# Patient Record
Sex: MALE | URBAN - NONMETROPOLITAN AREA
[De-identification: names, ages, dates, MRNs, and addresses within clinical notes are randomized per-mention and may not be internally consistent; named-entity substitution may affect disease eponyms.]

---

## 2017-05-19 ENCOUNTER — APPOINTMENT (RX ONLY)
Dept: URBAN - NONMETROPOLITAN AREA CLINIC 1 | Facility: CLINIC | Age: 72
Setting detail: DERMATOLOGY
End: 2017-05-19

## 2017-05-19 DIAGNOSIS — D485 NEOPLASM OF UNCERTAIN BEHAVIOR OF SKIN: ICD-10-CM

## 2017-05-19 DIAGNOSIS — L29.8 OTHER PRURITUS: ICD-10-CM

## 2017-05-19 DIAGNOSIS — L29.89 OTHER PRURITUS: ICD-10-CM

## 2017-05-19 PROBLEM — D48.5 NEOPLASM OF UNCERTAIN BEHAVIOR OF SKIN: Status: ACTIVE | Noted: 2017-05-19

## 2017-05-19 PROBLEM — J45.909 UNSPECIFIED ASTHMA, UNCOMPLICATED: Status: ACTIVE | Noted: 2017-05-19

## 2017-05-19 PROCEDURE — ? COUNSELING

## 2017-05-19 PROCEDURE — 11100: CPT

## 2017-05-19 PROCEDURE — 99202 OFFICE O/P NEW SF 15 MIN: CPT | Mod: 25

## 2017-05-19 PROCEDURE — ? BIOPSY BY SHAVE METHOD

## 2017-05-19 PROCEDURE — ? TREATMENT REGIMEN

## 2017-05-19 PROCEDURE — ? BIOPSY BY PUNCH METHOD

## 2017-05-19 PROCEDURE — 11101: CPT

## 2017-05-19 ASSESSMENT — LOCATION SIMPLE DESCRIPTION DERM
LOCATION SIMPLE: LEFT CHEEK
LOCATION SIMPLE: UPPER BACK
LOCATION SIMPLE: NOSE

## 2017-05-19 ASSESSMENT — LOCATION ZONE DERM
LOCATION ZONE: TRUNK
LOCATION ZONE: NOSE
LOCATION ZONE: FACE

## 2017-05-19 ASSESSMENT — LOCATION DETAILED DESCRIPTION DERM
LOCATION DETAILED: SUPERIOR THORACIC SPINE
LOCATION DETAILED: LEFT CENTRAL MALAR CHEEK
LOCATION DETAILED: NASAL SUPRATIP

## 2017-05-19 ASSESSMENT — ITCH INTENSITY: HOW SEVERE IS YOUR ITCHING?: 6

## 2017-05-19 NOTE — PROCEDURE: BIOPSY BY PUNCH METHOD
Post-Care Instructions: I reviewed with the patient in detail post-care instructions. Patient is to keep the biopsy site dry overnight, and then apply Polysporin twice daily until healed. Patient may apply hydrogen peroxide soaks to remove any crusting.
Consent: Written consent was obtained and risks were reviewed including but not limited to scarring, infection, bleeding, scabbing, incomplete removal, nerve damage and allergy to anesthesia.
Billing Type: Third-Party Bill
Home Suture Removal Text: Patient was provided a home suture removal kit and will remove their sutures at home.  If they have any questions or difficulties they will call the office.
Patient Will Remove Sutures At Home?: No
Size Of Lesion In Cm (Optional): 1.2
Anesthesia Type: 1% lidocaine with epinephrine and a 1:10 solution of 8.4% sodium bicarbonate
X Depth Of Punch In Cm (Optional): 0
Detail Level: Detailed
Dressing: bandage
Epidermal Sutures: none, closed by secondary intention
X Size Of Lesion In Cm (Optional): 1
Render Post-Care Instructions In Note?: yes
Notification Instructions: Patient will be notified of biopsy results. However, patient instructed to call the office if not contacted within 2 weeks.
Punch Size In Mm: 3
Lab Facility: [id number]
Biopsy Type: H and E
Body Location Override (Optional - Billing Will Still Be Based On Selected Body Map Location If Applicable): Rt nasal ala
Lab: [id number]
Wound Care: Polysporin ointment
Hemostasis: Electrodesiccation

## 2017-05-19 NOTE — PROCEDURE: BIOPSY BY SHAVE METHOD
Bill For Surgical Tray: no
Lab Facility: [id number]
Biopsy Type: H and E
Hemostasis: Electrodesiccation
Silver Nitrate Text: The wound bed was treated with silver nitrate after the biopsy was performed.
Render Post-Care Instructions In Note?: yes
Notification Instructions: Patient will be notified of biopsy results. However, patient instructed to call the office if not contacted within 2 weeks.
Curettage Text: The wound bed was treated with curettage after the biopsy was done.
Additional Anesthesia Volume In Cc (Will Not Render If 0): 0
Electrodesiccation Text: The wound bed was treated with electrodesiccation after the biopsy was performed.
Type Of Destruction Used: Curettage
Size Of Lesion In Cm: 1.4
Anesthesia Volume In Cc: 1
Consent: Written consent was obtained and risks were reviewed including but not limited to scarring, infection, bleeding, scabbing, incomplete removal, nerve damage and allergy to anesthesia.
Dressing: bandage
Biopsy Method: Kelton bonilla
Anesthesia Type: 1% lidocaine with epinephrine and a 1:10 solution of 8.4% sodium bicarbonate
Detail Level: Detailed
Cryotherapy Text: The wound bed was treated with cryotherapy after the biopsy was performed.
Billing Type: Third-Party Bill
Post-Care Instructions: I reviewed with the patient in detail post-care instructions. Patient is to keep the biopsy site dry overnight, and then apply Polysporin twice daily until healed. Patient may apply hydrogen peroxide soaks to remove any crusting.
Body Location Override (Optional - Billing Will Still Be Based On Selected Body Map Location If Applicable): L cheek
Electrodesiccation And Curettage Text: The wound bed was treated with electrodesiccation and curettage after the biopsy was performed.
Lab: [id number]
Wound Care: Polysporin ointment

## 2017-06-13 ENCOUNTER — RX ONLY (OUTPATIENT)
Age: 72
Setting detail: RX ONLY
End: 2017-06-13

## 2017-06-13 PROBLEM — C44.311 BASAL CELL CARCINOMA OF SKIN OF NOSE: Status: ACTIVE | Noted: 2017-06-13

## 2017-09-21 ENCOUNTER — APPOINTMENT (RX ONLY)
Dept: URBAN - NONMETROPOLITAN AREA CLINIC 1 | Facility: CLINIC | Age: 72
Setting detail: DERMATOLOGY
End: 2017-09-21

## 2017-09-21 DIAGNOSIS — L57.0 ACTINIC KERATOSIS: ICD-10-CM

## 2017-09-21 PROBLEM — Z85.828 PERSONAL HISTORY OF OTHER MALIGNANT NEOPLASM OF SKIN: Status: ACTIVE | Noted: 2017-09-21

## 2017-09-21 PROBLEM — C44.329 SQUAMOUS CELL CARCINOMA OF SKIN OF OTHER PARTS OF FACE: Status: ACTIVE | Noted: 2017-09-21

## 2017-09-21 PROCEDURE — ? COUNSELING

## 2017-09-21 PROCEDURE — ? TREATMENT REGIMEN

## 2017-09-21 PROCEDURE — ? OBSERVATION

## 2017-09-21 PROCEDURE — 99213 OFFICE O/P EST LOW 20 MIN: CPT

## 2017-09-21 ASSESSMENT — LOCATION DETAILED DESCRIPTION DERM: LOCATION DETAILED: RIGHT MEDIAL FOREHEAD

## 2017-09-21 ASSESSMENT — LOCATION ZONE DERM: LOCATION ZONE: FACE

## 2017-09-21 ASSESSMENT — LOCATION SIMPLE DESCRIPTION DERM: LOCATION SIMPLE: RIGHT FOREHEAD

## 2017-09-21 NOTE — PROCEDURE: TREATMENT REGIMEN
Detail Level: Simple
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
Notification: Please note that there are new Treatment Regimen plans which have an enhanced patient education handout experience.  The plans are called Treatment Regimen (Acne), Treatment Regimen (Atopic Dermatitis), Treatment Regimen (Rosacea), Treatment Regimen (Sun Protection), Treatment Regimen (Cosmetic Products), and Treatment Regimen (Xerosis).
Plan: Patient referred to [facility]  for Mohs surgery

## 2017-10-05 ENCOUNTER — APPOINTMENT (RX ONLY)
Dept: URBAN - NONMETROPOLITAN AREA CLINIC 15 | Facility: CLINIC | Age: 72
Setting detail: DERMATOLOGY
End: 2017-10-05

## 2017-10-05 PROBLEM — C44.92 SQUAMOUS CELL CARCINOMA OF SKIN, UNSPECIFIED: Status: ACTIVE | Noted: 2017-10-05

## 2017-10-05 PROBLEM — J45.909 UNSPECIFIED ASTHMA, UNCOMPLICATED: Status: ACTIVE | Noted: 2017-10-05

## 2017-10-05 PROBLEM — Z85.828 PERSONAL HISTORY OF OTHER MALIGNANT NEOPLASM OF SKIN: Status: ACTIVE | Noted: 2017-10-05

## 2017-10-05 PROCEDURE — ? MOHS SURGERY PHONE CONSULTATION

## 2017-10-05 NOTE — PROCEDURE: MOHS SURGERY PHONE CONSULTATION
Does The Patient Take Blood Thinners?: Yes
Office Location Of Mohs Surgery: [address]
Date Of Mohs Surgery: 10/30/2017
If Yes- What Blood Thinners Do They Take?: Ibuprophen
Referring Provider: Aj
Patient Preferred Phone Number: [phone]
Does The Patient Have A Living Will (Optional)?: No
Time Of Mohs Surgery: 11:15
Patient's Insurance: /[employer]
Detail Level: Simple
Referring Provider Office Number: [phone]

## 2017-10-30 ENCOUNTER — APPOINTMENT (RX ONLY)
Dept: URBAN - METROPOLITAN AREA CLINIC 36 | Facility: CLINIC | Age: 72
Setting detail: DERMATOLOGY
End: 2017-10-30

## 2017-10-30 VITALS — SYSTOLIC BLOOD PRESSURE: 133 MMHG | HEART RATE: 80 BPM | DIASTOLIC BLOOD PRESSURE: 81 MMHG

## 2017-10-30 DIAGNOSIS — L57.8 OTHER SKIN CHANGES DUE TO CHRONIC EXPOSURE TO NONIONIZING RADIATION: ICD-10-CM

## 2017-10-30 PROBLEM — C44.329 SQUAMOUS CELL CARCINOMA OF SKIN OF OTHER PARTS OF FACE: Status: ACTIVE | Noted: 2017-10-30

## 2017-10-30 PROCEDURE — ? COUNSELING

## 2017-10-30 PROCEDURE — 17311 MOHS 1 STAGE H/N/HF/G: CPT

## 2017-10-30 PROCEDURE — 13132 CMPLX RPR F/C/C/M/N/AX/G/H/F: CPT

## 2017-10-30 PROCEDURE — 17312 MOHS ADDL STAGE: CPT

## 2017-10-30 PROCEDURE — 99212 OFFICE O/P EST SF 10 MIN: CPT | Mod: 25

## 2017-10-30 PROCEDURE — ? MOHS SURGERY

## 2017-10-30 NOTE — PROCEDURE: MOHS SURGERY
Advancement Flap (Double) Text: The defect edges were debeveled with a #15 scalpel blade.  Given the location of the defect and the proximity to free margins a double advancement flap was deemed most appropriate.  Using a sterile surgical marker, the appropriate advancement flaps were drawn incorporating the defect and placing the expected incisions within the relaxed skin tension lines where possible.    The area thus outlined was incised deep to adipose tissue with a #15 scalpel blade.  The skin margins were undermined to an appropriate distance in all directions utilizing iris scissors.
Stage 4: Additional Anesthesia Volume In Cc: 0
Tarsorrhaphy Performed?: No
Graft Donor Site Epidermal Sutures (Optional): 5-0 Surgipro
Stage 5: Additional Anesthesia Type: 1% lidocaine with epinephrine
Show Quadrant Variables In The Stage Tabs: Yes
Star Wedge Flap Text: The defect edges were debeveled with a #15 scalpel blade.  Given the location of the defect, shape of the defect and the proximity to free margins a star wedge flap was deemed most appropriate.  Using a sterile surgical marker, an appropriate rotation flap was drawn incorporating the defect and placing the expected incisions within the relaxed skin tension lines where possible. The area thus outlined was incised deep to adipose tissue with a #15 scalpel blade.  The skin margins were undermined to an appropriate distance in all directions utilizing iris scissors.
No Repair - Repaired With Adjacent Surgical Defect Text (Leave Blank If You Do Not Want): After obtaining clear surgical margins the defect was repaired concurrently with another surgical defect which was in close approximation.
O-L Flap Text: The defect edges were debeveled with a #15 scalpel blade.  Given the location of the defect, shape of the defect and the proximity to free margins an O-L flap was deemed most appropriate.  Using a sterile surgical marker, an appropriate advancement flap was drawn incorporating the defect and placing the expected incisions within the relaxed skin tension lines where possible.    The area thus outlined was incised deep to adipose tissue with a #15 scalpel blade.  The skin margins were undermined to an appropriate distance in all directions utilizing iris scissors.
Anesthesia Volume In Cc: 6
Surgeon: Berna Salazar MD
Localized Dermabrasion With Wire Brush Text: The patient was draped in routine manner.  Localized dermabrasion using 3 x 17 mm wire brush was performed in routine manner to papillary dermis. This spot dermabrasion is being performed to complete skin cancer reconstruction. It also will eliminate the other sun damaged precancerous cells that are known to be part of the regional effect of a lifetime's worth of sun exposure. This localized dermabrasion is therapeutic and should not be considered cosmetic in any regard.
Consent (Nose)/Introductory Paragraph: The rationale for Mohs was explained to the patient and consent was obtained. The risks, benefits and alternatives to therapy were discussed in detail. Specifically, the risks of nasal deformity, changes in the flow of air through the nose, infection, scarring, bleeding, prolonged wound healing, incomplete removal, allergy to anesthesia, nerve injury and recurrence were addressed. Prior to the procedure, the treatment site was clearly identified and confirmed by the patient. All components of Universal Protocol/PAUSE Rule completed.
Bilateral Helical Rim Advancement Flap Text: The defect edges were debeveled with a #15 blade scalpel.  Given the location of the defect and the proximity to free margins (helical rim) a bilateral helical rim advancement flap was deemed most appropriate.  Using a sterile surgical marker, the appropriate advancement flaps were drawn incorporating the defect and placing the expected incisions between the helical rim and antihelix where possible.  The area thus outlined was incised through and through with a #15 scalpel blade.  With a skin hook and iris scissors, the flaps were gently and sharply undermined and freed up.
Closure 4 Information: This tab is for additional flaps and grafts above and beyond our usual structured repairs.  Please note if you enter information here it will not currently bill and you will need to add the billing information manually.
Mauc Instructions: By selecting yes to the question below the MAUC number will be added into the note.  This will be calculated automatically based on the diagnosis chosen, the size entered, the body zone selected (H,M,L) and the specific indications you chose. You will also have the option to override the Mohs AUC if you disagree with the automatically calculated number and this option is found in the Case Summary tab.
Repair Type: Complex Repair
Epidermal Autograft Text: The defect edges were debeveled with a #15 scalpel blade.  Given the location of the defect, shape of the defect and the proximity to free margins an epidermal autograft was deemed most appropriate.  Using a sterile surgical marker, the primary defect shape was transferred to the donor site. The epidermal graft was then harvested.  The skin graft was then placed in the primary defect and oriented appropriately.
H Plasty Text: Given the location of the defect, shape of the defect and the proximity to free margins a H-plasty was deemed most appropriate for repair.  Using a sterile surgical marker, the appropriate advancement arms of the H-plasty were drawn incorporating the defect and placing the expected incisions within the relaxed skin tension lines where possible. The area thus outlined was incised deep to adipose tissue with a #15 scalpel blade. The skin margins were undermined to an appropriate distance in all directions utilizing iris scissors.  The opposing advancement arms were then advanced into place in opposite direction and anchored with interrupted buried subcutaneous sutures.
Mohs Histo Method Verbiage: Each section was then chromacoded and processed in the Mohs lab using the Mohs protocol and submitted for frozen section.
O-T Plasty Text: The defect edges were debeveled with a #15 scalpel blade.  Given the location of the defect, shape of the defect and the proximity to free margins an O-T plasty was deemed most appropriate.  Using a sterile surgical marker, an appropriate O-T plasty was drawn incorporating the defect and placing the expected incisions within the relaxed skin tension lines where possible.    The area thus outlined was incised deep to adipose tissue with a #15 scalpel blade.  The skin margins were undermined to an appropriate distance in all directions utilizing iris scissors.
Z Plasty Text: The lesion was extirpated to the level of the fat with a #15 scalpel blade.  Given the location of the defect, shape of the defect and the proximity to free margins a Z-plasty was deemed most appropriate for repair.  Using a sterile surgical marker, the appropriate transposition arms of the Z-plasty were drawn incorporating the defect and placing the expected incisions within the relaxed skin tension lines where possible.    The area thus outlined was incised deep to adipose tissue with a #15 scalpel blade.  The skin margins were undermined to an appropriate distance in all directions utilizing iris scissors.  The opposing transposition arms were then transposed into place in opposite direction and anchored with interrupted buried subcutaneous sutures.
Area L Indication Text: Tumors in this location are included in Area L (trunk and extremities).  Mohs surgery is indicated for larger tumors, or tumors with aggressive histologic features, in these anatomic locations.
Number Of Stages: 2
Island Pedicle Flap With Canthal Suspension Text: The defect edges were debeveled with a #15 scalpel blade.  Given the location of the defect, shape of the defect and the proximity to free margins an island pedicle advancement flap was deemed most appropriate.  Using a sterile surgical marker, an appropriate advancement flap was drawn incorporating the defect, outlining the appropriate donor tissue and placing the expected incisions within the relaxed skin tension lines where possible. The area thus outlined was incised deep to adipose tissue with a #15 scalpel blade.  The skin margins were undermined to an appropriate distance in all directions around the primary defect and laterally outward around the island pedicle utilizing iris scissors.  There was minimal undermining beneath the pedicle flap. A suspension suture was placed in the canthal tendon to prevent tension and prevent ectropion.
Intermediate Repair Preamble Text (Leave Blank If You Do Not Want): Undermining was performed with blunt dissection.
Same Histology In Subsequent Stages Text: The pattern and morphology of the tumor is as described in the first stage.
Consent (Temporal Branch)/Introductory Paragraph: The rationale for Mohs was explained to the patient and consent was obtained. The risks, benefits and alternatives to therapy were discussed in detail. Specifically, the risks of damage to the temporal branch of the facial nerve, infection, scarring, bleeding, prolonged wound healing, incomplete removal, allergy to anesthesia, and recurrence were addressed. Prior to the procedure, the treatment site was clearly identified and confirmed by the patient. All components of Universal Protocol/PAUSE Rule completed.
Complex Repair And Flap Additional Text (Will Appearing After The Standard Complex Repair Text): The complex repair was not sufficient to completely close the primary defect. The remaining additional defect was repaired with the flap mentioned below.
Split-Thickness Skin Graft Text: The defect edges were debeveled with a #15 scalpel blade.  Given the location of the defect, shape of the defect and the proximity to free margins a split thickness skin graft was deemed most appropriate.  Using a sterile surgical marker, the primary defect shape was transferred to the donor site. The split thickness graft was then harvested.  The skin graft was then placed in the primary defect and oriented appropriately.
S Plasty Text: Given the location and shape of the defect, and the orientation of relaxed skin tension lines, an S-plasty was deemed most appropriate for repair.  Using a sterile surgical marker, the appropriate outline of the S-plasty was drawn, incorporating the defect and placing the expected incisions within the relaxed skin tension lines where possible.  The area thus outlined was incised deep to adipose tissue with a #15 scalpel blade.  The skin margins were undermined to an appropriate distance in all directions utilizing iris scissors. The skin flaps were advanced over the defect.  The opposing margins were then approximated with interrupted buried subcutaneous sutures.
Referred To Mid-Level For Closure Text (Leave Blank If You Do Not Want): After obtaining clear surgical margins the patient was sent to a mid-level provider for surgical repair.  The patient understands they will receive post-surgical care and follow-up from the mid-level provider.
Hatchet Flap Text: The defect edges were debeveled with a #15 scalpel blade.  Given the location of the defect, shape of the defect and the proximity to free margins a hatchet flap was deemed most appropriate.  Using a sterile surgical marker, an appropriate hatchet flap was drawn incorporating the defect and placing the expected incisions within the relaxed skin tension lines where possible.    The area thus outlined was incised deep to adipose tissue with a #15 scalpel blade.  The skin margins were undermined to an appropriate distance in all directions utilizing iris scissors.
Cheiloplasty (Less Than 50%) Text: A decision was made to reconstruct the defect with a  cheiloplasty.  The defect was undermined extensively.  Additional obicularis oris muscle was excised with a 15 blade scalpel.  The defect was converted into a full thickness wedge, of less than 50% of the vertical height of the lip, to facilite a better cosmetic result.  Small vessels were then tied off with 5-0 monocyrl. The obicularis oris, superficial fascia, adipose and dermis were then reapproximated.  After the deeper layers were approximated the epidermis was reapproximated with particular care given to realign the vermilion border.
Bcc Infiltrative Histology Text: There were numerous aggregates of basaloid cells demonstrating an infiltrative pattern.
Partial Purse String (Simple) Text: Given the location of the defect and the characteristics of the surrounding skin a simple purse string closure was deemed most appropriate.  Undermining was performed circumfirentially around the surgical defect.  A purse string suture was then placed and tightened. Wound tension only allowed a partial closure of the circular defect.
Double Island Pedicle Flap Text: The defect edges were debeveled with a #15 scalpel blade.  Given the location of the defect, shape of the defect and the proximity to free margins a double island pedicle advancement flap was deemed most appropriate.  Using a sterile surgical marker, an appropriate advancement flap was drawn incorporating the defect, outlining the appropriate donor tissue and placing the expected incisions within the relaxed skin tension lines where possible.    The area thus outlined was incised deep to adipose tissue with a #15 scalpel blade.  The skin margins were undermined to an appropriate distance in all directions around the primary defect and laterally outward around the island pedicle utilizing iris scissors.  There was minimal undermining beneath the pedicle flap.
Muscle Hinge Flap Text: The defect edges were debeveled with a #15 scalpel blade.  Given the size, depth and location of the defect and the proximity to free margins a muscle hinge flap was deemed most appropriate.  Using a sterile surgical marker, an appropriate hinge flap was drawn incorporating the defect. The area thus outlined was incised with a #15 scalpel blade.  The skin margins were undermined to an appropriate distance in all directions utilizing iris scissors.
Melolabial Transposition Flap Text: The defect edges were debeveled with a #15 scalpel blade.  Given the location of the defect and the proximity to free margins a melolabial flap was deemed most appropriate.  Using a sterile surgical marker, an appropriate melolabial transposition flap was drawn incorporating the defect.    The area thus outlined was incised deep to adipose tissue with a #15 scalpel blade.  The skin margins were undermined to an appropriate distance in all directions utilizing iris scissors.
Purse String (Simple) Text: Given the location of the defect and the characteristics of the surrounding skin a purse string closure was deemed most appropriate.  Undermining was performed circumfirentially around the surgical defect.  A purse string suture was then placed and tightened.
Rotation Flap Text: The defect edges were debeveled with a #15 scalpel blade.  Given the location of the defect, shape of the defect and the proximity to free margins a rotation flap was deemed most appropriate.  Using a sterile surgical marker, an appropriate rotation flap was drawn incorporating the defect and placing the expected incisions within the relaxed skin tension lines where possible.    The area thus outlined was incised deep to adipose tissue with a #15 scalpel blade.  The skin margins were undermined to an appropriate distance in all directions utilizing iris scissors.
Trilobed Flap Text: The defect edges were debeveled with a #15 scalpel blade.  Given the location of the defect and the proximity to free margins a trilobed flap was deemed most appropriate.  Using a sterile surgical marker, an appropriate trilobed flap drawn around the defect.    The area thus outlined was incised deep to adipose tissue with a #15 scalpel blade.  The skin margins were undermined to an appropriate distance in all directions utilizing iris scissors.
Burow's Advancement Flap Text: The defect edges were debeveled with a #15 scalpel blade.  Given the location of the defect and the proximity to free margins a Burow's advancement flap was deemed most appropriate.  Using a sterile surgical marker, the appropriate advancement flap was drawn incorporating the defect and placing the expected incisions within the relaxed skin tension lines where possible.    The area thus outlined was incised deep to adipose tissue with a #15 scalpel blade.  The skin margins were undermined to an appropriate distance in all directions utilizing iris scissors.
Alternatives Discussed Intro (Do Not Add Period): I discussed alternative treatments to Mohs surgery and specifically discussed the risks and benefits of
Graft Donor Site Dermal Sutures (Optional): 5-0 Polysorb
Modified Advancement Flap Text: The defect edges were debeveled with a #15 scalpel blade.  Given the location of the defect, shape of the defect and the proximity to free margins a modified advancement flap was deemed most appropriate.  Using a sterile surgical marker, an appropriate advancement flap was drawn incorporating the defect and placing the expected incisions within the relaxed skin tension lines where possible.    The area thus outlined was incised deep to adipose tissue with a #15 scalpel blade.  The skin margins were undermined to an appropriate distance in all directions utilizing iris scissors.
Bilobed Transposition Flap Text: The defect edges were debeveled with a #15 scalpel blade.  Given the location of the defect and the proximity to free margins a bilobed transposition flap was deemed most appropriate.  Using a sterile surgical marker, an appropriate bilobe flap drawn around the defect.    The area thus outlined was incised deep to adipose tissue with a #15 scalpel blade.  The skin margins were undermined to an appropriate distance in all directions utilizing iris scissors.
Consent (Scalp)/Introductory Paragraph: The rationale for Mohs was explained to the patient and consent was obtained. The risks, benefits and alternatives to therapy were discussed in detail. Specifically, the risks of changes in hair growth pattern secondary to repair, infection, scarring, bleeding, prolonged wound healing, incomplete removal, allergy to anesthesia, nerve injury and recurrence were addressed. Prior to the procedure, the treatment site was clearly identified and confirmed by the patient. All components of Universal Protocol/PAUSE Rule completed.
Advancement Flap (Single) Text: The defect edges were debeveled with a #15 scalpel blade.  Given the location of the defect and the proximity to free margins a single advancement flap was deemed most appropriate.  Using a sterile surgical marker, an appropriate advancement flap was drawn incorporating the defect and placing the expected incisions within the relaxed skin tension lines where possible.    The area thus outlined was incised deep to adipose tissue with a #15 scalpel blade.  The skin margins were undermined to an appropriate distance in all directions utilizing iris scissors.
Manual Repair Warning Statement: We plan on removing the manually selected variable below in favor of our much easier automatic structured text blocks found in the previous tab. We decided to do this to help make the flow better and give you the full power of structured data. Manual selection is never going to be ideal in our platform and I would encourage you to avoid using manual selection from this point on, especially since I will be sunsetting this feature. It is important that you do one of two things with the customized text below. First, you can save all of the text in a word file so you can have it for future reference. Second, transfer the text to the appropriate area in the Library tab. Lastly, if there is a flap or graft type which we do not have you need to let us know right away so I can add it in before the variable is hidden. No need to panic, we plan to give you roughly 6 months to make the change.
Consent (Spinal Accessory)/Introductory Paragraph: The rationale for Mohs was explained to the patient and consent was obtained. The risks, benefits and alternatives to therapy were discussed in detail. Specifically, the risks of damage to the spinal accessory nerve, infection, scarring, bleeding, prolonged wound healing, incomplete removal, allergy to anesthesia, and recurrence were addressed. Prior to the procedure, the treatment site was clearly identified and confirmed by the patient. All components of Universal Protocol/PAUSE Rule completed.
Referred To Otolaryngology For Closure Text (Leave Blank If You Do Not Want): After obtaining clear surgical margins the patient was sent to otolaryngology for surgical repair.  The patient understands they will receive post-surgical care and follow-up from the referring physician's office.
Consent (Ear)/Introductory Paragraph: The rationale for Mohs was explained to the patient and consent was obtained. The risks, benefits and alternatives to therapy were discussed in detail. Specifically, the risks of ear deformity, infection, scarring, bleeding, prolonged wound healing, incomplete removal, allergy to anesthesia, nerve injury and recurrence were addressed. Prior to the procedure, the treatment site was clearly identified and confirmed by the patient. All components of Universal Protocol/PAUSE Rule completed.
Partial Purse String (Intermediate) Text: Given the location of the defect and the characteristics of the surrounding skin an intermediate purse string closure was deemed most appropriate.  Undermining was performed circumfirentially around the surgical defect.  A purse string suture was then placed and tightened. Wound tension only allowed a partial closure of the circular defect.
Full Thickness Lip Wedge Repair (Flap) Text: Given the location of the defect and the proximity to free margins a full thickness wedge repair was deemed most appropriate.  Using a sterile surgical marker, the appropriate repair was drawn incorporating the defect and placing the expected incisions perpendicular to the vermilion border.  The vermilion border was also meticulously outlined to ensure appropriate reapproximation during the repair.  The area thus outlined was incised through and through with a #15 scalpel blade.  The muscularis and dermis were reaproximated with deep sutures following hemostasis. Care was taken to realign the vermilion border before proceeding with the superficial closure.  Once the vermilion was realigned the superfical and mucosal closure was finished.
Skin Substitute Text: The defect edges were debeveled with a #15 scalpel blade.  Given the location of the defect, shape of the defect and the proximity to free margins a skin substitute graft was deemed most appropriate.  The graft material was trimmed to fit the size of the defect. The graft was then placed in the primary defect and oriented appropriately.
Bi-Rhombic Flap Text: The defect edges were debeveled with a #15 scalpel blade.  Given the location of the defect and the proximity to free margins a bi-rhombic flap was deemed most appropriate.  Using a sterile surgical marker, an appropriate rhombic flap was drawn incorporating the defect. The area thus outlined was incised deep to adipose tissue with a #15 scalpel blade.  The skin margins were undermined to an appropriate distance in all directions utilizing iris scissors.
Keystone Flap Text: The defect edges were debeveled with a #15 scalpel blade.  Given the location of the defect, shape of the defect a keystone flap was deemed most appropriate.  Using a sterile surgical marker, an appropriate keystone flap was drawn incorporating the defect, outlining the appropriate donor tissue and placing the expected incisions within the relaxed skin tension lines where possible. The area thus outlined was incised deep to adipose tissue with a #15 scalpel blade.  The skin margins were undermined to an appropriate distance in all directions around the primary defect and laterally outward around the flap utilizing iris scissors.
Consent Type: Consent 1 (Standard)
Ear Star Wedge Flap Text: The defect edges were debeveled with a #15 blade scalpel.  Given the location of the defect and the proximity to free margins (helical rim) an ear star wedge flap was deemed most appropriate.  Using a sterile surgical marker, the appropriate flap was drawn incorporating the defect and placing the expected incisions between the helical rim and antihelix where possible.  The area thus outlined was incised through and through with a #15 scalpel blade.
Cheiloplasty (Complex) Text: A decision was made to reconstruct the defect with a  cheiloplasty.  The defect was undermined extensively.  Additional obicularis oris muscle was excised with a 15 blade scalpel.  The defect was converted into a full thickness wedge to facilite a better cosmetic result.  Small vessels were then tied off with 5-0 monocyrl. The obicularis oris, superficial fascia, adipose and dermis were then reapproximated.  After the deeper layers were approximated the epidermis was reapproximated with particular care given to realign the vermilion border.
A-T Advancement Flap Text: The defect edges were debeveled with a #15 scalpel blade.  Given the location of the defect, shape of the defect and the proximity to free margins an A-T advancement flap was deemed most appropriate.  Using a sterile surgical marker, an appropriate advancement flap was drawn incorporating the defect and placing the expected incisions within the relaxed skin tension lines where possible.    The area thus outlined was incised deep to adipose tissue with a #15 scalpel blade.  The skin margins were undermined to an appropriate distance in all directions utilizing iris scissors.
Postop Diagnosis: same
Home Suture Removal Text: Patient was provided instructions on removing sutures and will remove their sutures at home.  If they have any questions or difficulties they will call the office.
Lazy S Complex Repair Preamble Text (Leave Blank If You Do Not Want): Extensive wide undermining was performed.
No Residual Tumor Seen Histology Text: There were no malignant cells seen in the sections examined.
Surgeon/Pathologist Verbiage (Will Incorporate Name Of Surgeon From Intro If Not Blank): operated in two distinct and integrated capacities as the surgeon and pathologist.
Eye Protection Verbiage: Before proceeding with the stage, a plastic scleral shield was inserted. The globe was anesthetized with a few drops of 1% lidocaine with 1:100,000 epinephrine. Then, an appropriate sized scleral shield was chosen and coated with lacrilube ointment. The shield was gently inserted and left in place for the duration of each stage. After the stage was completed, the shield was gently removed.
V-Y Flap Text: The defect edges were debeveled with a #15 scalpel blade.  Given the location of the defect, shape of the defect and the proximity to free margins a V-Y flap was deemed most appropriate.  Using a sterile surgical marker, an appropriate advancement flap was drawn incorporating the defect and placing the expected incisions within the relaxed skin tension lines where possible.    The area thus outlined was incised deep to adipose tissue with a #15 scalpel blade.  The skin margins were undermined to an appropriate distance in all directions utilizing iris scissors.
Area H Indication Text: Tumors in this location are included in Area H (eyelids, eyebrows, nose, lips, chin, ear, pre-auricular, post-auricular, temple, genitalia, hands, feet, ankles and areola).  Tissue conservation is critical in these anatomic locations.
Cheek-To-Nose Interpolation Flap Text: A decision was made to reconstruct the defect utilizing an interpolation axial flap and a staged reconstruction.  A telfa template was made of the defect.  This telfa template was then used to outline the Cheek-To-Nose Interpolation flap.  The donor area for the pedicle flap was then injected with anesthesia.  The flap was excised through the skin and subcutaneous tissue down to the layer of the underlying musculature.  The interpolation flap was carefully excised within this deep plane to maintain its blood supply.  The edges of the donor site were undermined.   The donor site was closed in a primary fashion.  The pedicle was then rotated into position and sutured.  Once the tube was sutured into place, adequate blood supply was confirmed with blanching and refill.  The pedicle was then wrapped with xeroform gauze and dressed appropriately with a telfa and gauze bandage to ensure continued blood supply and protect the attached pedicle.
Stage 1: Number Of Blocks?: 1
Wound Check: 6 weeks
Simple / Intermediate / Complex Repair - Final Wound Length In Cm: 5.1
Unna Boot Text: An Unna boot was placed to help immobilize the limb and facilitate more rapid healing.
Hemostasis: Electrocautery
Posterior Auricular Interpolation Flap Text: A decision was made to reconstruct the defect utilizing an interpolation axial flap and a staged reconstruction.  A telfa template was made of the defect.  This telfa template was then used to outline the posterior auricular interpolation flap.  The donor area for the pedicle flap was then injected with anesthesia.  The flap was excised through the skin and subcutaneous tissue down to the layer of the underlying musculature.  The pedicle flap was carefully excised within this deep plane to maintain its blood supply.  The edges of the donor site were undermined.   The donor site was closed in a primary fashion.  The pedicle was then rotated into position and sutured.  Once the tube was sutured into place, adequate blood supply was confirmed with blanching and refill.  The pedicle was then wrapped with xeroform gauze and dressed appropriately with a telfa and gauze bandage to ensure continued blood supply and protect the attached pedicle.
Estimated Blood Loss (Cc): minimal
Mohs Rapid Report Verbiage: The area of clinically evident tumor was marked with skin marking ink and appropriately hatched.  The initial incision was made following the Mohs approach through the skin.  The specimen was taken to the lab, divided into the necessary number of pieces, chromacoded and processed according to the Mohs protocol.  This was repeated in successive stages until a tumor free defect was achieved.
Paramedian Forehead Flap Text: A decision was made to reconstruct the defect utilizing an interpolation axial flap and a staged reconstruction.  A telfa template was made of the defect.  This telfa template was then used to outline the paramedian forehead pedicle flap.  The donor area for the pedicle flap was then injected with anesthesia.  The flap was excised through the skin and subcutaneous tissue down to the layer of the underlying musculature.  The pedicle flap was carefully excised within this deep plane to maintain its blood supply.  The edges of the donor site were undermined.   The donor site was closed in a primary fashion.  The pedicle was then rotated into position and sutured.  Once the tube was sutured into place, adequate blood supply was confirmed with blanching and refill.  The pedicle was then wrapped with xeroform gauze and dressed appropriately with a telfa and gauze bandage to ensure continued blood supply and protect the attached pedicle.
Suture Removal: 7 days
Cheek Interpolation Flap Text: A decision was made to reconstruct the defect utilizing an interpolation axial flap and a staged reconstruction.  A telfa template was made of the defect.  This telfa template was then used to outline the Cheek Interpolation flap.  The donor area for the pedicle flap was then injected with anesthesia.  The flap was excised through the skin and subcutaneous tissue down to the layer of the underlying musculature.  The interpolation flap was carefully excised within this deep plane to maintain its blood supply.  The edges of the donor site were undermined.   The donor site was closed in a primary fashion.  The pedicle was then rotated into position and sutured.  Once the tube was sutured into place, adequate blood supply was confirmed with blanching and refill.  The pedicle was then wrapped with xeroform gauze and dressed appropriately with a telfa and gauze bandage to ensure continued blood supply and protect the attached pedicle.
W Plasty Text: The lesion was extirpated to the level of the fat with a #15 scalpel blade.  Given the location of the defect, shape of the defect and the proximity to free margins a W-plasty was deemed most appropriate for repair.  Using a sterile surgical marker, the appropriate transposition arms of the W-plasty were drawn incorporating the defect and placing the expected incisions within the relaxed skin tension lines where possible.    The area thus outlined was incised deep to adipose tissue with a #15 scalpel blade.  The skin margins were undermined to an appropriate distance in all directions utilizing iris scissors.  The opposing transposition arms were then transposed into place in opposite direction and anchored with interrupted buried subcutaneous sutures.
O-T Advancement Flap Text: The defect edges were debeveled with a #15 scalpel blade.  Given the location of the defect, shape of the defect and the proximity to free margins an O-T advancement flap was deemed most appropriate.  Using a sterile surgical marker, an appropriate advancement flap was drawn incorporating the defect and placing the expected incisions within the relaxed skin tension lines where possible.    The area thus outlined was incised deep to adipose tissue with a #15 scalpel blade.  The skin margins were undermined to an appropriate distance in all directions utilizing iris scissors.
Ftsg Text: The defect edges were debeveled with a #15 scalpel blade.  Given the location of the defect, shape of the defect and the proximity to free margins a full thickness skin graft was deemed most appropriate.  Using a sterile surgical marker, the primary defect shape was transferred to the donor site. The area thus outlined was incised deep to adipose tissue with a #15 scalpel blade.  The harvested graft was then trimmed of adipose tissue until only dermis and epidermis was left.  The skin margins of the secondary defect were undermined to an appropriate distance in all directions utilizing iris scissors.  The secondary defect was closed with interrupted buried subcutaneous sutures.  The skin edges were then re-apposed with running  sutures.  The skin graft was then placed in the primary defect and oriented appropriately.
Alar Island Pedicle Flap Text: The defect edges were debeveled with a #15 scalpel blade.  Given the location of the defect, shape of the defect and the proximity to the alar rim an island pedicle advancement flap was deemed most appropriate.  Using a sterile surgical marker, an appropriate advancement flap was drawn incorporating the defect, outlining the appropriate donor tissue and placing the expected incisions within the nasal ala running parallel to the alar rim. The area thus outlined was incised with a #15 scalpel blade.  The skin margins were undermined minimally to an appropriate distance in all directions around the primary defect and laterally outward around the island pedicle utilizing iris scissors.  There was minimal undermining beneath the pedicle flap.
Mohs Case Number: [id number]
Detail Level: Detailed
Inflammation Suggestive Of Cancer Camouflage Histology Text: There was a dense lymphocytic infiltrate which prevented adequate histologic evaluation of adjacent structures.
Referred To Asc For Closure Text (Leave Blank If You Do Not Want): After obtaining clear surgical margins the patient was sent to an ASC for surgical repair.  The patient understands they will receive post-surgical care and follow-up from the ASC physician.
Area M Indication Text: Tumors in this location are included in Area M (cheek, forehead, scalp, neck, jawline and pretibial skin).  Mohs surgery is indicated for tumors in these anatomic locations.
Consent (Marginal Mandibular)/Introductory Paragraph: The rationale for Mohs was explained to the patient and consent was obtained. The risks, benefits and alternatives to therapy were discussed in detail. Specifically, the risks of damage to the marginal mandibular branch of the facial nerve, infection, scarring, bleeding, prolonged wound healing, incomplete removal, allergy to anesthesia, and recurrence were addressed. Prior to the procedure, the treatment site was clearly identified and confirmed by the patient. All components of Universal Protocol/PAUSE Rule completed.
Epidermal Closure Graft Donor Site (Optional): running
Mucosal Advancement Flap Text: Given the location of the defect, shape of the defect and the proximity to free margins a mucosal advancement flap was deemed most appropriate. Incisions were made with a 15 blade scalpel in the appropriate fashion along the cutaneous vermilion border and the mucosal lip. The remaining actinically damaged mucosal tissue was excised.  The mucosal advancement flap was then elevated to the gingival sulcus with care taken to preserve the neurovascular structures and advanced into the primary defect. Care was taken to ensure that precise realignment of the vermilion border was achieved.
Repair Performed By Another Provider Text (Leave Blank If You Do Not Want): After obtaining clear surgical margins the defect was repaired by another provider.
Melolabial Interpolation Flap Text: A decision was made to reconstruct the defect utilizing an interpolation axial flap and a staged reconstruction.  A telfa template was made of the defect.  This telfa template was then used to outline the melolabial interpolation flap.  The donor area for the pedicle flap was then injected with anesthesia.  The flap was excised through the skin and subcutaneous tissue down to the layer of the underlying musculature.  The pedicle flap was carefully excised within this deep plane to maintain its blood supply.  The edges of the donor site were undermined.   The donor site was closed in a primary fashion.  The pedicle was then rotated into position and sutured.  Once the tube was sutured into place, adequate blood supply was confirmed with blanching and refill.  The pedicle was then wrapped with xeroform gauze and dressed appropriately with a telfa and gauze bandage to ensure continued blood supply and protect the attached pedicle.
V-Y Plasty Text: The defect edges were debeveled with a #15 scalpel blade.  Given the location of the defect, shape of the defect and the proximity to free margins an V-Y advancement flap was deemed most appropriate.  Using a sterile surgical marker, an appropriate advancement flap was drawn incorporating the defect and placing the expected incisions within the relaxed skin tension lines where possible.    The area thus outlined was incised deep to adipose tissue with a #15 scalpel blade.  The skin margins were undermined to an appropriate distance in all directions utilizing iris scissors.
Consent 1/Introductory Paragraph: The rationale for Mohs was explained to the patient and consent was obtained. The risks, benefits and alternatives to therapy were discussed in detail. Specifically, the risks of infection, scarring, bleeding, prolonged wound healing, incomplete removal, allergy to anesthesia, nerve injury and recurrence were addressed. Prior to the procedure, the treatment site was clearly identified and confirmed by the patient. All components of Universal Protocol/PAUSE Rule completed.
Consent 3/Introductory Paragraph: I gave the patient a chance to ask questions they had about the procedure.  Following this I explained the Mohs procedure and consent was obtained. The risks, benefits and alternatives to therapy were discussed in detail. Specifically, the risks of infection, scarring, bleeding, prolonged wound healing, incomplete removal, allergy to anesthesia, nerve injury and recurrence were addressed. Prior to the procedure, the treatment site was clearly identified and confirmed by the patient. All components of Universal Protocol/PAUSE Rule completed.
Consent (Lip)/Introductory Paragraph: The rationale for Mohs was explained to the patient and consent was obtained. The risks, benefits and alternatives to therapy were discussed in detail. Specifically, the risks of lip deformity, changes in the oral aperture, infection, scarring, bleeding, prolonged wound healing, incomplete removal, allergy to anesthesia, nerve injury and recurrence were addressed. Prior to the procedure, the treatment site was clearly identified and confirmed by the patient. All components of Universal Protocol/PAUSE Rule completed.
Island Pedicle Flap Text: The defect edges were debeveled with a #15 scalpel blade.  Given the location of the defect, shape of the defect and the proximity to free margins an island pedicle advancement flap was deemed most appropriate.  Using a sterile surgical marker, an appropriate advancement flap was drawn incorporating the defect, outlining the appropriate donor tissue and placing the expected incisions within the relaxed skin tension lines where possible.    The area thus outlined was incised deep to adipose tissue with a #15 scalpel blade.  The skin margins were undermined to an appropriate distance in all directions around the primary defect and laterally outward around the island pedicle utilizing iris scissors.  There was minimal undermining beneath the pedicle flap.
Composite Graft Text: The defect edges were debeveled with a #15 scalpel blade.  Given the location of the defect, shape of the defect, the proximity to free margins and the fact the defect was full thickness a composite graft was deemed most appropriate.  The defect was outline and then transferred to the donor site.  A full thickness graft was then excised from the donor site. The graft was then placed in the primary defect, oriented appropriately and then sutured into place.  The secondary defect was then repaired using a primary closure.
Biopsy Photograph Reviewed: No (no photograph available)
Helical Rim Advancement Flap Text: The defect edges were debeveled with a #15 blade scalpel.  Given the location of the defect and the proximity to free margins (helical rim) a double helical rim advancement flap was deemed most appropriate.  Using a sterile surgical marker, the appropriate advancement flaps were drawn incorporating the defect and placing the expected incisions between the helical rim and antihelix where possible.  The area thus outlined was incised through and through with a #15 scalpel blade.  With a skin hook and iris scissors, the flaps were gently and sharply undermined and freed up.
Secondary Intention Text (Leave Blank If You Do Not Want): The defect will heal with secondary intention.
Tarsorrhaphy Text: A tarsorrhaphy was performed using Frost sutures.
Body Location Override (Optional - Billing Will Still Be Based On Selected Body Map Location If Applicable): left cheek
Ear Wedge Repair Text: A wedge excision was completed by carrying down an excision through the full thickness of the ear and cartilage with an inward facing Burow's triangle. The wound was then closed in a layered fashion.
Epidermal Closure: running cuticular
Cartilage Graft Text: The defect edges were debeveled with a #15 scalpel blade.  Given the location of the defect, shape of the defect, the fact the defect involved a full thickness cartilage defect a cartilage graft was deemed most appropriate.  An appropriate donor site was identified, cleansed, and anesthetized. The cartilage graft was then harvested and transferred to the recipient site, oriented appropriately and then sutured into place.  The secondary defect was then repaired using a primary closure.
Wound Care: Aquaphor
Interpolation Flap Text: A decision was made to reconstruct the defect utilizing an interpolation axial flap and a staged reconstruction.  A telfa template was made of the defect.  This telfa template was then used to outline the interpolation flap.  The donor area for the pedicle flap was then injected with anesthesia.  The flap was excised through the skin and subcutaneous tissue down to the layer of the underlying musculature.  The interpolation flap was carefully excised within this deep plane to maintain its blood supply.  The edges of the donor site were undermined.   The donor site was closed in a primary fashion.  The pedicle was then rotated into position and sutured.  Once the tube was sutured into place, adequate blood supply was confirmed with blanching and refill.  The pedicle was then wrapped with xeroform gauze and dressed appropriately with a telfa and gauze bandage to ensure continued blood supply and protect the attached pedicle.
Purse String (Intermediate) Text: Given the location of the defect and the characteristics of the surrounding skin a purse string intermediate closure was deemed most appropriate.  Undermining was performed circumfirentially around the surgical defect.  A purse string suture was then placed and tightened.
Bcc Histology Text: There were numerous aggregates of basaloid cells.
Subsequent Stages Histo Method Verbiage: Using a similar technique to that described above, a thin layer of tissue was removed from all areas where tumor was visible on the previous stage.  The tissue was again oriented, mapped, dyed, and processed as above.
Post-Care Instructions: I reviewed with the patient in detail post-care instructions. Patient is not to engage in any heavy lifting, exercise, or swimming for the next 14 days. Should the patient develop any fevers, chills, bleeding, severe pain patient will contact the office immediately.
O-Z Plasty Text: The defect edges were debeveled with a #15 scalpel blade.  Given the location of the defect, shape of the defect and the proximity to free margins an O-Z plasty (double transposition flap) was deemed most appropriate.  Using a sterile surgical marker, the appropriate transposition flaps were drawn incorporating the defect and placing the expected incisions within the relaxed skin tension lines where possible.    The area thus outlined was incised deep to adipose tissue with a #15 scalpel blade.  The skin margins were undermined to an appropriate distance in all directions utilizing iris scissors.  Hemostasis was achieved with electrocautery.  The flaps were then transposed into place, one clockwise and the other counterclockwise, and anchored with interrupted buried subcutaneous sutures.
Closure 2 Information: This tab is for additional flaps and grafts, including complex repair and grafts and complex repair and flaps. You can also specify a different location for the additional defect, if the location is the same you do not need to select a new one. We will insert the automated text for the repair you select below just as we do for solitary flaps and grafts. Please note that at this time if you select a location with a different insurance zone you will need to override the ICD10 and CPT if appropriate.
Island Pedicle Flap-Requiring Vessel Identification Text: The defect edges were debeveled with a #15 scalpel blade.  Given the location of the defect, shape of the defect and the proximity to free margins an island pedicle advancement flap was deemed most appropriate.  Using a sterile surgical marker, an appropriate advancement flap was drawn, based on the axial vessel mentioned above, incorporating the defect, outlining the appropriate donor tissue and placing the expected incisions within the relaxed skin tension lines where possible.    The area thus outlined was incised deep to adipose tissue with a #15 scalpel blade.  The skin margins were undermined to an appropriate distance in all directions around the primary defect and laterally outward around the island pedicle utilizing iris scissors.  There was minimal undermining beneath the pedicle flap.
Transposition Flap Text: The defect edges were debeveled with a #15 scalpel blade.  Given the location of the defect and the proximity to free margins a transposition flap was deemed most appropriate.  Using a sterile surgical marker, an appropriate transposition flap was drawn incorporating the defect.    The area thus outlined was incised deep to adipose tissue with a #15 scalpel blade.  The skin margins were undermined to an appropriate distance in all directions utilizing iris scissors.
Mohs Method Verbiage: An incision at a 45 degree angle following the standard Mohs approach was done and the specimen was harvested as a microscopic controlled layer.
Crescentic Advancement Flap Text: The defect edges were debeveled with a #15 scalpel blade.  Given the location of the defect and the proximity to free margins a crescentic advancement flap was deemed most appropriate.  Using a sterile surgical marker, the appropriate advancement flap was drawn incorporating the defect and placing the expected incisions within the relaxed skin tension lines where possible.    The area thus outlined was incised deep to adipose tissue with a #15 scalpel blade.  The skin margins were undermined to an appropriate distance in all directions utilizing iris scissors.
Mastoid Interpolation Flap Text: A decision was made to reconstruct the defect utilizing an interpolation axial flap and a staged reconstruction.  A telfa template was made of the defect.  This telfa template was then used to outline the mastoid interpolation flap.  The donor area for the pedicle flap was then injected with anesthesia.  The flap was excised through the skin and subcutaneous tissue down to the layer of the underlying musculature.  The pedicle flap was carefully excised within this deep plane to maintain its blood supply.  The edges of the donor site were undermined.   The donor site was closed in a primary fashion.  The pedicle was then rotated into position and sutured.  Once the tube was sutured into place, adequate blood supply was confirmed with blanching and refill.  The pedicle was then wrapped with xeroform gauze and dressed appropriately with a telfa and gauze bandage to ensure continued blood supply and protect the attached pedicle.
Consent 2/Introductory Paragraph: Mohs surgery was explained to the patient and consent was obtained. The risks, benefits and alternatives to therapy were discussed in detail. Specifically, the risks of infection, scarring, bleeding, prolonged wound healing, incomplete removal, allergy to anesthesia, nerve injury and recurrence were addressed. Prior to the procedure, the treatment site was clearly identified and confirmed by the patient. All components of Universal Protocol/PAUSE Rule completed.
Spiral Flap Text: The defect edges were debeveled with a #15 scalpel blade.  Given the location of the defect, shape of the defect and the proximity to free margins a spiral flap was deemed most appropriate.  Using a sterile surgical marker, an appropriate rotation flap was drawn incorporating the defect and placing the expected incisions within the relaxed skin tension lines where possible. The area thus outlined was incised deep to adipose tissue with a #15 scalpel blade.  The skin margins were undermined to an appropriate distance in all directions utilizing iris scissors.
Dressing: pressure dressing with telfa
Dermal Autograft Text: The defect edges were debeveled with a #15 scalpel blade.  Given the location of the defect, shape of the defect and the proximity to free margins a dermal autograft was deemed most appropriate.  Using a sterile surgical marker, the primary defect shape was transferred to the donor site. The area thus outlined was incised deep to adipose tissue with a #15 scalpel blade.  The harvested graft was then trimmed of adipose and epidermal tissue until only dermis was left.  The skin graft was then placed in the primary defect and oriented appropriately.
Rhombic Flap Text: The defect edges were debeveled with a #15 scalpel blade.  Given the location of the defect and the proximity to free margins a rhombic flap was deemed most appropriate.  Using a sterile surgical marker, an appropriate rhombic flap was drawn incorporating the defect.    The area thus outlined was incised deep to adipose tissue with a #15 scalpel blade.  The skin margins were undermined to an appropriate distance in all directions utilizing iris scissors.
Complex Repair And Graft Additional Text (Will Appearing After The Standard Complex Repair Text): The complex repair was not sufficient to completely close the primary defect. The remaining additional defect was repaired with the graft mentioned below.
Consent (Near Eyelid Margin)/Introductory Paragraph: The rationale for Mohs was explained to the patient and consent was obtained. The risks, benefits and alternatives to therapy were discussed in detail. Specifically, the risks of ectropion or eyelid deformity, infection, scarring, bleeding, prolonged wound healing, incomplete removal, allergy to anesthesia, nerve injury and recurrence were addressed. Prior to the procedure, the treatment site was clearly identified and confirmed by the patient. All components of Universal Protocol/PAUSE Rule completed.
Referred To Oculoplastics For Closure Text (Leave Blank If You Do Not Want): After obtaining clear surgical margins the patient was sent to oculoplastics for surgical repair.  The patient understands they will receive post-surgical care and follow-up from the referring physician's office.
Referred To Plastics For Closure Text (Leave Blank If You Do Not Want): After obtaining clear surgical margins the patient was sent to plastics for surgical repair.  The patient understands they will receive post-surgical care and follow-up from the referring physician's office.
Advancement-Rotation Flap Text: The defect edges were debeveled with a #15 scalpel blade.  Given the location of the defect, shape of the defect and the proximity to free margins an advancement-rotation flap was deemed most appropriate.  Using a sterile surgical marker, an appropriate flap was drawn incorporating the defect and placing the expected incisions within the relaxed skin tension lines where possible. The area thus outlined was incised deep to adipose tissue with a #15 scalpel blade.  The skin margins were undermined to an appropriate distance in all directions utilizing iris scissors.
Referring Physician (Optional): Dr. Rocha
Tissue Cultured Epidermal Autograft Text: The defect edges were debeveled with a #15 scalpel blade.  Given the location of the defect, shape of the defect and the proximity to free margins a tissue cultured epidermal autograft was deemed most appropriate.  The graft was then trimmed to fit the size of the defect.  The graft was then placed in the primary defect and oriented appropriately.
Graft Donor Site Bandage (Optional-Leave Blank If You Don't Want In Note): Aquaplast was fitted to the graft site and sewn into place. A pressure bandage were applied to the donor site and over the aquaplast bolster.
Dorsal Nasal Flap Text: The defect edges were debeveled with a #15 scalpel blade.  Given the location of the defect and the proximity to free margins a dorsal nasal flap was deemed most appropriate.  Using a sterile surgical marker, an appropriate dorsal nasal flap was drawn around the defect.    The area thus outlined was incised deep to adipose tissue with a #15 scalpel blade.  The skin margins were undermined to an appropriate distance in all directions utilizing iris scissors.
Medical Necessity Statement: Based on my medical judgement, Mohs surgery is the most appropriate treatment for this cancer compared to other treatments.
Xenograft Text: The defect edges were debeveled with a #15 scalpel blade.  Given the location of the defect, shape of the defect and the proximity to free margins a xenograft was deemed most appropriate.  The graft was then trimmed to fit the size of the defect.  The graft was then placed in the primary defect and oriented appropriately.
Bilobed Flap Text: The defect edges were debeveled with a #15 scalpel blade.  Given the location of the defect and the proximity to free margins a bilobe flap was deemed most appropriate.  Using a sterile surgical marker, an appropriate bilobe flap drawn around the defect.    The area thus outlined was incised deep to adipose tissue with a #15 scalpel blade.  The skin margins were undermined to an appropriate distance in all directions utilizing iris scissors.

## 2017-11-07 ENCOUNTER — APPOINTMENT (RX ONLY)
Dept: URBAN - NONMETROPOLITAN AREA CLINIC 1 | Facility: CLINIC | Age: 72
Setting detail: DERMATOLOGY
End: 2017-11-07

## 2017-11-07 DIAGNOSIS — Z48.02 ENCOUNTER FOR REMOVAL OF SUTURES: ICD-10-CM

## 2017-11-07 DIAGNOSIS — Z85.828 PERSONAL HISTORY OF OTHER MALIGNANT NEOPLASM OF SKIN: ICD-10-CM

## 2017-11-07 PROCEDURE — 99212 OFFICE O/P EST SF 10 MIN: CPT

## 2017-11-07 PROCEDURE — ? SUTURE REMOVAL (GLOBAL PERIOD)

## 2017-11-07 PROCEDURE — ? COUNSELING

## 2017-11-07 ASSESSMENT — LOCATION ZONE DERM: LOCATION ZONE: FACE

## 2017-11-07 ASSESSMENT — LOCATION DETAILED DESCRIPTION DERM
LOCATION DETAILED: LEFT LATERAL MALAR CHEEK
LOCATION DETAILED: LEFT SUPERIOR CENTRAL MALAR CHEEK

## 2017-11-07 ASSESSMENT — LOCATION SIMPLE DESCRIPTION DERM: LOCATION SIMPLE: LEFT CHEEK

## 2017-11-07 NOTE — PROCEDURE: SUTURE REMOVAL (GLOBAL PERIOD)
Detail Level: Detailed
Add 30719 Cpt? (Important Note: In 2017 The Use Of 83971 Is Being Tracked By Cms To Determine Future Global Period Reimbursement For Global Periods): no

## 2020-01-05 ENCOUNTER — HOSPITAL ENCOUNTER (OUTPATIENT)
Dept: RADIOLOGY | Facility: MEDICAL CENTER | Age: 75
End: 2020-01-05

## 2020-01-05 ENCOUNTER — HOSPITAL ENCOUNTER (INPATIENT)
Facility: MEDICAL CENTER | Age: 75
LOS: 10 days | DRG: 439 | End: 2020-01-15
Attending: EMERGENCY MEDICINE | Admitting: INTERNAL MEDICINE
Payer: MEDICARE

## 2020-01-05 DIAGNOSIS — K85.90 ACUTE PANCREATITIS WITHOUT INFECTION OR NECROSIS, UNSPECIFIED PANCREATITIS TYPE: ICD-10-CM

## 2020-01-05 DIAGNOSIS — N17.9 ACUTE RENAL FAILURE, UNSPECIFIED ACUTE RENAL FAILURE TYPE (HCC): ICD-10-CM

## 2020-01-05 DIAGNOSIS — E87.29 ALCOHOLIC KETOACIDOSIS: ICD-10-CM

## 2020-01-05 DIAGNOSIS — K80.20 CALCULUS OF GALLBLADDER WITHOUT CHOLECYSTITIS WITHOUT OBSTRUCTION: ICD-10-CM

## 2020-01-05 DIAGNOSIS — E16.2 HYPOGLYCEMIA: ICD-10-CM

## 2020-01-05 DIAGNOSIS — F10.10 ALCOHOL ABUSE: ICD-10-CM

## 2020-01-05 DIAGNOSIS — K85.90 ACUTE PANCREATITIS, UNSPECIFIED COMPLICATION STATUS, UNSPECIFIED PANCREATITIS TYPE: ICD-10-CM

## 2020-01-05 DIAGNOSIS — R53.1 GENERAL WEAKNESS: ICD-10-CM

## 2020-01-05 DIAGNOSIS — N17.0 ACUTE KIDNEY INJURY (AKI) WITH ACUTE TUBULAR NECROSIS (ATN) (HCC): ICD-10-CM

## 2020-01-05 DIAGNOSIS — K85.20 ALCOHOL-INDUCED ACUTE PANCREATITIS, UNSPECIFIED COMPLICATION STATUS: ICD-10-CM

## 2020-01-05 LAB
ALBUMIN SERPL BCP-MCNC: 3 G/DL (ref 3.2–4.9)
ALBUMIN/GLOB SERPL: 1.2 G/DL
ALP SERPL-CCNC: 75 U/L (ref 30–99)
ALT SERPL-CCNC: 49 U/L (ref 2–50)
ANION GAP SERPL CALC-SCNC: 20 MMOL/L (ref 0–11.9)
ANISOCYTOSIS BLD QL SMEAR: ABNORMAL
AST SERPL-CCNC: 115 U/L (ref 12–45)
BASE EXCESS BLDV CALC-SCNC: -9 MMOL/L
BASOPHILS # BLD AUTO: 0.9 % (ref 0–1.8)
BASOPHILS # BLD: 0.04 K/UL (ref 0–0.12)
BILIRUB SERPL-MCNC: 0.9 MG/DL (ref 0.1–1.5)
BODY TEMPERATURE: ABNORMAL CENTIGRADE
BUN SERPL-MCNC: 35 MG/DL (ref 8–22)
CALCIUM SERPL-MCNC: 7.6 MG/DL (ref 8.5–10.5)
CHLORIDE SERPL-SCNC: 102 MMOL/L (ref 96–112)
CO2 SERPL-SCNC: 18 MMOL/L (ref 20–33)
CREAT SERPL-MCNC: 2.32 MG/DL (ref 0.5–1.4)
EKG IMPRESSION: NORMAL
EOSINOPHIL # BLD AUTO: 0 K/UL (ref 0–0.51)
EOSINOPHIL NFR BLD: 0 % (ref 0–6.9)
ERYTHROCYTE [DISTWIDTH] IN BLOOD BY AUTOMATED COUNT: 50 FL (ref 35.9–50)
GLOBULIN SER CALC-MCNC: 2.6 G/DL (ref 1.9–3.5)
GLUCOSE BLD-MCNC: 69 MG/DL (ref 65–99)
GLUCOSE SERPL-MCNC: 65 MG/DL (ref 65–99)
HCO3 BLDV-SCNC: 17 MMOL/L (ref 24–28)
HCT VFR BLD AUTO: 26.6 % (ref 42–52)
HGB BLD-MCNC: 8.9 G/DL (ref 14–18)
LACTATE BLD-SCNC: 1.9 MMOL/L (ref 0.5–2)
LIPASE SERPL-CCNC: 1958 U/L (ref 11–82)
LYMPHOCYTES # BLD AUTO: 0.74 K/UL (ref 1–4.8)
LYMPHOCYTES NFR BLD: 17.7 % (ref 22–41)
MACROCYTES BLD QL SMEAR: ABNORMAL
MANUAL DIFF BLD: ABNORMAL
MCH RBC QN AUTO: 34 PG (ref 27–33)
MCHC RBC AUTO-ENTMCNC: 33.5 G/DL (ref 33.7–35.3)
MCV RBC AUTO: 101.5 FL (ref 81.4–97.8)
METAMYELOCYTES NFR BLD MANUAL: 0.9 %
MONOCYTES # BLD AUTO: 0.15 K/UL (ref 0–0.85)
MONOCYTES NFR BLD AUTO: 3.5 % (ref 0–13.4)
MORPHOLOGY BLD-IMP: NORMAL
NEUTROPHILS # BLD AUTO: 3.23 K/UL (ref 1.82–7.42)
NEUTROPHILS NFR BLD: 53.1 % (ref 44–72)
NEUTS BAND NFR BLD MANUAL: 23.9 % (ref 0–10)
NRBC # BLD AUTO: 0 K/UL
NRBC BLD-RTO: 0 /100 WBC
PCO2 BLDV: 35.7 MMHG (ref 41–51)
PH BLDV: 7.29 [PH] (ref 7.31–7.45)
PLATELET # BLD AUTO: 73 K/UL (ref 164–446)
PLATELET BLD QL SMEAR: NORMAL
PMV BLD AUTO: 9.7 FL (ref 9–12.9)
PO2 BLDV: 28.7 MMHG (ref 25–40)
POTASSIUM SERPL-SCNC: 3.5 MMOL/L (ref 3.6–5.5)
PROT SERPL-MCNC: 5.6 G/DL (ref 6–8.2)
RBC # BLD AUTO: 2.62 M/UL (ref 4.7–6.1)
RBC BLD AUTO: PRESENT
SAO2 % BLDV: 48.2 %
SMUDGE CELLS BLD QL SMEAR: NORMAL
SODIUM SERPL-SCNC: 140 MMOL/L (ref 135–145)
TROPONIN T SERPL-MCNC: 36 NG/L (ref 6–19)
WBC # BLD AUTO: 4.2 K/UL (ref 4.8–10.8)

## 2020-01-05 PROCEDURE — 80074 ACUTE HEPATITIS PANEL: CPT

## 2020-01-05 PROCEDURE — 82962 GLUCOSE BLOOD TEST: CPT

## 2020-01-05 PROCEDURE — 770020 HCHG ROOM/CARE - TELE (206)

## 2020-01-05 PROCEDURE — 82803 BLOOD GASES ANY COMBINATION: CPT

## 2020-01-05 PROCEDURE — 99285 EMERGENCY DEPT VISIT HI MDM: CPT

## 2020-01-05 PROCEDURE — 83690 ASSAY OF LIPASE: CPT

## 2020-01-05 PROCEDURE — 84484 ASSAY OF TROPONIN QUANT: CPT

## 2020-01-05 PROCEDURE — 700105 HCHG RX REV CODE 258: Performed by: EMERGENCY MEDICINE

## 2020-01-05 PROCEDURE — 85027 COMPLETE CBC AUTOMATED: CPT

## 2020-01-05 PROCEDURE — 80053 COMPREHEN METABOLIC PANEL: CPT

## 2020-01-05 PROCEDURE — 83605 ASSAY OF LACTIC ACID: CPT

## 2020-01-05 PROCEDURE — 93005 ELECTROCARDIOGRAM TRACING: CPT | Performed by: EMERGENCY MEDICINE

## 2020-01-05 PROCEDURE — 85007 BL SMEAR W/DIFF WBC COUNT: CPT

## 2020-01-05 RX ORDER — POTASSIUM CHLORIDE 7.45 MG/ML
10 INJECTION INTRAVENOUS
Status: COMPLETED | OUTPATIENT
Start: 2020-01-06 | End: 2020-01-06

## 2020-01-05 RX ORDER — PROCHLORPERAZINE EDISYLATE 5 MG/ML
10 INJECTION INTRAMUSCULAR; INTRAVENOUS EVERY 4 HOURS PRN
Status: DISCONTINUED | OUTPATIENT
Start: 2020-01-05 | End: 2020-01-14

## 2020-01-05 RX ORDER — POLYETHYLENE GLYCOL 3350 17 G/17G
1 POWDER, FOR SOLUTION ORAL
Status: DISCONTINUED | OUTPATIENT
Start: 2020-01-05 | End: 2020-01-07

## 2020-01-05 RX ORDER — POTASSIUM CHLORIDE 20 MEQ/1
20 TABLET, EXTENDED RELEASE ORAL DAILY
Status: DISCONTINUED | OUTPATIENT
Start: 2020-01-06 | End: 2020-01-05

## 2020-01-05 RX ORDER — HEPARIN SODIUM 5000 [USP'U]/ML
5000 INJECTION, SOLUTION INTRAVENOUS; SUBCUTANEOUS EVERY 8 HOURS
Status: DISCONTINUED | OUTPATIENT
Start: 2020-01-06 | End: 2020-01-06

## 2020-01-05 RX ORDER — OMEPRAZOLE 20 MG/1
20 CAPSULE, DELAYED RELEASE ORAL DAILY
Status: DISCONTINUED | OUTPATIENT
Start: 2020-01-06 | End: 2020-01-06

## 2020-01-05 RX ORDER — NICOTINE 21 MG/24HR
21 PATCH, TRANSDERMAL 24 HOURS TRANSDERMAL
Status: DISCONTINUED | OUTPATIENT
Start: 2020-01-06 | End: 2020-01-15 | Stop reason: HOSPADM

## 2020-01-05 RX ORDER — DEXTROSE AND SODIUM CHLORIDE 5; .9 G/100ML; G/100ML
INJECTION, SOLUTION INTRAVENOUS CONTINUOUS
Status: DISCONTINUED | OUTPATIENT
Start: 2020-01-05 | End: 2020-01-06

## 2020-01-05 RX ORDER — IBUPROFEN 200 MG
400 TABLET ORAL EVERY 6 HOURS PRN
Status: ON HOLD | COMMUNITY
End: 2020-01-15

## 2020-01-05 RX ORDER — BISACODYL 10 MG
10 SUPPOSITORY, RECTAL RECTAL
Status: DISCONTINUED | OUTPATIENT
Start: 2020-01-05 | End: 2020-01-07

## 2020-01-05 RX ORDER — MORPHINE SULFATE 4 MG/ML
1 INJECTION, SOLUTION INTRAMUSCULAR; INTRAVENOUS
Status: DISCONTINUED | OUTPATIENT
Start: 2020-01-05 | End: 2020-01-07

## 2020-01-05 RX ORDER — ACETAMINOPHEN 325 MG/1
650 TABLET ORAL EVERY 6 HOURS PRN
Status: DISCONTINUED | OUTPATIENT
Start: 2020-01-05 | End: 2020-01-07

## 2020-01-05 RX ORDER — POTASSIUM CHLORIDE 20 MEQ/1
20 TABLET, EXTENDED RELEASE ORAL ONCE
Status: COMPLETED | OUTPATIENT
Start: 2020-01-05 | End: 2020-01-06

## 2020-01-05 RX ORDER — ONDANSETRON 2 MG/ML
4 INJECTION INTRAMUSCULAR; INTRAVENOUS EVERY 4 HOURS PRN
Status: DISCONTINUED | OUTPATIENT
Start: 2020-01-05 | End: 2020-01-05

## 2020-01-05 RX ORDER — AMOXICILLIN 250 MG
2 CAPSULE ORAL 2 TIMES DAILY
Status: DISCONTINUED | OUTPATIENT
Start: 2020-01-05 | End: 2020-01-07

## 2020-01-05 RX ORDER — DEXTROSE AND SODIUM CHLORIDE 5; .9 G/100ML; G/100ML
INJECTION, SOLUTION INTRAVENOUS ONCE
Status: COMPLETED | OUTPATIENT
Start: 2020-01-05 | End: 2020-01-05

## 2020-01-05 RX ADMIN — DEXTROSE AND SODIUM CHLORIDE: 5; 900 INJECTION, SOLUTION INTRAVENOUS at 21:52

## 2020-01-05 ASSESSMENT — LIFESTYLE VARIABLES
DO YOU DRINK ALCOHOL: YES
HAVE PEOPLE ANNOYED YOU BY CRITICIZING YOUR DRINKING: NO
TOTAL SCORE: 0
EVER FELT BAD OR GUILTY ABOUT YOUR DRINKING: NO
DOES PATIENT WANT TO STOP DRINKING: NO
TOTAL SCORE: 0
HAVE YOU EVER FELT YOU SHOULD CUT DOWN ON YOUR DRINKING: NO
TOTAL SCORE: 0
EVER HAD A DRINK FIRST THING IN THE MORNING TO STEADY YOUR NERVES TO GET RID OF A HANGOVER: NO
CONSUMPTION TOTAL: INCOMPLETE

## 2020-01-06 ENCOUNTER — APPOINTMENT (OUTPATIENT)
Dept: CARDIOLOGY | Facility: MEDICAL CENTER | Age: 75
DRG: 439 | End: 2020-01-06
Attending: STUDENT IN AN ORGANIZED HEALTH CARE EDUCATION/TRAINING PROGRAM
Payer: MEDICARE

## 2020-01-06 ENCOUNTER — APPOINTMENT (OUTPATIENT)
Dept: RADIOLOGY | Facility: MEDICAL CENTER | Age: 75
DRG: 439 | End: 2020-01-06
Attending: STUDENT IN AN ORGANIZED HEALTH CARE EDUCATION/TRAINING PROGRAM
Payer: MEDICARE

## 2020-01-06 PROBLEM — E87.8 ELECTROLYTE ABNORMALITY: Status: ACTIVE | Noted: 2020-01-06

## 2020-01-06 PROBLEM — R79.89 ELEVATED TROPONIN: Status: ACTIVE | Noted: 2020-01-06

## 2020-01-06 PROBLEM — N17.0 ACUTE KIDNEY INJURY (AKI) WITH ACUTE TUBULAR NECROSIS (ATN) (HCC): Status: ACTIVE | Noted: 2020-01-06

## 2020-01-06 PROBLEM — K85.90 ACUTE PANCREATITIS: Status: ACTIVE | Noted: 2020-01-06

## 2020-01-06 PROBLEM — E87.20 LACTIC ACIDOSIS: Status: ACTIVE | Noted: 2020-01-06

## 2020-01-06 PROBLEM — K80.20 CHOLELITHIASIS: Status: ACTIVE | Noted: 2020-01-06

## 2020-01-06 PROBLEM — E83.51 HYPOCALCEMIA: Status: ACTIVE | Noted: 2020-01-06

## 2020-01-06 PROBLEM — R74.01 TRANSAMINITIS: Status: ACTIVE | Noted: 2020-01-06

## 2020-01-06 PROBLEM — D61.818 PANCYTOPENIA (HCC): Status: ACTIVE | Noted: 2020-01-06

## 2020-01-06 LAB
ALBUMIN SERPL BCP-MCNC: 2.3 G/DL (ref 3.2–4.9)
ALBUMIN/GLOB SERPL: 1.2 G/DL
ALP SERPL-CCNC: 55 U/L (ref 30–99)
ALT SERPL-CCNC: 37 U/L (ref 2–50)
AMPHET UR QL SCN: NEGATIVE
ANION GAP SERPL CALC-SCNC: 16 MMOL/L (ref 0–11.9)
ANION GAP SERPL CALC-SCNC: 17 MMOL/L (ref 0–11.9)
APAP SERPL-MCNC: <10 UG/ML (ref 10–30)
APPEARANCE UR: ABNORMAL
AST SERPL-CCNC: 84 U/L (ref 12–45)
BACTERIA #/AREA URNS HPF: NEGATIVE /HPF
BARBITURATES UR QL SCN: NEGATIVE
BASOPHILS # BLD AUTO: 0 % (ref 0–1.8)
BASOPHILS # BLD: 0 K/UL (ref 0–0.12)
BENZODIAZ UR QL SCN: NEGATIVE
BILIRUB SERPL-MCNC: 0.6 MG/DL (ref 0.1–1.5)
BILIRUB UR QL STRIP.AUTO: NEGATIVE
BUN SERPL-MCNC: 31 MG/DL (ref 8–22)
BUN SERPL-MCNC: 36 MG/DL (ref 8–22)
BZE UR QL SCN: NEGATIVE
CALCIUM SERPL-MCNC: 6.1 MG/DL (ref 8.5–10.5)
CALCIUM SERPL-MCNC: 8 MG/DL (ref 8.5–10.5)
CANNABINOIDS UR QL SCN: NEGATIVE
CHLORIDE SERPL-SCNC: 105 MMOL/L (ref 96–112)
CHLORIDE SERPL-SCNC: 110 MMOL/L (ref 96–112)
CHOLEST SERPL-MCNC: 80 MG/DL (ref 100–199)
CO2 SERPL-SCNC: 15 MMOL/L (ref 20–33)
CO2 SERPL-SCNC: 16 MMOL/L (ref 20–33)
COLOR UR: ABNORMAL
CREAT SERPL-MCNC: 1.97 MG/DL (ref 0.5–1.4)
CREAT SERPL-MCNC: 2.14 MG/DL (ref 0.5–1.4)
EOSINOPHIL # BLD AUTO: 0 K/UL (ref 0–0.51)
EOSINOPHIL NFR BLD: 0 % (ref 0–6.9)
EPI CELLS #/AREA URNS HPF: ABNORMAL /HPF
ERYTHROCYTE [DISTWIDTH] IN BLOOD BY AUTOMATED COUNT: 50.3 FL (ref 35.9–50)
FERRITIN SERPL-MCNC: 245.5 NG/ML (ref 22–322)
FOLATE SERPL-MCNC: 23.7 NG/ML
GLOBULIN SER CALC-MCNC: 2 G/DL (ref 1.9–3.5)
GLUCOSE BLD-MCNC: 178 MG/DL (ref 65–99)
GLUCOSE SERPL-MCNC: 101 MG/DL (ref 65–99)
GLUCOSE SERPL-MCNC: 152 MG/DL (ref 65–99)
GLUCOSE UR STRIP.AUTO-MCNC: NEGATIVE MG/DL
HAV IGM SERPL QL IA: NEGATIVE
HBV CORE IGM SER QL: NEGATIVE
HBV SURFACE AG SER QL: NEGATIVE
HCT VFR BLD AUTO: 22.1 % (ref 42–52)
HCV AB SER QL: NEGATIVE
HDLC SERPL-MCNC: 15 MG/DL
HGB BLD-MCNC: 7.3 G/DL (ref 14–18)
HYALINE CASTS #/AREA URNS LPF: ABNORMAL /LPF
IRON SATN MFR SERPL: 7 % (ref 15–55)
IRON SERPL-MCNC: 13 UG/DL (ref 50–180)
KETONES UR STRIP.AUTO-MCNC: 15 MG/DL
LDLC SERPL CALC-MCNC: 27 MG/DL
LEUKOCYTE ESTERASE UR QL STRIP.AUTO: NEGATIVE
LV EJECT FRACT  99904: 55
LV EJECT FRACT MOD 2C 99903: 60
LV EJECT FRACT MOD 4C 99902: 47.11
LV EJECT FRACT MOD BP 99901: 53.21
LYMPHOCYTES # BLD AUTO: 0.43 K/UL (ref 1–4.8)
LYMPHOCYTES NFR BLD: 10.5 % (ref 22–41)
MAGNESIUM SERPL-MCNC: 1.1 MG/DL (ref 1.5–2.5)
MAGNESIUM SERPL-MCNC: 2.4 MG/DL (ref 1.5–2.5)
MANUAL DIFF BLD: ABNORMAL
MCH RBC QN AUTO: 34 PG (ref 27–33)
MCHC RBC AUTO-ENTMCNC: 33 G/DL (ref 33.7–35.3)
MCV RBC AUTO: 102.8 FL (ref 81.4–97.8)
METAMYELOCYTES NFR BLD MANUAL: 0.9 %
METHADONE UR QL SCN: NEGATIVE
MICRO URNS: ABNORMAL
MONOCYTES # BLD AUTO: 0.14 K/UL (ref 0–0.85)
MONOCYTES NFR BLD AUTO: 3.5 % (ref 0–13.4)
MORPHOLOGY BLD-IMP: NORMAL
NEUTROPHILS # BLD AUTO: 3.49 K/UL (ref 1.82–7.42)
NEUTROPHILS NFR BLD: 70.2 % (ref 44–72)
NEUTS BAND NFR BLD MANUAL: 14.9 % (ref 0–10)
NITRITE UR QL STRIP.AUTO: NEGATIVE
NRBC # BLD AUTO: 0 K/UL
NRBC BLD-RTO: 0 /100 WBC
OPIATES UR QL SCN: POSITIVE
OXYCODONE UR QL SCN: NEGATIVE
PCP UR QL SCN: NEGATIVE
PH UR STRIP.AUTO: 5.5 [PH] (ref 5–8)
PHOSPHATE SERPL-MCNC: 2.5 MG/DL (ref 2.5–4.5)
PLATELET # BLD AUTO: 55 K/UL (ref 164–446)
PLATELET BLD QL SMEAR: NORMAL
PMV BLD AUTO: 9.8 FL (ref 9–12.9)
POTASSIUM SERPL-SCNC: 3 MMOL/L (ref 3.6–5.5)
POTASSIUM SERPL-SCNC: 4.2 MMOL/L (ref 3.6–5.5)
PROCALCITONIN SERPL-MCNC: 1.15 NG/ML
PROPOXYPH UR QL SCN: NEGATIVE
PROT SERPL-MCNC: 4.3 G/DL (ref 6–8.2)
PROT UR QL STRIP: 100 MG/DL
RBC # BLD AUTO: 2.15 M/UL (ref 4.7–6.1)
RBC # URNS HPF: ABNORMAL /HPF
RBC BLD AUTO: NORMAL
RBC UR QL AUTO: ABNORMAL
SALICYLATES SERPL-MCNC: 0 MG/DL (ref 15–25)
SODIUM SERPL-SCNC: 137 MMOL/L (ref 135–145)
SODIUM SERPL-SCNC: 142 MMOL/L (ref 135–145)
SP GR UR STRIP.AUTO: 1.02
TIBC SERPL-MCNC: 178 UG/DL (ref 250–450)
TRANSFERRIN SERPL-MCNC: 129 MG/DL (ref 200–370)
TRIGL SERPL-MCNC: 190 MG/DL (ref 0–149)
TROPONIN T SERPL-MCNC: 40 NG/L (ref 6–19)
TSH SERPL DL<=0.005 MIU/L-ACNC: 3.96 UIU/ML (ref 0.38–5.33)
UROBILINOGEN UR STRIP.AUTO-MCNC: 1 MG/DL
VIT B12 SERPL-MCNC: 279 PG/ML (ref 211–911)
WBC # BLD AUTO: 4.1 K/UL (ref 4.8–10.8)
WBC #/AREA URNS HPF: ABNORMAL /HPF

## 2020-01-06 PROCEDURE — 84443 ASSAY THYROID STIM HORMONE: CPT

## 2020-01-06 PROCEDURE — 84484 ASSAY OF TROPONIN QUANT: CPT

## 2020-01-06 PROCEDURE — 700105 HCHG RX REV CODE 258: Performed by: STUDENT IN AN ORGANIZED HEALTH CARE EDUCATION/TRAINING PROGRAM

## 2020-01-06 PROCEDURE — 82728 ASSAY OF FERRITIN: CPT

## 2020-01-06 PROCEDURE — 82746 ASSAY OF FOLIC ACID SERUM: CPT

## 2020-01-06 PROCEDURE — 770020 HCHG ROOM/CARE - TELE (206)

## 2020-01-06 PROCEDURE — 700102 HCHG RX REV CODE 250 W/ 637 OVERRIDE(OP): Performed by: STUDENT IN AN ORGANIZED HEALTH CARE EDUCATION/TRAINING PROGRAM

## 2020-01-06 PROCEDURE — 84466 ASSAY OF TRANSFERRIN: CPT

## 2020-01-06 PROCEDURE — 700105 HCHG RX REV CODE 258

## 2020-01-06 PROCEDURE — 84100 ASSAY OF PHOSPHORUS: CPT

## 2020-01-06 PROCEDURE — 83550 IRON BINDING TEST: CPT

## 2020-01-06 PROCEDURE — 700101 HCHG RX REV CODE 250: Performed by: STUDENT IN AN ORGANIZED HEALTH CARE EDUCATION/TRAINING PROGRAM

## 2020-01-06 PROCEDURE — 83735 ASSAY OF MAGNESIUM: CPT

## 2020-01-06 PROCEDURE — 80061 LIPID PANEL: CPT

## 2020-01-06 PROCEDURE — 82607 VITAMIN B-12: CPT

## 2020-01-06 PROCEDURE — 92610 EVALUATE SWALLOWING FUNCTION: CPT

## 2020-01-06 PROCEDURE — 96365 THER/PROPH/DIAG IV INF INIT: CPT

## 2020-01-06 PROCEDURE — 93306 TTE W/DOPPLER COMPLETE: CPT

## 2020-01-06 PROCEDURE — 83540 ASSAY OF IRON: CPT

## 2020-01-06 PROCEDURE — 700111 HCHG RX REV CODE 636 W/ 250 OVERRIDE (IP): Performed by: STUDENT IN AN ORGANIZED HEALTH CARE EDUCATION/TRAINING PROGRAM

## 2020-01-06 PROCEDURE — 74181 MRI ABDOMEN W/O CONTRAST: CPT

## 2020-01-06 PROCEDURE — 36415 COLL VENOUS BLD VENIPUNCTURE: CPT

## 2020-01-06 PROCEDURE — A9270 NON-COVERED ITEM OR SERVICE: HCPCS | Performed by: STUDENT IN AN ORGANIZED HEALTH CARE EDUCATION/TRAINING PROGRAM

## 2020-01-06 PROCEDURE — 80053 COMPREHEN METABOLIC PANEL: CPT

## 2020-01-06 PROCEDURE — 82105 ALPHA-FETOPROTEIN SERUM: CPT

## 2020-01-06 PROCEDURE — 85007 BL SMEAR W/DIFF WBC COUNT: CPT

## 2020-01-06 PROCEDURE — 85027 COMPLETE CBC AUTOMATED: CPT

## 2020-01-06 PROCEDURE — 80048 BASIC METABOLIC PNL TOTAL CA: CPT

## 2020-01-06 PROCEDURE — 81001 URINALYSIS AUTO W/SCOPE: CPT

## 2020-01-06 PROCEDURE — 93306 TTE W/DOPPLER COMPLETE: CPT | Mod: 26 | Performed by: INTERNAL MEDICINE

## 2020-01-06 PROCEDURE — 84145 PROCALCITONIN (PCT): CPT

## 2020-01-06 PROCEDURE — 99223 1ST HOSP IP/OBS HIGH 75: CPT | Mod: GC | Performed by: INTERNAL MEDICINE

## 2020-01-06 PROCEDURE — C9113 INJ PANTOPRAZOLE SODIUM, VIA: HCPCS | Performed by: STUDENT IN AN ORGANIZED HEALTH CARE EDUCATION/TRAINING PROGRAM

## 2020-01-06 PROCEDURE — 80307 DRUG TEST PRSMV CHEM ANLYZR: CPT

## 2020-01-06 PROCEDURE — 51798 US URINE CAPACITY MEASURE: CPT

## 2020-01-06 PROCEDURE — 82962 GLUCOSE BLOOD TEST: CPT

## 2020-01-06 RX ORDER — SODIUM CHLORIDE 9 MG/ML
INJECTION, SOLUTION INTRAVENOUS
Status: COMPLETED
Start: 2020-01-06 | End: 2020-01-06

## 2020-01-06 RX ORDER — CALCIUM CARBONATE 500 MG/1
1000 TABLET, CHEWABLE ORAL 3 TIMES DAILY
Status: DISCONTINUED | OUTPATIENT
Start: 2020-01-06 | End: 2020-01-07

## 2020-01-06 RX ORDER — PANTOPRAZOLE SODIUM 40 MG/10ML
40 INJECTION, POWDER, LYOPHILIZED, FOR SOLUTION INTRAVENOUS 2 TIMES DAILY
Status: DISCONTINUED | OUTPATIENT
Start: 2020-01-06 | End: 2020-01-08

## 2020-01-06 RX ORDER — MAGNESIUM SULFATE HEPTAHYDRATE 40 MG/ML
4 INJECTION, SOLUTION INTRAVENOUS ONCE
Status: COMPLETED | OUTPATIENT
Start: 2020-01-06 | End: 2020-01-06

## 2020-01-06 RX ORDER — SODIUM CHLORIDE, SODIUM LACTATE, POTASSIUM CHLORIDE, CALCIUM CHLORIDE 600; 310; 30; 20 MG/100ML; MG/100ML; MG/100ML; MG/100ML
INJECTION, SOLUTION INTRAVENOUS CONTINUOUS
Status: DISCONTINUED | OUTPATIENT
Start: 2020-01-06 | End: 2020-01-12

## 2020-01-06 RX ORDER — CALCIUM CHLORIDE 100 MG/ML
1 INJECTION INTRAVENOUS; INTRAVENTRICULAR ONCE
Status: DISCONTINUED | OUTPATIENT
Start: 2020-01-06 | End: 2020-01-06

## 2020-01-06 RX ORDER — TAMSULOSIN HYDROCHLORIDE 0.4 MG/1
0.4 CAPSULE ORAL
Status: DISCONTINUED | OUTPATIENT
Start: 2020-01-06 | End: 2020-01-07

## 2020-01-06 RX ORDER — CALCIUM CARBONATE 500 MG/1
1000 TABLET, CHEWABLE ORAL 3 TIMES DAILY
Status: DISCONTINUED | OUTPATIENT
Start: 2020-01-06 | End: 2020-01-06

## 2020-01-06 RX ORDER — THIAMINE MONONITRATE (VIT B1) 100 MG
100 TABLET ORAL DAILY
Status: DISCONTINUED | OUTPATIENT
Start: 2020-01-06 | End: 2020-01-07

## 2020-01-06 RX ADMIN — PIPERACILLIN AND TAZOBACTAM 4.5 G: 4; .5 INJECTION, POWDER, LYOPHILIZED, FOR SOLUTION INTRAVENOUS; PARENTERAL at 12:12

## 2020-01-06 RX ADMIN — PIPERACILLIN AND TAZOBACTAM 4.5 G: 4; .5 INJECTION, POWDER, LYOPHILIZED, FOR SOLUTION INTRAVENOUS; PARENTERAL at 15:39

## 2020-01-06 RX ADMIN — POTASSIUM CHLORIDE 10 MEQ: 10 INJECTION, SOLUTION INTRAVENOUS at 05:12

## 2020-01-06 RX ADMIN — MAGNESIUM SULFATE IN WATER 4 G: 40 INJECTION, SOLUTION INTRAVENOUS at 03:40

## 2020-01-06 RX ADMIN — PANTOPRAZOLE SODIUM 40 MG: 40 INJECTION, POWDER, LYOPHILIZED, FOR SOLUTION INTRAVENOUS at 15:39

## 2020-01-06 RX ADMIN — ACETAMINOPHEN 650 MG: 325 TABLET, FILM COATED ORAL at 02:40

## 2020-01-06 RX ADMIN — SODIUM CHLORIDE, POTASSIUM CHLORIDE, SODIUM LACTATE AND CALCIUM CHLORIDE: 600; 310; 30; 20 INJECTION, SOLUTION INTRAVENOUS at 12:15

## 2020-01-06 RX ADMIN — SODIUM CHLORIDE 500 ML: 9 INJECTION, SOLUTION INTRAVENOUS at 12:16

## 2020-01-06 RX ADMIN — POTASSIUM CHLORIDE 10 MEQ: 10 INJECTION, SOLUTION INTRAVENOUS at 03:05

## 2020-01-06 RX ADMIN — POTASSIUM CHLORIDE: 149 INJECTION, SOLUTION, CONCENTRATE INTRAVENOUS at 06:16

## 2020-01-06 RX ADMIN — POTASSIUM CHLORIDE 10 MEQ: 10 INJECTION, SOLUTION INTRAVENOUS at 00:22

## 2020-01-06 RX ADMIN — PIPERACILLIN AND TAZOBACTAM 4.5 G: 4; .5 INJECTION, POWDER, LYOPHILIZED, FOR SOLUTION INTRAVENOUS; PARENTERAL at 21:32

## 2020-01-06 RX ADMIN — CALCIUM GLUCONATE 2 G: 98 INJECTION, SOLUTION INTRAVENOUS at 04:30

## 2020-01-06 RX ADMIN — POTASSIUM CHLORIDE 10 MEQ: 10 INJECTION, SOLUTION INTRAVENOUS at 03:49

## 2020-01-06 RX ADMIN — THIAMINE HYDROCHLORIDE: 100 INJECTION, SOLUTION INTRAMUSCULAR; INTRAVENOUS at 10:38

## 2020-01-06 RX ADMIN — POTASSIUM CHLORIDE 20 MEQ: 1500 TABLET, EXTENDED RELEASE ORAL at 02:40

## 2020-01-06 RX ADMIN — MORPHINE SULFATE 1 MG: 4 INJECTION INTRAVENOUS at 03:23

## 2020-01-06 RX ADMIN — HEPARIN SODIUM 5000 UNITS: 5000 INJECTION, SOLUTION INTRAVENOUS; SUBCUTANEOUS at 04:42

## 2020-01-06 SDOH — ECONOMIC STABILITY: FOOD INSECURITY: WITHIN THE PAST 12 MONTHS, YOU WORRIED THAT YOUR FOOD WOULD RUN OUT BEFORE YOU GOT MONEY TO BUY MORE.: NEVER TRUE

## 2020-01-06 SDOH — HEALTH STABILITY: MENTAL HEALTH: HOW OFTEN DO YOU HAVE 6 OR MORE DRINKS ON ONE OCCASION?: DAILY OR ALMOST DAILY

## 2020-01-06 SDOH — HEALTH STABILITY: MENTAL HEALTH: HOW OFTEN DO YOU HAVE A DRINK CONTAINING ALCOHOL?: 4 OR MORE TIMES A WEEK

## 2020-01-06 SDOH — ECONOMIC STABILITY: TRANSPORTATION INSECURITY
IN THE PAST 12 MONTHS, HAS LACK OF TRANSPORTATION KEPT YOU FROM MEETINGS, WORK, OR FROM GETTING THINGS NEEDED FOR DAILY LIVING?: NO

## 2020-01-06 SDOH — ECONOMIC STABILITY: FOOD INSECURITY: WITHIN THE PAST 12 MONTHS, THE FOOD YOU BOUGHT JUST DIDN'T LAST AND YOU DIDN'T HAVE MONEY TO GET MORE.: NEVER TRUE

## 2020-01-06 SDOH — ECONOMIC STABILITY: TRANSPORTATION INSECURITY
IN THE PAST 12 MONTHS, HAS THE LACK OF TRANSPORTATION KEPT YOU FROM MEDICAL APPOINTMENTS OR FROM GETTING MEDICATIONS?: NO

## 2020-01-06 SDOH — HEALTH STABILITY: MENTAL HEALTH: HOW MANY STANDARD DRINKS CONTAINING ALCOHOL DO YOU HAVE ON A TYPICAL DAY?: 10 OR MORE

## 2020-01-06 ASSESSMENT — COPD QUESTIONNAIRES
COPD SCREENING SCORE: 5
DO YOU EVER COUGH UP ANY MUCUS OR PHLEGM?: NO/ONLY WITH OCCASIONAL COLDS OR INFECTIONS
HAVE YOU SMOKED AT LEAST 100 CIGARETTES IN YOUR ENTIRE LIFE: YES
DURING THE PAST 4 WEEKS HOW MUCH DID YOU FEEL SHORT OF BREATH: SOME OF THE TIME
HAVE YOU SMOKED AT LEAST 100 CIGARETTES IN YOUR ENTIRE LIFE: YES
DURING THE PAST 4 WEEKS HOW MUCH DID YOU FEEL SHORT OF BREATH: SOME OF THE TIME
COPD SCREENING SCORE: 5
DO YOU EVER COUGH UP ANY MUCUS OR PHLEGM?: NO/ONLY WITH OCCASIONAL COLDS OR INFECTIONS
IN THE PAST 12 MONTHS DO YOU DO LESS THAN YOU USED TO BECAUSE OF YOUR BREATHING PROBLEMS: DISAGREE/UNSURE

## 2020-01-06 ASSESSMENT — ENCOUNTER SYMPTOMS
TREMORS: 0
WEIGHT LOSS: 1
COUGH: 0
WHEEZING: 0
DIAPHORESIS: 0
TINGLING: 0
NAUSEA: 1
PHOTOPHOBIA: 0
ABDOMINAL PAIN: 1
HEMOPTYSIS: 0
FALLS: 0
DOUBLE VISION: 0
FLANK PAIN: 0
SENSORY CHANGE: 0
NECK PAIN: 0
EYE DISCHARGE: 0
SEIZURES: 0
BLOOD IN STOOL: 0
SHORTNESS OF BREATH: 0
BACK PAIN: 1
PALPITATIONS: 0
VOMITING: 1
DEPRESSION: 0
EYE PAIN: 0
MYALGIAS: 0
CLAUDICATION: 0
BRUISES/BLEEDS EASILY: 0
ORTHOPNEA: 0
LOSS OF CONSCIOUSNESS: 0
SPUTUM PRODUCTION: 0
FEVER: 0
WEAKNESS: 1
HEARTBURN: 1
DIARRHEA: 1
HALLUCINATIONS: 0
HEARTBURN: 0
CHILLS: 0
FOCAL WEAKNESS: 1
BLURRED VISION: 0
POLYDIPSIA: 0
HEADACHES: 0
DIZZINESS: 0

## 2020-01-06 ASSESSMENT — PATIENT HEALTH QUESTIONNAIRE - PHQ9
2. FEELING DOWN, DEPRESSED, IRRITABLE, OR HOPELESS: SEVERAL DAYS
3. TROUBLE FALLING OR STAYING ASLEEP OR SLEEPING TOO MUCH: NEARLY EVERY DAY
4. FEELING TIRED OR HAVING LITTLE ENERGY: NEARLY EVERY DAY
6. FEELING BAD ABOUT YOURSELF - OR THAT YOU ARE A FAILURE OR HAVE LET YOURSELF OR YOUR FAMILY DOWN: SEVERAL DAYS
7. TROUBLE CONCENTRATING ON THINGS, SUCH AS READING THE NEWSPAPER OR WATCHING TELEVISION: NOT AT ALL
SUM OF ALL RESPONSES TO PHQ9 QUESTIONS 1 AND 2: 2
5. POOR APPETITE OR OVEREATING: NEARLY EVERY DAY
8. MOVING OR SPEAKING SO SLOWLY THAT OTHER PEOPLE COULD HAVE NOTICED. OR THE OPPOSITE, BEING SO FIGETY OR RESTLESS THAT YOU HAVE BEEN MOVING AROUND A LOT MORE THAN USUAL: SEVERAL DAYS
SUM OF ALL RESPONSES TO PHQ QUESTIONS 1-9: 13
1. LITTLE INTEREST OR PLEASURE IN DOING THINGS: SEVERAL DAYS
9. THOUGHTS THAT YOU WOULD BE BETTER OFF DEAD, OR OF HURTING YOURSELF: NOT AT ALL

## 2020-01-06 ASSESSMENT — COGNITIVE AND FUNCTIONAL STATUS - GENERAL
CLIMB 3 TO 5 STEPS WITH RAILING: TOTAL
PERSONAL GROOMING: TOTAL
WALKING IN HOSPITAL ROOM: TOTAL
HELP NEEDED FOR BATHING: TOTAL
MOVING FROM LYING ON BACK TO SITTING ON SIDE OF FLAT BED: A LOT
MOVING TO AND FROM BED TO CHAIR: A LITTLE
SUGGESTED CMS G CODE MODIFIER MOBILITY: CL
DRESSING REGULAR UPPER BODY CLOTHING: TOTAL
SUGGESTED CMS G CODE MODIFIER DAILY ACTIVITY: CL
DAILY ACTIVITIY SCORE: 10
TOILETING: A LOT
EATING MEALS: A LITTLE
DRESSING REGULAR LOWER BODY CLOTHING: A LOT
MOBILITY SCORE: 10
TURNING FROM BACK TO SIDE WHILE IN FLAT BAD: A LOT
STANDING UP FROM CHAIR USING ARMS: TOTAL

## 2020-01-06 ASSESSMENT — LIFESTYLE VARIABLES
TOTAL SCORE: 2
TOTAL SCORE: 2
HAVE YOU EVER FELT YOU SHOULD CUT DOWN ON YOUR DRINKING: YES
CONSUMPTION TOTAL: POSITIVE
HAVE PEOPLE ANNOYED YOU BY CRITICIZING YOUR DRINKING: NO
AVERAGE NUMBER OF DAYS PER WEEK YOU HAVE A DRINK CONTAINING ALCOHOL: 7
ON A TYPICAL DAY WHEN YOU DRINK ALCOHOL HOW MANY DRINKS DO YOU HAVE: 10
EVER FELT BAD OR GUILTY ABOUT YOUR DRINKING: NO
DOES PATIENT WANT TO STOP DRINKING: NO
TOTAL SCORE: 2
EVER_SMOKED: YES
HOW MANY TIMES IN THE PAST YEAR HAVE YOU HAD 5 OR MORE DRINKS IN A DAY: 300
EVER HAD A DRINK FIRST THING IN THE MORNING TO STEADY YOUR NERVES TO GET RID OF A HANGOVER: YES
EVER_SMOKED: YES

## 2020-01-06 NOTE — ED NOTES
Technician from Lab called with critical result of calcium at 6.1. Critical lab result read back to technician.   Dr. Lei notified of critical lab result at 0056.  Critical lab result read back by Dr. Lei.

## 2020-01-06 NOTE — THERAPY
Speech Language Therapy Clinical Swallow Evaluation completed.  Functional Status: Pt seen on this date for a clinical swallow evaluation. Pt A&Ox3 with disorientation to event and pt's son present at bedside. Pt unable to get comfortable in session requiring frequent repositioning. Pt c/o generalized weakness and poor PO intake x2 weeks due to nausea/vomiting and he reported that even though he hasn't thrown up in 4-5 days he hasn't been able to eat/drink anything. Immediate coughing appreciated with single ice chip and delayed coughing appreciated with trials of NTL via cup sip, apple sauce, and water, which are concerning for aspiration. Upon palpation, laryngeal elevation was weak and incomplete. Pt only consumed 2 single ice chips, <1oz of NTL, <1oz of puree, and ~1oz of water before refusing further PO d/t 8/10 pain while swallowing and nausea, emesis bag provided. Pt reported a hx of intermittent reflux however it was worse today than usual and intermittent belching appreciated with minimal amount of PO. Delayed swallow trigger up to 20 seconds appreciated which pt reported was due to anticipating pain. At this time, unable to provide safe oral diet recommendation due to poor participation so would recommend continuation of NPO with re-evaluation of swallow on 1/7. Dysphagia education and recommendations provided to pt and his son and they verbalized understanding. Pt may benefit from non-oral source of nutrition if unable to meet nutritional needs/poor participation/continues to show s/sx of aspiration with re-evaluation tomorrow. SLP to follow.      Recommendations - Diet:  Continue NPO with re-evaluation on 1/7, may benefit from non-oral source of nutrition if unable to meet nutritional needs/poor participation/continues to show s/sx of aspiration with re-evaluation tomorrow                          Strategies: to be assessed                          Medication Administration: non-oral  Plan of Care: Will  "benefit from Speech Therapy 3 times per week  Post-Acute Therapy: Recommend inpatient transitional care services for continued speech therapy services.        See \"Rehab Therapy-Acute\" Patient Summary Report for complete documentation.           "

## 2020-01-06 NOTE — SENIOR ADMIT NOTE
[facility] [place] INTERNAL MEDICINE SENIOR ADMIT NOTE:                                                     Chief Complaint   Patient presents with   • Diarrhea     Transfer from [facility], diarrhea for one week, history of chronic alcohol intake   • Low Blood Sugar     Low blood sugar prior to arrival   • Low Back Pain     Chronic lower back pain     History of Present Illness:    Slava Lees is a 74 y.o. old patient who has not been eating or drinking anything for the past 2 weeks.  He has been having nausea, vomiting abdominal pain going to his back, and diarrhea for the past 2 weeks.  He also has chronic back pain.  Denies having any chest pain, shortness of breath, headache, or abdominal pain.  Denied noticing any blood in his vomit or his stool or denied noticing any black stool.  He thinks that he has been losing some weight for the past 2 weeks.  He has been drinking about 1 pint of vodka every day for several years and smokes 1 pack every 3 days for several years.  His last drink was about 2 weeks ago.    In the ED, the patient was stable. Vitals unremarkable. The patient will be admitted to the hospital for multiple problems mentioned below.      Weight/BMI: Body mass index is 17.63 kg/m².  /70   Pulse 94   Temp 37.4 °C (99.3 °F) (Temporal)   Resp 16   Ht 1.829 m (6')   Wt 59 kg (130 lb)   SpO2 96%     Assessment and Plan:  #Pancreatitis  #Gallbladder stones  #Fatty liver  #Mild to moderate mesenteric edema  #Acute kidney injury  #Macrocytic anemia  #Hypokalemia  #Anion gap metabolic acidosis  #Transaminitis with elevated bilirubin  #Hypoalbuminemia  #Elevated troponin  #Lactic acidosis  #Hypocalcemia    -His signs and symptoms are consistent with acute pancreatitis with elevated lipase.  We will continue lactated Ringer and pain management with morphine as needed.  He does have small volume high-density luminal debris sludge and small stones in the gallbladder fundus without wall thickening  more visualized calcified common duct stones.  Day team can consider consulting general surgery for possible cholecystectomy.  -Less likely to be withdrawal from his alcohol as his last drink was about 2 weeks ago.  Never had any withdrawal signs of seizure in his life.  -Continue IV fluids for SARAH and several other results.  We will continue D5NS as his glucose level is 65.  It was 21 at previous hospital which improved to 65 with 1 L of D5.  -We will order occult blood test, folate, vitamin B12 for his macrocytic anemia.  -Replete electrolytes as needed  -He had elevated bilirubin at previous hospital which is resolved on admission to this hospital.  Also, his AST trended down compared to previous hospital.  -We will trend his troponin and EKG.  -His lactate was 8.5 previous hospital patient is down to 5.9 and eventually 1.9 on presentation to this hospital.  We will continue IV fluids.  He may have alcoholic ketoacidosis from significant drinking and intractable nausea and vomiting for the past 2 weeks.  -Finally, his all cell lines including WBC, RBC and platelet are down probably secondary to hemodilution.  We will follow-up with repeat CBC tomorrow morning.  - Started him on TUMS 1000 mg TID and gave him calcium chloride 1 gram.     Please refer to Intern's H&P for complete admission details.    Carlo Douglas M.D.

## 2020-01-06 NOTE — ED NOTES
Patient awake alert and oriented x 4, Glascow 15, bed in low position, call light within reach, on room air, attached to cardiac monitor, unlabored breathing noted, no cough noted, interacts with staff, interactions noted as appropriate, watching television, IV fluids infusing.

## 2020-01-06 NOTE — ED NOTES
Patient resting in bed, sleeping, no signs of pain or distress, unlabored breathing noted, attached to cardiac monitor, bed in low position, call light within reach, on room air, frequent rounding performed, will continue to assess patient.

## 2020-01-06 NOTE — ED PROVIDER NOTES
ED Provider Note    Scribed for Sue Smalls M.D. by Marcos Fischer. 1/5/2020  8:44 PM    Means of arrival: EMS  History obtained from: Patient  History limited by: None      CHIEF COMPLAINT  Chief Complaint   Patient presents with   • Diarrhea     Transfer from [facility], diarrhea for one week, history of chronic alcohol intake   • Low Blood Sugar     Low blood sugar prior to arrival   • Low Back Pain     Chronic lower back pain       HPI  Slava Lees is a 74 y.o. male who presents to the Emergency Department as a transfer from [facility] for a higher level of care. Patient states that he was brought to [facility] earlier today by his family as he had been having nausea, vomiting and diarrhea for the last week. He denies any abdominal pain however does complain of lower back pain which is noted to be chronic. Otherwise his bowel movements are said to be normal aside from being diarrhea and he denies any hematochezia, melena or pain with passing bowel movements. Patient notes that he has been taking 6-8 OTC Advil 200 mg per day for management of his lower back pain. He also reports frequent alcohol intake but claims that his last drink was over 1 week ago and he denies any history of alcohol withdrawal seizures. He further denies any fevers, chills, chest pain or shortness of breath. Patient is not anticoagulated. He was also found to be hypoglycemic prior to arrival here at [facility].    REVIEW OF SYSTEMS  Pertinent positive include nausea, vomiting, diarrhea, lower back pain, chronic alcohol use, hypoglycemia. Pertinent negative include hematochezia, melena, withdrawal seizures, fevers, chills, chest pain, shortness of breath. All other systems reviewed and are negative.    PAST MEDICAL HISTORY  Chronic lower back pain, chronic alcohol use.    SOCIAL HISTORY  Social History     Tobacco Use   • Smoking status: None noted   Substance and Sexual Activity   • Alcohol use: Yes   • Drug use: None noted   •  Sexual activity: None noted       SURGICAL HISTORY  patient denies any surgical history    CURRENT MEDICATIONS  Home Medications     Reviewed by Pilar Pastor R.N. (Registered Nurse) on 20 at 0224  Med List Status: Complete   Medication Last Dose Status   ibuprofen (MOTRIN) 200 MG Tab 2020 Active                ALLERGIES  Allergies   Allergen Reactions   • Pollen Extract        PHYSICAL EXAM   VITAL SIGNS: /67   Pulse 94   Temp 37.4 °C (99.3 °F) (Temporal)   Resp 16   Ht 1.829 m (6')   Wt 59 kg (130 lb)   SpO2 98%   BMI 17.63 kg/m²    Constitutional: Cachectic elderly male, chronically ill appearing  HENT: Normocephalic, Atraumatic. Bilateral external ears normal. Nose normal.  Moist mucous membranes.  Oropharynx clear. Dry mucous membranes  Eyes: Pupils are equal and reactive. Conjunctiva normal.   Neck: Supple, full range of motion, no C spine tenderness  Heart: Regular rate and rhythm.  No murmurs.    Lungs: No respiratory distress, normal work of breathing. Lungs clear to auscultation bilaterally.  Abdomen Soft, no distention. Epigastric tenderness to palpation,  Musculoskeletal: Atraumatic. No obvious deformities noted.  No lower extremity edema. Back is atraumatic, no midline C, T, or L spine tenderness  Skin: Warm, Dry.  No erythema, No rash.   Neurologic: Alert and oriented x3. Moving all extremities spontaneously without focal deficits.  Psychiatric: Affect normal, Mood normal, Appears appropriate and not intoxicated.    DIAGNOSTIC STUDIES    EKG  Results for orders placed or performed during the hospital encounter of 20   EKG (Now)   Result Value Ref Range    Report       [facility].    Test Date:  2020  Pt Name:    JOSHUA ZEPEDA               Department: ER  MRN:        [id number]                      Room:       BL 13  Gender:     Male                         Technician: [id number]  :        1945                   Requested  By:SUE CHACON  Order #:    [id number]                    Reading MD: Sue Chacon MD    Measurements  Intervals                                Axis  Rate:       89                           P:          71  IL:         188                          QRS:        81  QRSD:       74                           T:          84  QT:         388  QTc:        473    Interpretive Statements  SINUS RHYTHM  BORDERLINE RIGHT AXIS DEVIATION  ABNRM R PROG, CONSIDER ASMI OR LEAD PLACEMENT  MINIMAL ST ELEVATION, DIFFUSE LEADS  T wave inversions anterior leads  No STEMI  No previous ECG available for comparison  Electronically Signed On 1-5-2020 22:12:22 PST by Sue Chacon MD         LABS  Personally reviewed by me  Labs Reviewed   CBC WITH DIFFERENTIAL - Abnormal; Notable for the following components:       Result Value    WBC 4.2 (*)     RBC 2.62 (*)     Hemoglobin 8.9 (*)     Hematocrit 26.6 (*)     .5 (*)     MCH 34.0 (*)     MCHC 33.5 (*)     Platelet Count 73 (*)     Lymphocytes 17.70 (*)     Lymphs (Absolute) 0.74 (*)     All other components within normal limits   COMP METABOLIC PANEL - Abnormal; Notable for the following components:    Potassium 3.5 (*)     Co2 18 (*)     Anion Gap 20.0 (*)     Bun 35 (*)     Creatinine 2.32 (*)     Calcium 7.6 (*)     AST(SGOT) 115 (*)     Albumin 3.0 (*)     Total Protein 5.6 (*)     All other components within normal limits   LIPASE - Abnormal; Notable for the following components:    Lipase 1958 (*)     All other components within normal limits   TROPONIN - Abnormal; Notable for the following components:    Troponin T 36 (*)     All other components within normal limits   URINALYSIS,CULTURE IF INDICATED - Abnormal; Notable for the following components:    Character Cloudy (*)     Ketones 15 (*)     Protein 100 (*)     Occult Blood Moderate (*)     All other components within normal limits   VENOUS BLOOD GAS - Abnormal; Notable for the following components:    Venous Bg Ph  7.29 (*)     Venous Bg Pco2 35.7 (*)     Venous Bg Hco3 17 (*)     All other components within normal limits   ESTIMATED GFR - Abnormal; Notable for the following components:    GFR If  33 (*)     GFR If Non  27 (*)     All other components within normal limits   DIFFERENTIAL MANUAL - Abnormal; Notable for the following components:    Bands-Stabs 23.90 (*)     All other components within normal limits   CBC WITH DIFFERENTIAL - Abnormal; Notable for the following components:    WBC 4.1 (*)     RBC 2.15 (*)     Hemoglobin 7.3 (*)     Hematocrit 22.1 (*)     .8 (*)     MCH 34.0 (*)     MCHC 33.0 (*)     RDW 50.3 (*)     Platelet Count 55 (*)     Lymphocytes 10.50 (*)     Lymphs (Absolute) 0.43 (*)     All other components within normal limits    Narrative:     Fasting   COMP METABOLIC PANEL - Abnormal; Notable for the following components:    Potassium 3.0 (*)     Co2 16 (*)     Anion Gap 16.0 (*)     Glucose 152 (*)     Bun 31 (*)     Creatinine 1.97 (*)     Calcium 6.1 (*)     AST(SGOT) 84 (*)     Albumin 2.3 (*)     Total Protein 4.3 (*)     All other components within normal limits    Narrative:     Fasting   LIPID PROFILE - Abnormal; Notable for the following components:    Cholesterol,Tot 80 (*)     Triglycerides 190 (*)     HDL 15 (*)     All other components within normal limits    Narrative:     Fasting   TROPONIN - Abnormal; Notable for the following components:    Troponin T 40 (*)     All other components within normal limits    Narrative:     Fasting   SALICYLATE - Abnormal; Notable for the following components:    Salicylates, Quant. 0 (*)     All other components within normal limits   MAGNESIUM - Abnormal; Notable for the following components:    Magnesium 1.1 (*)     All other components within normal limits    Narrative:     Fasting   ESTIMATED GFR - Abnormal; Notable for the following components:    GFR If  40 (*)     GFR If Non   33 (*)     All other components within normal limits    Narrative:     Fasting   DIFFERENTIAL MANUAL - Abnormal; Notable for the following components:    Bands-Stabs 14.90 (*)     All other components within normal limits    Narrative:     Fasting   URINE MICROSCOPIC (W/UA) - Abnormal; Notable for the following components:    WBC 0-2 (*)     RBC 0-2 (*)     Hyaline Cast 3-5 (*)     All other components within normal limits   LACTIC ACID   PERIPHERAL SMEAR REVIEW   PLATELET ESTIMATE   MORPHOLOGY   FOLATE    Narrative:     Fasting   VITAMIN B12    Narrative:     Fasting   HEPATITIS PANEL ACUTE(4 COMPONENTS)   ACETAMINOPHEN   URINE DRUG SCREEN   PERIPHERAL SMEAR REVIEW    Narrative:     Fasting   PLATELET ESTIMATE    Narrative:     Fasting   MORPHOLOGY    Narrative:     Fasting   OCCULT BLOOD X2 (STOOL)   BLOODLESS PROGRAM   ACCU-CHEK GLUCOSE       ED COURSE  Vitals:    01/05/20 2301 01/06/20 0001 01/06/20 0101 01/06/20 0200   BP: 110/70 104/70 111/65 116/70   Pulse: 94 97 90 90   Resp: 16 16 16 18   Temp:    37.4 °C (99.3 °F)   TempSrc:    Temporal   SpO2: 96% 94% 94% 98%   Weight:    54.8 kg (120 lb 13 oz)   Height:    1.829 m (6')         Medications administered:  D5NS    Patient was given IV fluids for dehydration and hypoglycemia.  IV hydration was used because oral hydration was not adequate alone.  Following fluid administration patient's hydration status and blood sugar were improved.      Old records personally reviewed:  Review of outside records showed: WBC 5.6, HGB 11.6. troponin negative, magnesium normal, creatinine 2.78, CO2 8, Glucose 21, Bilirubin 2.1, Lipase 1900, Ph 7.16, lactate 8.5, Ultrasound of abdomen showed gallstones without wall thickening or biliary duct dilation, CT abdomen showed no significant findings. Repeat lactic acid returned at 5.6 after receiving fluids. Prior to arrival the patient received fluids, vancomycin, zosyn and glucose.     8:44 PM Patient seen and examined at bedside.  The patient presents with nausea, vomiting and diarrhea. Ordered for CBC with differential, CMP, Lipase, Troponin, Lactic Acid, UA culture, Venous blood gas, Accu-Chek glucose, EKG to evaluate. Discussed with the patient that I will check his blood work and that he will need to be admitted to the hospital for continued observation and treatment. I will further discuss the plan of care once his blood work returns. Patient understands and agrees.    MEDICAL DECISION MAKING  Patient who is a chronic alcoholic who presented to outside hospital with essentially failure to thrive.  At the outside hospital, the patient had multiple lab abnormalities including hypoglycemia, acute renal failure, pancreatitis, and significant lactic acidosis.  On arrival here, he has reassuring vital signs and is overall nontoxic-appearing.  Repeat labs were performed showing evidence of improved acidosis and lactate.  Glucose remains stable.  No other significant electrolytes.  He does have increasing anemia which may be due to fluid depletion.  The patient is not complaining of any ongoing bleeding.      He does have an elevated lipase consistent with pancreatitis that is likely due to alcohol.  Imaging at the outside facility did show evidence of gallstones however bilirubin is normal without signs of obstructive process.  Imaging did not show any evidence of pancreatic pseudocyst or abscess or other acute abdominal pathology.  Patient will need admission for treatment of his pancreatitis and dehydration as well as monitoring for alcohol withdrawal.    10:10 PM - I spoke with MERA CORNEJO, who agrees to evaluate the patient for hospitalization.  I updated patient with plan of care and he is agreeable at this time.    CRITICAL CARE TIME  Upon my evaluation, this patient had a high probability of imminent or life-threatening deterioration due to hypoglycemia, acute renal failure, acute pancreatitis which required my direct attention, intervention,  and personal management.     I personally provided 36 minutes of total critical care time outside of time spent on separately billable/documented procedures. Time includes: review of laboratory data, review of radiology studies, discussion with consultants, discussion with family/patient, monitoring for potential decompensation.  Interventions were performed as documented above.         DISPOSITION:  Patient will be hospitalized by [facility] in guarded condition.    IMPRESSION  (K85.90) Acute pancreatitis without infection or necrosis, unspecified pancreatitis type  (N17.9) Acute renal failure, unspecified acute renal failure type (HCC)  (E16.2) Hypoglycemia  (E87.2) Alcoholic ketoacidosis  (K80.20) Calculus of gallbladder without cholecystitis without obstruction    I personally provided 36 minutes of total critical care time outside of time spent on separately billable/documented procedures.    Results, diagnoses, and treatment options were discussed with the patient and/or family. Patient verbalized understanding of plan of care.     Marcos WARD (Nicholasibe), am scribing for, and in the presence of, Sue Smalls M.D..    Electronically signed by: Marcos Fischer (Alison), 1/5/2020    Sue WARD M.D. personally performed the services described in this documentation, as scribed by Marcos Fischer in my presence, and it is both accurate and complete. C.    The note accurately reflects work and decisions made by me.  Sue Smalls  1/6/2020  4:37 AM

## 2020-01-06 NOTE — PROGRESS NOTES
Internal Medicine Interval Note  Note Author: Sanya Alejandre M.D.     Name Slava Lees 1945   Age/Sex 74 y.o. male   MRN [id number]   Code Status DNAR/DNI     After 5PM or if no immediate response to page, please call for cross-coverage  Attending/Team: Dr Pineda/Shyam See Patient List for primary contact information  Call [phone] to page    1st Call - Day Intern (R1):   Dr Alejandre 2nd Call - Day Sr. Resident (R2/R3):   Dr Russo         Reason for interval visit  (Principal Problem)   Acute pancreatitis, SARAH, metabolic acidosis with electrolyte imbalance      Interval Problem Daily Status Update  (24 hours, problem oriented, brief subjective history, new lab/imaging data pertinent to that problem)   A 74-year-old male with past medical history of chronic low back pain, alcohol abuse was transferred from [facility] for higher level of care.  Patient had epigastric pain, nausea, vomiting, diarrhea from 2 weeks with initial lab results showing hypoglycemia 53, elevated lipase , elevated lactic acid 8.5.ultrasound showing small gall stone without wall thickening or ductal dilatation and diffuse advanced liver disease or fatty liver.  CT abdomen without contrast as patient had SARAH showed mesenteric edema,luminal debris sludge and small stones in gall bladder fundus without wall thickening or visualized calcified common duct stones.    Subjective: Patient feeling better but still feels weak generalized especially in the lower limbs abdominal pain decreased 5/10 to 2/10 no nausea, vomiting and diarrhea.    Objective: Patient laying in bed comfortably in no distress but looks sick.  Abdominal examination tenderness positive over epigastric area and Anderson's sign positive.  Patient has normal deep tendon reflexes in the lower limbs.  Called [facility] for blood culture results said they are not ready yet. Will send them here if they are any abnormal.Will call  again for the results.    Patient says he did not see his primary care doctor Dr Sol in [place] from last 3 years    Discussed with Dr. Vela surgeon agreed for evaluation.    Review of Systems   Constitutional: Positive for weight loss. Negative for chills, diaphoresis, fever and malaise/fatigue.   HENT: Negative for ear discharge, ear pain, hearing loss, nosebleeds and tinnitus.    Eyes: Negative for blurred vision, double vision, photophobia, pain and discharge.   Respiratory: Negative for cough, hemoptysis, sputum production, shortness of breath and wheezing.    Cardiovascular: Negative for chest pain, palpitations, orthopnea, claudication and leg swelling.   Gastrointestinal: Positive for abdominal pain, diarrhea, heartburn, nausea and vomiting. Negative for blood in stool and melena.   Genitourinary: Negative for dysuria, flank pain, frequency, hematuria and urgency.   Musculoskeletal: Positive for back pain. Negative for falls, joint pain, myalgias and neck pain.   Skin: Negative for itching and rash.   Neurological: Positive for focal weakness and weakness. Negative for dizziness, tingling, tremors, sensory change, seizures, loss of consciousness and headaches.   Endo/Heme/Allergies: Negative for polydipsia. Does not bruise/bleed easily.   Psychiatric/Behavioral: Negative for depression, hallucinations and suicidal ideas.       Disposition/Barriers to discharge:   Acute pancreatitis    Consultants/Specialty  General surgery    PCP: No primary care provider on file.      Quality Measures  Quality-Core Measures   Reviewed items::  EKG reviewed, Labs reviewed, Medications reviewed and Radiology images reviewed  Orozco catheter::  No Orozco  DVT prophylaxis - mechanical:  SCDs  Antibiotics:  Treating active infection/contamination beyond 24 hours perioperative coverage          Physical Exam       Vitals:    01/06/20 0200 01/06/20 0444 01/06/20 0518 01/06/20 0715   BP: 116/70  115/69 105/69   Pulse: 90  99  86   Resp: 18 16 18 16   Temp: 37.4 °C (99.3 °F)  36.9 °C (98.4 °F) 37.1 °C (98.7 °F)   TempSrc: Temporal  Temporal Temporal   SpO2: 98%  95% 98%   Weight: 54.8 kg (120 lb 13 oz)      Height: 1.829 m (6')        Body mass index is 16.39 kg/m². Weight: 54.8 kg (120 lb 13 oz)  Oxygen Therapy:  Pulse Oximetry: 98 %, O2 (LPM): 0, O2 Delivery: None (Room Air)    Physical Exam   Constitutional: He is oriented to person, place, and time and well-developed, well-nourished, and in no distress. No distress.   HENT:   Head: Normocephalic and atraumatic.   Right Ear: External ear normal.   Left Ear: External ear normal.   Eyes: Pupils are equal, round, and reactive to light. Conjunctivae and EOM are normal. Right eye exhibits no discharge. Left eye exhibits no discharge. No scleral icterus.   Neck: Normal range of motion. Neck supple. No JVD present. No tracheal deviation present.   Cardiovascular: Normal rate, regular rhythm and normal heart sounds. Exam reveals no gallop.   No murmur heard.  Pulmonary/Chest: Effort normal and breath sounds normal. No respiratory distress. He has no wheezes. He has no rales. He exhibits no tenderness.   Abdominal: Soft. Bowel sounds are normal. He exhibits no distension. There is tenderness. There is no rebound and no guarding.   Tenderness positive over the epigastric area and Anderson sign positive.  No rebound or guarding bowel sounds are normal   Musculoskeletal: Normal range of motion.         General: No tenderness, deformity or edema.   Lymphadenopathy:     He has no cervical adenopathy.   Neurological: He is alert and oriented to person, place, and time. No cranial nerve deficit. Coordination normal. GCS score is 15.   Skin: Skin is warm and dry. No rash noted. He is not diaphoretic. No erythema. No pallor.             Assessment/Plan     * Acute pancreatitis  Assessment & Plan  74-year-old male admitted with history  of alcohol abuse and epigastric pain, nausea, vomiting, diarrhea from  2 weeks and with elevated lipase in the 1900s  CT abdomen without contrast from outside facility showed small volume high density sludge and small stone in gallbladder fundus without thickening, visualized calcified CBD stone  Blood cultures done at [facility] pending.  Called the hospital and they are not ready yet.  I will call again tomorrow  Got IV Zosyn and vancomycin at that hospital  Lactic 8.5-> 5.6->1.9  Anion gap of 20 on admission likely related to lactic acidosis/ starvation/ alcoholic   Plan  Continue IV fluid LR for SARAH  IV morphine for pain control  N.p.o.  Continue IV Zosyn  Occult blood test, folate, vitamin B12 for anemia  Replete electrolyte  For surgical consult informed Dr. Vela  For MRCP    Acute kidney injury (SARAH) with acute tubular necrosis (ATN) ([facility])  Assessment & Plan  BUN/Cr 65/2.32 on admission  Likely pre-renal due to poor oral intake and diarrhea  Patient's baseline creatinine is unknown.  Patient states he did not see his PCP Dr. Sol from last 3 years  Called the [facility] for records said they sent all the records want to have from admission.  Plan  Continue IV fluid  For bladder scan  Avoid nephrotoxic medication  Down trending, continue to monitor     Pancytopenia (HCC)  Assessment & Plan  Patient admitted for acute pancreatitis secondary to ?alcohol labs showed pancytopenia with hemoglobin 7.3 from 11.6 platelets 155 from 142 and WBC 4.1 from 4.2  Plan monitor labs closely  CBC with differential daily  For type and crossmatch  Transfuse if necessary    Lactic acidosis  Assessment & Plan  Per outside hospital record, lactic was elevated 8.5 -> 5.6 at outside hospital  On admission lactic was 1.9  Anion Gap of 20 on admission, like combination of lactic acidosis and starvation/alcoholic acidosis    Plan  Continue to fluid resuscitate  NPO    Cholelithiasis  Assessment & Plan  As seen on CT abdomen from outside hospital  Plan   for surgery consult for  possible cholecystectomy  Informed Dr. Vela surgeon  For MRCP  For possible GI consult    Electrolyte abnormality  Assessment & Plan  Hypocalcemia, hypomagnesemia, hypokalemia  resolved  Plan  Replete as needed      Elevated troponin  Assessment & Plan  36->40 on admission  Likely demand ischemia  No EKG changes    Plan  Fluid resuscitate  Continue to trend troponin  Echocardiogram

## 2020-01-06 NOTE — ED NOTES
Patient awake alert and oriented x 4, Glascow 15, bed in low position, call light within reach, on room air, attached to cardiac monitor, unlabored breathing noted, no cough noted, interacts with staff, interactions noted as appropriate, attempting to sleep at times, frequent rounding performed, will continue to assess patient.

## 2020-01-06 NOTE — ED NOTES
Pt denies taking prescription medications.  Med rec updated and complete. Allergies reviewed.     Home pharmacy [facility].

## 2020-01-06 NOTE — CONSULTS
CONSULT NOTE    PATIENT ID  Name:             Slava Lees     YOB: 1945  Age:                 74 y.o.  male   MRN:               [id number]    CHIEF COMPLAINT:        Epigastric pain    HISTORY OF PRESENT ILLNESS:  74-year-old male transferred from an outside hospital with acute pancreatitis.  He is known to have a history of alcohol abuse and he reports drinking 1 pint of vodka per day which she stopped when his abdominal pain started.  He reports significant epigastric pain that has not improved over the past 2 weeks.  He also reports nausea and vomiting as well as diarrhea.    He underwent a CT scan at [facility] which reported sludge and small stones in the gallbladder fundus without evidence of wall thickening as well as a calcified common bile duct stone.    REVIEW OF SYSTEMS:   Constitutional: Denies unintended weight change, night sweats, fatigue  Eyes:   Denies eye pain, redness, discharge, vision changes  Ears/Nose/Throat/Mouth: Denies hearing changes, ear pain, nasal congestion, sore throat, dysphagia  Cardiovascular:  Denies Chest pain, shortness of breath, orthopnea, palpitations, claudication  Respiratory:  Denies cough, sputum, wheezing, dyspnea  Gastrointestinal/Hepatic:  Denies dysphagia, melena, jaundice, hematochezia, changing heartburn  Genitourinary:  Denies dysuria, increased frequency, hematuria, urgency  Musculoskeletal/Rheum: Denies changing arthralgias, myalgias, joint swelling, joint stiffness  Skin/Breast: Denies changing skin lesions, pruritis, nipple discharge, hair changes  Neurological: Denies weakness, numbness, paresthesia, syncope, dizziness  Pyschiatric: Denies acute depression, anxiety, insomnia, personality changes, delusions  Endocrine: Denies temperature intolerance, polydipsia, polyuria  Heme/Oncology/Lymph Nodes: Denies easy bruising, bleeding, lymphadenopathy  All other systems were reviewed and are negative                 Past Medical  History:   Alcohol abuse    Past Surgical History:  History reviewed. No pertinent surgical history.    Current Outpatient Medications:  No current facility-administered medications on file prior to encounter.      Current Outpatient Medications on File Prior to Encounter   Medication Sig Dispense Refill   • ibuprofen (MOTRIN) 200 MG Tab Take 400 mg by mouth every 6 hours as needed for Mild Pain.         Current Inpatient Medications:   Current Facility-Administered Medications   Medication Last Dose   • calcium carbonate (TUMS) chewable tab 1,000 mg Stopped at 01/06/20 0130   • thiamine tablet 100 mg Stopped at 01/06/20 0830   • lactated ringers infusion     • [START ON 1/7/2020] multivitamin (THERAGRAN) tablet 1 Tab     • piperacillin-tazobactam (ZOSYN) 4.5 g in  mL IVPB     • tamsulosin (FLOMAX) capsule 0.4 mg     • pantoprazole (PROTONIX) injection 40 mg     • senna-docusate (PERICOLACE or SENOKOT S) 8.6-50 MG per tablet 2 Tab Stopped at 01/05/20 2345    And   • polyethylene glycol/lytes (MIRALAX) PACKET 1 Packet      And   • magnesium hydroxide (MILK OF MAGNESIA) suspension 30 mL      And   • bisacodyl (DULCOLAX) suppository 10 mg     • Respiratory Care per Protocol     • acetaminophen (TYLENOL) tablet 650 mg 650 mg at 01/06/20 0240   • nicotine (NICODERM) 21 MG/24HR 21 mg      And   • nicotine polacrilex (NICORETTE) 2 MG piece 2 mg     • morphine (pf) 4 MG/ML injection 1 mg 1 mg at 01/06/20 0323   • prochlorperazine (COMPAZINE) injection 10 mg         Medication Allergy/Sensitivities:  Allergies   Allergen Reactions   • Pollen Extract        Family History:  History reviewed. No pertinent family history.    Social History:  PCP: No primary care provider on file.  Social History     Tobacco Use   Smoking Status Current Every Day Smoker   • Packs/day: 0.33   • Start date: 1940   Smokeless Tobacco Never Used     Social History     Substance and Sexual Activity   Alcohol Use Yes   • Frequency: 4 or more  times a week   • Drinks per session: 10 or more   • Binge frequency: Daily or almost daily    Comment: 1 pint a day      Social History     Substance and Sexual Activity   Drug Use Never       PHYSICAL EXAM:  Weight/BMI: Body mass index is 16.39 kg/m².  Vitals:    01/06/20 0444 01/06/20 0518 01/06/20 0715 01/06/20 1225   BP:  115/69 105/69 100/67   Pulse:  99 86 84   Resp: 16 18 16 17   Temp:  36.9 °C (98.4 °F) 37.1 °C (98.7 °F) 36.9 °C (98.4 °F)   TempSrc:  Temporal Temporal Temporal   SpO2:  95% 98% 99%   Weight:       Height:         Oxygen Therapy:  Pulse Oximetry: 99 %, O2 (LPM): 0, O2 Delivery: None (Room Air)    Constitutional: Well developed, Well nourished, No acute distress, Non-toxic appearance.    HENMT: Normocephalic, Atraumatic, Bilateral external ears normal, Oropharynx moist mucous membranes, No oral exudates, Nose normal.  No thyromegaly.   Eyes: PERRLA, EOMI, Conjunctiva normal, No discharge.   Neck: Normal range of motion, No cervical tenderness, Supple, No stridor, no JVD.  Cardiovascular: Normal heart rate, Normal rhythm, No murmurs, No rubs, No gallops.   Extremites with intact distal pulses, no cyanosis, clubbing or edema.   Lungs:  Respiratory effort is normal. Normal breath sounds, breath sounds clear to auscultation bilaterally,  no rales, no rhonchi, no wheezing.   Abdomen: Soft, mild distention, significant epigastric pain, no peritonitis  Skin: Warm, Dry, No erythema, No rash, no induration or crepitus.        Neurologic: Alert & oriented x 3, Normal motor function, Normal sensory function, No focal deficits noted, cranial nerves II through XII are normal.  Psychiatric: Affect normal, Judgment normal, Mood normal.     LAB DATA REVIEWED:  Recent Results (from the past 24 hour(s))   ACCU-CHEK GLUCOSE    Collection Time: 01/05/20  8:48 PM   Result Value Ref Range    Glucose - Accu-Ck 69 65 - 99 mg/dL   CBC WITH DIFFERENTIAL    Collection Time: 01/05/20  9:05 PM   Result Value Ref Range     WBC 4.2 (L) 4.8 - 10.8 K/uL    RBC 2.62 (L) 4.70 - 6.10 M/uL    Hemoglobin 8.9 (L) 14.0 - 18.0 g/dL    Hematocrit 26.6 (L) 42.0 - 52.0 %    .5 (H) 81.4 - 97.8 fL    MCH 34.0 (H) 27.0 - 33.0 pg    MCHC 33.5 (L) 33.7 - 35.3 g/dL    RDW 50.0 35.9 - 50.0 fL    Platelet Count 73 (L) 164 - 446 K/uL    MPV 9.7 9.0 - 12.9 fL    Neutrophils-Polys 53.10 44.00 - 72.00 %    Lymphocytes 17.70 (L) 22.00 - 41.00 %    Monocytes 3.50 0.00 - 13.40 %    Eosinophils 0.00 0.00 - 6.90 %    Basophils 0.90 0.00 - 1.80 %    Nucleated RBC 0.00 /100 WBC    Neutrophils (Absolute) 3.23 1.82 - 7.42 K/uL    Lymphs (Absolute) 0.74 (L) 1.00 - 4.80 K/uL    Monos (Absolute) 0.15 0.00 - 0.85 K/uL    Eos (Absolute) 0.00 0.00 - 0.51 K/uL    Baso (Absolute) 0.04 0.00 - 0.12 K/uL    NRBC (Absolute) 0.00 K/uL    Anisocytosis 1+     Macrocytosis 1+    COMP METABOLIC PANEL    Collection Time: 01/05/20  9:05 PM   Result Value Ref Range    Sodium 140 135 - 145 mmol/L    Potassium 3.5 (L) 3.6 - 5.5 mmol/L    Chloride 102 96 - 112 mmol/L    Co2 18 (L) 20 - 33 mmol/L    Anion Gap 20.0 (H) 0.0 - 11.9    Glucose 65 65 - 99 mg/dL    Bun 35 (H) 8 - 22 mg/dL    Creatinine 2.32 (H) 0.50 - 1.40 mg/dL    Calcium 7.6 (L) 8.5 - 10.5 mg/dL    AST(SGOT) 115 (H) 12 - 45 U/L    ALT(SGPT) 49 2 - 50 U/L    Alkaline Phosphatase 75 30 - 99 U/L    Total Bilirubin 0.9 0.1 - 1.5 mg/dL    Albumin 3.0 (L) 3.2 - 4.9 g/dL    Total Protein 5.6 (L) 6.0 - 8.2 g/dL    Globulin 2.6 1.9 - 3.5 g/dL    A-G Ratio 1.2 g/dL   LIPASE    Collection Time: 01/05/20  9:05 PM   Result Value Ref Range    Lipase 1958 (H) 11 - 82 U/L   TROPONIN    Collection Time: 01/05/20  9:05 PM   Result Value Ref Range    Troponin T 36 (H) 6 - 19 ng/L   LACTIC ACID    Collection Time: 01/05/20  9:05 PM   Result Value Ref Range    Lactic Acid 1.9 0.5 - 2.0 mmol/L   ESTIMATED GFR    Collection Time: 01/05/20  9:05 PM   Result Value Ref Range    GFR If  33 (A) >60 mL/min/1.73 m 2    GFR If Non   27 (A) >60 mL/min/1.73 m 2   DIFFERENTIAL MANUAL    Collection Time: 20  9:05 PM   Result Value Ref Range    Bands-Stabs 23.90 (H) 0.00 - 10.00 %    Metamyelocytes 0.90 %    Manual Diff Status PERFORMED    PERIPHERAL SMEAR REVIEW    Collection Time: 20  9:05 PM   Result Value Ref Range    Peripheral Smear Review see below    PLATELET ESTIMATE    Collection Time: 20  9:05 PM   Result Value Ref Range    Plt Estimation Decreased    MORPHOLOGY    Collection Time: 20  9:05 PM   Result Value Ref Range    RBC Morphology Present     Smudge Cells Few    EKG (Now)    Collection Time: 20  9:16 PM   Result Value Ref Range    Report       [facility]    Test Date:  2020  Pt Name:    JOSHUA ZEPEDA               Department: ER  MRN:        [id number]                      Room:       [facility]  Gender:     Male                         Technician: [id number]  :        1945                   Requested By:SUE CHACON  Order #:    [id number]                    Reading MD: Sue Chacon MD    Measurements  Intervals                                Axis  Rate:       89                           P:          71  DC:         188                          QRS:        81  QRSD:       74                           T:          84  QT:         388  QTc:        473    Interpretive Statements  SINUS RHYTHM  BORDERLINE RIGHT AXIS DEVIATION  ABNRM R PROG, CONSIDER ASMI OR LEAD PLACEMENT  MINIMAL ST ELEVATION, DIFFUSE LEADS  T wave inversions anterior leads  No STEMI  No previous ECG available for comparison  Electronically Signed On 2020 22:12:22 PST by Sue Chacon MD     VENOUS BLOOD GAS    Collection Time: 20  9:19 PM   Result Value Ref Range    Venous Bg Ph 7.29 (L) 7.31 - 7.45    Venous Bg Pco2 35.7 (L) 41.0 - 51.0 mmHg    Venous Bg Po2 28.7 25.0 - 40.0 mmHg    Venous Bg O2 Saturation 48.2 %    Venous Bg Hco3 17 (L) 24 - 28 mmol/L    Venous Bg  Base Excess -9 mmol/L    Body Temp see below Centigrade   CBC with Differential    Collection Time: 01/06/20 12:10 AM   Result Value Ref Range    WBC 4.1 (L) 4.8 - 10.8 K/uL    RBC 2.15 (L) 4.70 - 6.10 M/uL    Hemoglobin 7.3 (L) 14.0 - 18.0 g/dL    Hematocrit 22.1 (L) 42.0 - 52.0 %    .8 (H) 81.4 - 97.8 fL    MCH 34.0 (H) 27.0 - 33.0 pg    MCHC 33.0 (L) 33.7 - 35.3 g/dL    RDW 50.3 (H) 35.9 - 50.0 fL    Platelet Count 55 (L) 164 - 446 K/uL    MPV 9.8 9.0 - 12.9 fL    Neutrophils-Polys 70.20 44.00 - 72.00 %    Lymphocytes 10.50 (L) 22.00 - 41.00 %    Monocytes 3.50 0.00 - 13.40 %    Eosinophils 0.00 0.00 - 6.90 %    Basophils 0.00 0.00 - 1.80 %    Nucleated RBC 0.00 /100 WBC    Neutrophils (Absolute) 3.49 1.82 - 7.42 K/uL    Lymphs (Absolute) 0.43 (L) 1.00 - 4.80 K/uL    Monos (Absolute) 0.14 0.00 - 0.85 K/uL    Eos (Absolute) 0.00 0.00 - 0.51 K/uL    Baso (Absolute) 0.00 0.00 - 0.12 K/uL    NRBC (Absolute) 0.00 K/uL   Comp Metabolic Panel (CMP)    Collection Time: 01/06/20 12:10 AM   Result Value Ref Range    Sodium 142 135 - 145 mmol/L    Potassium 3.0 (L) 3.6 - 5.5 mmol/L    Chloride 110 96 - 112 mmol/L    Co2 16 (L) 20 - 33 mmol/L    Anion Gap 16.0 (H) 0.0 - 11.9    Glucose 152 (H) 65 - 99 mg/dL    Bun 31 (H) 8 - 22 mg/dL    Creatinine 1.97 (H) 0.50 - 1.40 mg/dL    Calcium 6.1 (LL) 8.5 - 10.5 mg/dL    AST(SGOT) 84 (H) 12 - 45 U/L    ALT(SGPT) 37 2 - 50 U/L    Alkaline Phosphatase 55 30 - 99 U/L    Total Bilirubin 0.6 0.1 - 1.5 mg/dL    Albumin 2.3 (L) 3.2 - 4.9 g/dL    Total Protein 4.3 (L) 6.0 - 8.2 g/dL    Globulin 2.0 1.9 - 3.5 g/dL    A-G Ratio 1.2 g/dL   Lipid Profile (Lipid Panel) Fasting    Collection Time: 01/06/20 12:10 AM   Result Value Ref Range    Cholesterol,Tot 80 (L) 100 - 199 mg/dL    Triglycerides 190 (H) 0 - 149 mg/dL    HDL 15 (A) >=40 mg/dL    LDL 27 <100 mg/dL   FOLATE    Collection Time: 01/06/20 12:10 AM   Result Value Ref Range    Folate -Folic Acid 23.7 >4.0 ng/mL   TROPONIN     Collection Time: 01/06/20 12:10 AM   Result Value Ref Range    Troponin T 40 (H) 6 - 19 ng/L   VITAMIN B12    Collection Time: 01/06/20 12:10 AM   Result Value Ref Range    Vitamin B12 -True Cobalamin 279 211 - 911 pg/mL   MAGNESIUM    Collection Time: 01/06/20 12:10 AM   Result Value Ref Range    Magnesium 1.1 (L) 1.5 - 2.5 mg/dL   ESTIMATED GFR    Collection Time: 01/06/20 12:10 AM   Result Value Ref Range    GFR If  40 (A) >60 mL/min/1.73 m 2    GFR If Non  33 (A) >60 mL/min/1.73 m 2   DIFFERENTIAL MANUAL    Collection Time: 01/06/20 12:10 AM   Result Value Ref Range    Bands-Stabs 14.90 (H) 0.00 - 10.00 %    Metamyelocytes 0.90 %    Manual Diff Status PERFORMED    PERIPHERAL SMEAR REVIEW    Collection Time: 01/06/20 12:10 AM   Result Value Ref Range    Peripheral Smear Review see below    PLATELET ESTIMATE    Collection Time: 01/06/20 12:10 AM   Result Value Ref Range    Plt Estimation Decreased    MORPHOLOGY    Collection Time: 01/06/20 12:10 AM   Result Value Ref Range    RBC Morphology Normal    Salicylate    Collection Time: 01/06/20  1:50 AM   Result Value Ref Range    Salicylates, Quant. 0 (L) 15 - 25 mg/dL   ACETAMINOPHEN    Collection Time: 01/06/20  1:50 AM   Result Value Ref Range    Acetaminophen -Tylenol <10 10 - 30 ug/mL   URINALYSIS CULTURE, IF INDICATED    Collection Time: 01/06/20  3:58 AM   Result Value Ref Range    Color DK Yellow     Character Cloudy (A)     Specific Gravity 1.019 <1.035    Ph 5.5 5.0 - 8.0    Glucose Negative Negative mg/dL    Ketones 15 (A) Negative mg/dL    Protein 100 (A) Negative mg/dL    Bilirubin Negative Negative    Urobilinogen, Urine 1.0 Negative    Nitrite Negative Negative    Leukocyte Esterase Negative Negative    Occult Blood Moderate (A) Negative    Micro Urine Req Microscopic    URINE DRUG SCREEN    Collection Time: 01/06/20  3:58 AM   Result Value Ref Range    Amphetamines Urine Negative Negative    Barbiturates Negative  Negative    Benzodiazepines Negative Negative    Cocaine Metabolite Negative Negative    Methadone Negative Negative    Opiates Positive (A) Negative    Oxycodone Negative Negative    Phencyclidine -Pcp Negative Negative    Propoxyphene Negative Negative    Cannabinoid Metab Negative Negative   URINE MICROSCOPIC (W/UA)    Collection Time: 01/06/20  3:58 AM   Result Value Ref Range    WBC 0-2 (A) /hpf    RBC 0-2 (A) /hpf    Bacteria Negative None /hpf    Epithelial Cells Few /hpf    Hyaline Cast 3-5 (A) /lpf   Basic Metabolic Panel    Collection Time: 01/06/20  8:16 AM   Result Value Ref Range    Sodium 137 135 - 145 mmol/L    Potassium 4.2 3.6 - 5.5 mmol/L    Chloride 105 96 - 112 mmol/L    Co2 15 (L) 20 - 33 mmol/L    Glucose 101 (H) 65 - 99 mg/dL    Bun 36 (H) 8 - 22 mg/dL    Creatinine 2.14 (H) 0.50 - 1.40 mg/dL    Calcium 8.0 (L) 8.5 - 10.5 mg/dL    Anion Gap 17.0 (H) 0.0 - 11.9   MAGNESIUM    Collection Time: 01/06/20  8:16 AM   Result Value Ref Range    Magnesium 2.4 1.5 - 2.5 mg/dL   ESTIMATED GFR    Collection Time: 01/06/20  8:16 AM   Result Value Ref Range    GFR If  36 (A) >60 mL/min/1.73 m 2    GFR If Non African American 30 (A) >60 mL/min/1.73 m 2   IRON/TOTAL IRON BIND    Collection Time: 01/06/20 12:07 PM   Result Value Ref Range    Iron 13 (L) 50 - 180 ug/dL    Total Iron Binding 178 (L) 250 - 450 ug/dL    % Saturation 7 (L) 15 - 55 %   TRANSFERRIN    Collection Time: 01/06/20 12:07 PM   Result Value Ref Range    Transferrin 129 (L) 200 - 370 mg/dL   FERRITIN    Collection Time: 01/06/20 12:07 PM   Result Value Ref Range    Ferritin 245.5 22.0 - 322.0 ng/mL   TSH WITH REFLEX TO FT4    Collection Time: 01/06/20 12:07 PM   Result Value Ref Range    TSH 3.960 0.380 - 5.330 uIU/mL   PHOSPHORUS    Collection Time: 01/06/20 12:07 PM   Result Value Ref Range    Phosphorus 2.5 2.5 - 4.5 mg/dL   PROCALCITONIN    Collection Time: 01/06/20  2:13 PM   Result Value Ref Range    Procalcitonin 1.15  (H) <0.25 ng/mL       IMAGING:   Images Independently Reviewed   EC-ECHOCARDIOGRAM COMPLETE W/O CONT         OUTSIDE IMAGES-CT ABDOMEN /PELVIS   Final Result      OUTSIDE IMAGES-US ABDOMEN   Final Result      OUTSIDE IMAGES-DX CHEST   Final Result      [id number]    (Results Pending)       ASSESSMENT/PLAN     74-year-old male with recent history of alcohol abuse, and current smoker, presenting with acute pancreatitis possibly secondary to alcohol abuse versus gallstone.    The possible diagnosis of gallstone pancreatitis was discussed with the patient.  I also discussed the rationale for a laparoscopic cholecystectomy, possible open, prior to discharge to avoid recurrence.    The non-operative and operative options were discussed with the patient.  This included a discussion of the surgery and it's associated recovery and restrictions.  The associated risks and complications were also discussed, which include but are not limited to, bleeding, infection, injury to surrounding organs, bile leak, bile duct injury, hernia, recurrence, requirements for additional procedures or surgeries, cardiopulmonary complications, thromboembolic complications, death, etc.  The patient understood and will consider surgery.    Continue with conservative management and fluid rehydration for acute pancreatitis.    MRCP pending.  May require ERCP prior to cholecystectomy if common bile duct stones are still present.      Karla Vela MD  General Surgery  Colon and Rectal Surgery  [facility]

## 2020-01-06 NOTE — ED NOTES
David from Lab called with critical result of bands greater than 10%. Critical lab result read back to David.   Dr. Smalls notified of critical lab result at 2148.  Critical lab result read back by Dr. Smalls.

## 2020-01-06 NOTE — ASSESSMENT & PLAN NOTE
Per outside hospital record, lactic was elevated 8.5 -> 5.6 at outside hospital  On admission lactic was 1.9  resolved

## 2020-01-06 NOTE — CARE PLAN
Problem: Communication  Goal: The ability to communicate needs accurately and effectively will improve  Outcome: PROGRESSING AS EXPECTED   Pt and family updated on imaging and medications prescribed.   Problem: Safety  Goal: Will remain free from injury  Outcome: PROGRESSING AS EXPECTED   Pt educated to call before getting up out of bed fall precautions are in place.   Problem: Infection  Goal: Will remain free from infection  Outcome: PROGRESSING AS EXPECTED   Pt receiving IV abx for infection monitoring VS

## 2020-01-06 NOTE — PROGRESS NOTES
Received report from Pilar, at pt bedside. Pt is blood less, pt A/O x 4, NPO due to failed bedside swallow eval, POC discussed. Call light and belongings within reach. Bed locked and in low position. Alarm on and fall precautions in place.

## 2020-01-06 NOTE — ED NOTES
Patient resting in bed, sleeping, no signs of pain or distress, unlabored breathing noted, attached to cardiac monitor, bed in low position, call light within reach, IV fluids infusing, awaiting inpatient unit's arrival to transport to receiving unit.

## 2020-01-06 NOTE — H&P
Internal Medicine Admitting History and Physical    Note Author: Vineet Mcgregor M.D.       Name Slava Lees DOB 1945   Age/Sex 74 y.o. male   MRN [id number]   Code Status DNAR/DNI     After 5PM or if no immediate response to page, please call for cross-coverage  Attending/Team: Dr. Pineda / Shyam See Patient List for primary contact information  Call [phone] to page    1st Call - Day Intern (R1):   Dr. Alejandre 2nd Call - Day Sr. Resident (R2/R3):   Dr. Russo       Chief Complaint:   Abdominal pain/ diarrhea/ nausea/ vomiting x 2 week    HPI:  Treat Yoana is a 75 yo gentleman with history of alcohol abuse was transferred from [facility] to [facility] for higher level care for N/V/D and abdominal pain.     Patient was in usual state of health until 2 weeks ago when he began to have epigastric abdominal pain and symptoms of nausea, vomiting and non-bloody diarrhea. He denies chest pain, shortness of breath, fever, cough, urinary changes. Patient does have chronic lower back pain for which he takes 200mg advil per day. He has history of alcohol use and reports he drinks at least 1 pint vodka per day, last drink was 2 weeks ago, and no hx of alcohol withdraw/seizure.     Lab review from [facility], showed T bili 2.1, hypoglycemia 53, ammonia 41, lipase , lactic 8.5-> 5.6 ( 1.9 at [facility]), plt 142, Hgb 11.6. His vitals were unremarkable in ED. Labs in [facility] showed lactic 1.9, no leukocytosis but with bandemia, potassium, hypoglycemia 65, elevated ast 115, lipase 1958, troponin 36-> 40.       Review of Systems   Constitutional: Negative for chills and fever.   HENT: Negative for hearing loss and tinnitus.    Eyes: Negative for blurred vision and double vision.   Respiratory: Negative for cough, hemoptysis and shortness of breath.    Cardiovascular: Negative for chest pain and palpitations.   Gastrointestinal: Positive for abdominal pain, diarrhea, nausea and vomiting.  Negative for blood in stool, heartburn and melena.   Genitourinary: Negative for dysuria and urgency.   Musculoskeletal: Positive for back pain. Negative for myalgias.   Skin: Negative for itching and rash.   Neurological: Negative for dizziness and headaches.   Psychiatric/Behavioral: Negative for depression and suicidal ideas.             Past Medical History (Chronic medical problem, known complications and current treatment)    Hx alcohol use    Past Surgical History:  No past surgical history on file.    Current Outpatient Medications:  Home Medications     Reviewed by Nando Alonzo (Pharmacy Tech) on 20 at 2227  Med List Status: Complete   Medication Last Dose Status   ibuprofen (MOTRIN) 200 MG Tab 2020 Active                Medication Allergy/Sensitivities:  Allergies   Allergen Reactions   • Pollen Extract          Family History (mandatory)   No family history on file.   Mother  hodgkin lymphoma  Father Lung cancer    Social History (mandatory)   Social History     Socioeconomic History   • Marital status:      Spouse name: Not on file   • Number of children: Not on file   • Years of education: Not on file   • Highest education level: Not on file   Occupational History   • Not on file   Social Needs   • Financial resource strain: Not on file   • Food insecurity:     Worry: Not on file     Inability: Not on file   • Transportation needs:     Medical: Not on file     Non-medical: Not on file   Tobacco Use   • Smoking status: Not on file   Substance and Sexual Activity   • Alcohol use: Not on file   • Drug use: Not on file   • Sexual activity: Not on file   Lifestyle   • Physical activity:     Days per week: Not on file     Minutes per session: Not on file   • Stress: Not on file   Relationships   • Social connections:     Talks on phone: Not on file     Gets together: Not on file     Attends [employer] service: Not on file     Active member of club or organization: Not on file      Attends meetings of clubs or organizations: Not on file     Relationship status: Not on file   • Intimate partner violence:     Fear of current or ex partner: Not on file     Emotionally abused: Not on file     Physically abused: Not on file     Forced sexual activity: Not on file   Other Topics Concern   • Not on file   Social History Narrative   • Not on file     Social history:   smokes 1pack / 3 days  Alcohol: 1 pint vodka per day  Denies other drugs          Living situation: Lives in [place], house, by self  PCP : No primary care provider on file.    Physical Exam     Vitals:    01/05/20 2201 01/05/20 2301 01/06/20 0001 01/06/20 0101   BP: 107/68 110/70 104/70 111/65   Pulse: 88 94 97 90   Resp: 16 16 16 16   Temp:       TempSrc:       SpO2: 97% 96% 94% 94%   Weight:       Height:         Body mass index is 17.63 kg/m².  O2 therapy: Pulse Oximetry: 94 %, O2 Delivery: None (Room Air)    Physical Exam   Constitutional: He is well-developed, well-nourished, and in no distress.   HENT:   Head: Normocephalic and atraumatic.   Eyes: Pupils are equal, round, and reactive to light. EOM are normal. No scleral icterus.   Neck: Normal range of motion. Neck supple. No thyromegaly present.   Cardiovascular: Normal rate, regular rhythm and normal heart sounds.   No murmur heard.  Pulmonary/Chest: Effort normal and breath sounds normal. No respiratory distress.   Abdominal: Soft. Bowel sounds are normal. He exhibits no distension. There is tenderness.   epigastric   Musculoskeletal: Normal range of motion.         General: No tenderness, deformity or edema.   Skin: Skin is warm and dry. No rash noted. No erythema.   Psychiatric: Affect and judgment normal.         Data Review         Lab Data Review:  Recent Results (from the past 24 hour(s))   ACCU-CHEK GLUCOSE    Collection Time: 01/05/20  8:48 PM   Result Value Ref Range    Glucose - Accu-Ck 69 65 - 99 mg/dL   CBC WITH DIFFERENTIAL    Collection Time: 01/05/20  9:05 PM    Result Value Ref Range    WBC 4.2 (L) 4.8 - 10.8 K/uL    RBC 2.62 (L) 4.70 - 6.10 M/uL    Hemoglobin 8.9 (L) 14.0 - 18.0 g/dL    Hematocrit 26.6 (L) 42.0 - 52.0 %    .5 (H) 81.4 - 97.8 fL    MCH 34.0 (H) 27.0 - 33.0 pg    MCHC 33.5 (L) 33.7 - 35.3 g/dL    RDW 50.0 35.9 - 50.0 fL    Platelet Count 73 (L) 164 - 446 K/uL    MPV 9.7 9.0 - 12.9 fL    Neutrophils-Polys 53.10 44.00 - 72.00 %    Lymphocytes 17.70 (L) 22.00 - 41.00 %    Monocytes 3.50 0.00 - 13.40 %    Eosinophils 0.00 0.00 - 6.90 %    Basophils 0.90 0.00 - 1.80 %    Nucleated RBC 0.00 /100 WBC    Neutrophils (Absolute) 3.23 1.82 - 7.42 K/uL    Lymphs (Absolute) 0.74 (L) 1.00 - 4.80 K/uL    Monos (Absolute) 0.15 0.00 - 0.85 K/uL    Eos (Absolute) 0.00 0.00 - 0.51 K/uL    Baso (Absolute) 0.04 0.00 - 0.12 K/uL    NRBC (Absolute) 0.00 K/uL    Anisocytosis 1+     Macrocytosis 1+    COMP METABOLIC PANEL    Collection Time: 01/05/20  9:05 PM   Result Value Ref Range    Sodium 140 135 - 145 mmol/L    Potassium 3.5 (L) 3.6 - 5.5 mmol/L    Chloride 102 96 - 112 mmol/L    Co2 18 (L) 20 - 33 mmol/L    Anion Gap 20.0 (H) 0.0 - 11.9    Glucose 65 65 - 99 mg/dL    Bun 35 (H) 8 - 22 mg/dL    Creatinine 2.32 (H) 0.50 - 1.40 mg/dL    Calcium 7.6 (L) 8.5 - 10.5 mg/dL    AST(SGOT) 115 (H) 12 - 45 U/L    ALT(SGPT) 49 2 - 50 U/L    Alkaline Phosphatase 75 30 - 99 U/L    Total Bilirubin 0.9 0.1 - 1.5 mg/dL    Albumin 3.0 (L) 3.2 - 4.9 g/dL    Total Protein 5.6 (L) 6.0 - 8.2 g/dL    Globulin 2.6 1.9 - 3.5 g/dL    A-G Ratio 1.2 g/dL   LIPASE    Collection Time: 01/05/20  9:05 PM   Result Value Ref Range    Lipase 1958 (H) 11 - 82 U/L   TROPONIN    Collection Time: 01/05/20  9:05 PM   Result Value Ref Range    Troponin T 36 (H) 6 - 19 ng/L   LACTIC ACID    Collection Time: 01/05/20  9:05 PM   Result Value Ref Range    Lactic Acid 1.9 0.5 - 2.0 mmol/L   ESTIMATED GFR    Collection Time: 01/05/20  9:05 PM   Result Value Ref Range    GFR If  33 (A) >60  mL/min/1.73 m 2    GFR If Non  27 (A) >60 mL/min/1.73 m 2   DIFFERENTIAL MANUAL    Collection Time: 20  9:05 PM   Result Value Ref Range    Bands-Stabs 23.90 (H) 0.00 - 10.00 %    Metamyelocytes 0.90 %    Manual Diff Status PERFORMED    PERIPHERAL SMEAR REVIEW    Collection Time: 20  9:05 PM   Result Value Ref Range    Peripheral Smear Review see below    PLATELET ESTIMATE    Collection Time: 20  9:05 PM   Result Value Ref Range    Plt Estimation Decreased    MORPHOLOGY    Collection Time: 20  9:05 PM   Result Value Ref Range    RBC Morphology Present     Smudge Cells Few    EKG (Now)    Collection Time: 20  9:16 PM   Result Value Ref Range    Report       [facility] Emergency Dept.    Test Date:  2020  Pt Name:    JOSHUA ZEPEDA               Department: ER  MRN:        [id number]                      Room:       [facility]  Gender:     Male                         Technician: [id number]  :        1945                   Requested By:SUE CHACON  Order #:    [id number]                    Reading MD: Sue Chacon MD    Measurements  Intervals                                Axis  Rate:       89                           P:          71  MO:         188                          QRS:        81  QRSD:       74                           T:          84  QT:         388  QTc:        473    Interpretive Statements  SINUS RHYTHM  BORDERLINE RIGHT AXIS DEVIATION  ABNRM R PROG, CONSIDER ASMI OR LEAD PLACEMENT  MINIMAL ST ELEVATION, DIFFUSE LEADS  T wave inversions anterior leads  No STEMI  No previous ECG available for comparison  Electronically Signed On 2020 22:12:22 PST by Sue Chacon MD     VENOUS BLOOD GAS    Collection Time: 20  9:19 PM   Result Value Ref Range    Venous Bg Ph 7.29 (L) 7.31 - 7.45    Venous Bg Pco2 35.7 (L) 41.0 - 51.0 mmHg    Venous Bg Po2 28.7 25.0 - 40.0 mmHg    Venous Bg O2 Saturation 48.2 %    Venous Bg Hco3 17 (L)  24 - 28 mmol/L    Venous Bg Base Excess -9 mmol/L    Body Temp see below Centigrade   Comp Metabolic Panel (CMP)    Collection Time: 01/06/20 12:10 AM   Result Value Ref Range    Sodium 142 135 - 145 mmol/L    Potassium 3.0 (L) 3.6 - 5.5 mmol/L    Chloride 110 96 - 112 mmol/L    Co2 16 (L) 20 - 33 mmol/L    Anion Gap 16.0 (H) 0.0 - 11.9    Glucose 152 (H) 65 - 99 mg/dL    Bun 31 (H) 8 - 22 mg/dL    Creatinine 1.97 (H) 0.50 - 1.40 mg/dL    Calcium 6.1 (LL) 8.5 - 10.5 mg/dL    AST(SGOT) 84 (H) 12 - 45 U/L    ALT(SGPT) 37 2 - 50 U/L    Alkaline Phosphatase 55 30 - 99 U/L    Total Bilirubin 0.6 0.1 - 1.5 mg/dL    Albumin 2.3 (L) 3.2 - 4.9 g/dL    Total Protein 4.3 (L) 6.0 - 8.2 g/dL    Globulin 2.0 1.9 - 3.5 g/dL    A-G Ratio 1.2 g/dL   Lipid Profile (Lipid Panel) Fasting    Collection Time: 01/06/20 12:10 AM   Result Value Ref Range    Cholesterol,Tot 80 (L) 100 - 199 mg/dL    Triglycerides 190 (H) 0 - 149 mg/dL    HDL 15 (A) >=40 mg/dL    LDL 27 <100 mg/dL   TROPONIN    Collection Time: 01/06/20 12:10 AM   Result Value Ref Range    Troponin T 40 (H) 6 - 19 ng/L   MAGNESIUM    Collection Time: 01/06/20 12:10 AM   Result Value Ref Range    Magnesium 1.1 (L) 1.5 - 2.5 mg/dL   ESTIMATED GFR    Collection Time: 01/06/20 12:10 AM   Result Value Ref Range    GFR If  40 (A) >60 mL/min/1.73 m 2    GFR If Non  33 (A) >60 mL/min/1.73 m 2       Imaging/Procedures Review:    Independant Imaging Review: Completed  OUTSIDE IMAGES-CT ABDOMEN /PELVIS   Final Result      OUTSIDE IMAGES-US ABDOMEN   Final Result      OUTSIDE IMAGES-DX CHEST   Final Result             EKG:   EKG Independent Review: Completed  QTc:471, HR: 89, Normal Sinus Rhythm, no ST/T changes              Assessment/Plan     * Acute pancreatitis  Assessment & Plan  Signs and symptoms consistent with acute pancreatitis with lipase elevated 1900s  CT abdomen from outside facility showed small volume high density sludge and small stone in  gallbladder fundus without thickening, visualized calcified CBD stone  Received D5NS in ED for hypoglycemia, corrected   T bili resolved from 8.1 at outside hospital to 0.6  Lactic 8.5-> 5.6->1.9  Anion gap of 20 on admission likely related to lactic acidosis/ starvation/ alcoholic   Plan  Start IV fluid LR for SARAH  IV morphine for pain control  Advance diet as tolerated  Occult blood test, folate, vitamin B12 for anemia  Replete electrolyte        Electrolyte abnormality  Assessment & Plan  Hypocalcemia, hypomagnesemia, hypokalemia  Plan  Replete as needed      Hypocalcemia  Assessment & Plan  Replace with calcium gluconate  Likely related to pancreatitis  Plan  Will replete magnesium  Will order phosphorus lab    Lactic acidosis  Assessment & Plan  Per outside hospital record, lactic was elevated 8.5 -> 5.6 at outside hospital  On admission lactic was 1.9  Anion Gap of 20 on admission, like combination of lactic acidosis and starvation/alcoholic acidosis    Plan  Continue to fluid resuscitate  Advance diet as tolerate        Elevated troponin  Assessment & Plan  36->40 on admission  Likely demand ischemia  No EKG changes    Plan  Fluid resuscitate  Continue to trend troponin    Acute kidney injury (SARAH) with acute tubular necrosis (ATN) (HCC)  Assessment & Plan  BUN/Cr 65/2.32 on admission  Likely pre-renal due to poor oral intake and diarrhea    Plan  Continue IV fluid  Down trending, continue to monitor     Cholelithiasis  Assessment & Plan  As seen on CT abdomen from outside hospital  Plan  Day team to consider need for surgery consult for possible cholecystectomy        Anticipated Hospital stay:  >2 midnights        Quality Measures  Quality-Core Measures   Reviewed items::  EKG reviewed, Labs reviewed, Medications reviewed and Radiology images reviewed  Orozco catheter::  No Orozco  DVT prophylaxis pharmacological::  Heparin    PCP: No primary care provider on file.

## 2020-01-06 NOTE — PROGRESS NOTES
Pt Slava Lees admitted to room T834-2 via transport in [facility] from ER at 0200.  Pt is A&Ox4.  No family is at bedside. VSS.  C/O chronic back pain.   Will give pain meds per orders.  Pt states he has not been able to get OOB d/t weakness and being sick.  Staff slide pt over to bed from [facility].  Pt will be a two assist when OOB, pt states prior to getting sick he was up self no use of ambulatory aids.  Buttocks is reddened slow to henrry.  Pt does reposition himself frequently d/t chronic back pain.  Educated pt on changing position Q 2 hours to prevent pressure ulcers.  Pt verbalizes understanding.  Calls appropriately.  C/O of nausea but has not vomited or dry heaved.  Oriented to room call light and smoking policy.  Reviewed plan of care with patient.  Telemonitor in place. Uses urinal.  Call light within reach and working properly.

## 2020-01-06 NOTE — ASSESSMENT & PLAN NOTE
74-year-old male admitted with history  of alcohol abuse and epigastric pain, nausea, vomiting, diarrhea from 2 weeks and with elevated lipase in the 1900 on admission  CT abdomen without contrast from outside facility showed small volume high density sludge and small stone in gallbladder fundus without thickening, visualized calcified CBD stone  Blood cultures done at [facility] pending.  Called the hospital and they are not ready yet.  I will call again tomorrow  MRI showed acute pancreatitis, cholelithiasis with biliary dilatation without CBD stone, diffuse hepatocellular disease?  Cirrhosis and small amount of ascites  2/2 ETOH abouse  No ABD pain today  To discharge to [facility]

## 2020-01-06 NOTE — ASSESSMENT & PLAN NOTE
As seen on CT abdomen from outside hospital  MRI showed acute pancreatitis, cholelithiasis with biliary dilatation without CBD stone, diffuse hepatocellular disease?  Cirrhosis and small amount of ascites.  As per Surg, Hold off on surgery for now, pt needs to get stronger

## 2020-01-07 ENCOUNTER — APPOINTMENT (OUTPATIENT)
Dept: RADIOLOGY | Facility: MEDICAL CENTER | Age: 75
DRG: 439 | End: 2020-01-07
Attending: INTERNAL MEDICINE
Payer: MEDICARE

## 2020-01-07 PROBLEM — F10.10 ALCOHOL ABUSE: Status: ACTIVE | Noted: 2020-01-07

## 2020-01-07 LAB
AFP-TM SERPL-MCNC: 2 NG/ML (ref 0–9)
ALBUMIN SERPL BCP-MCNC: 2.5 G/DL (ref 3.2–4.9)
ALBUMIN/GLOB SERPL: 1 G/DL
ALP SERPL-CCNC: 60 U/L (ref 30–99)
ALT SERPL-CCNC: 39 U/L (ref 2–50)
ANION GAP SERPL CALC-SCNC: 12 MMOL/L (ref 0–11.9)
AST SERPL-CCNC: 74 U/L (ref 12–45)
BASOPHILS # BLD AUTO: 0 % (ref 0–1.8)
BASOPHILS # BLD: 0 K/UL (ref 0–0.12)
BILIRUB SERPL-MCNC: 0.6 MG/DL (ref 0.1–1.5)
BUN SERPL-MCNC: 34 MG/DL (ref 8–22)
CALCIUM SERPL-MCNC: 8 MG/DL (ref 8.5–10.5)
CHLORIDE SERPL-SCNC: 112 MMOL/L (ref 96–112)
CO2 SERPL-SCNC: 17 MMOL/L (ref 20–33)
CREAT SERPL-MCNC: 1.85 MG/DL (ref 0.5–1.4)
EOSINOPHIL # BLD AUTO: 0 K/UL (ref 0–0.51)
EOSINOPHIL NFR BLD: 0 % (ref 0–6.9)
ERYTHROCYTE [DISTWIDTH] IN BLOOD BY AUTOMATED COUNT: 50.2 FL (ref 35.9–50)
GLOBULIN SER CALC-MCNC: 2.4 G/DL (ref 1.9–3.5)
GLUCOSE SERPL-MCNC: 146 MG/DL (ref 65–99)
HCT VFR BLD AUTO: 26.1 % (ref 42–52)
HGB BLD-MCNC: 8.9 G/DL (ref 14–18)
LYMPHOCYTES # BLD AUTO: 0.69 K/UL (ref 1–4.8)
LYMPHOCYTES NFR BLD: 9.6 % (ref 22–41)
MAGNESIUM SERPL-MCNC: 2 MG/DL (ref 1.5–2.5)
MANUAL DIFF BLD: ABNORMAL
MCH RBC QN AUTO: 34.6 PG (ref 27–33)
MCHC RBC AUTO-ENTMCNC: 35 G/DL (ref 33.7–35.3)
MCV RBC AUTO: 98.8 FL (ref 81.4–97.8)
MONOCYTES # BLD AUTO: 0.25 K/UL (ref 0–0.85)
MONOCYTES NFR BLD AUTO: 3.5 % (ref 0–13.4)
MORPHOLOGY BLD-IMP: NORMAL
NEUTROPHILS # BLD AUTO: 6.26 K/UL (ref 1.82–7.42)
NEUTROPHILS NFR BLD: 74.6 % (ref 44–72)
NEUTS BAND NFR BLD MANUAL: 12.3 % (ref 0–10)
NRBC # BLD AUTO: 0 K/UL
NRBC BLD-RTO: 0 /100 WBC
PHOSPHATE SERPL-MCNC: 1.8 MG/DL (ref 2.5–4.5)
PLATELET # BLD AUTO: 53 K/UL (ref 164–446)
PLATELET BLD QL SMEAR: NORMAL
PLATELETS.RETICULATED NFR BLD AUTO: 5.5 K/UL (ref 0.6–13.1)
PMV BLD AUTO: 10.4 FL (ref 9–12.9)
POTASSIUM SERPL-SCNC: 3.8 MMOL/L (ref 3.6–5.5)
PREALB SERPL-MCNC: 8 MG/DL (ref 18–38)
PROT SERPL-MCNC: 4.9 G/DL (ref 6–8.2)
RBC # BLD AUTO: 2.6 M/UL (ref 4.7–6.1)
RBC BLD AUTO: NORMAL
SODIUM SERPL-SCNC: 141 MMOL/L (ref 135–145)
WBC # BLD AUTO: 7.2 K/UL (ref 4.8–10.8)

## 2020-01-07 PROCEDURE — 84100 ASSAY OF PHOSPHORUS: CPT

## 2020-01-07 PROCEDURE — A9270 NON-COVERED ITEM OR SERVICE: HCPCS | Performed by: STUDENT IN AN ORGANIZED HEALTH CARE EDUCATION/TRAINING PROGRAM

## 2020-01-07 PROCEDURE — C9113 INJ PANTOPRAZOLE SODIUM, VIA: HCPCS | Performed by: STUDENT IN AN ORGANIZED HEALTH CARE EDUCATION/TRAINING PROGRAM

## 2020-01-07 PROCEDURE — 85027 COMPLETE CBC AUTOMATED: CPT

## 2020-01-07 PROCEDURE — 83735 ASSAY OF MAGNESIUM: CPT

## 2020-01-07 PROCEDURE — 85007 BL SMEAR W/DIFF WBC COUNT: CPT

## 2020-01-07 PROCEDURE — 36415 COLL VENOUS BLD VENIPUNCTURE: CPT

## 2020-01-07 PROCEDURE — 84134 ASSAY OF PREALBUMIN: CPT

## 2020-01-07 PROCEDURE — 700111 HCHG RX REV CODE 636 W/ 250 OVERRIDE (IP): Performed by: STUDENT IN AN ORGANIZED HEALTH CARE EDUCATION/TRAINING PROGRAM

## 2020-01-07 PROCEDURE — 92526 ORAL FUNCTION THERAPY: CPT

## 2020-01-07 PROCEDURE — 87040 BLOOD CULTURE FOR BACTERIA: CPT

## 2020-01-07 PROCEDURE — 80053 COMPREHEN METABOLIC PANEL: CPT

## 2020-01-07 PROCEDURE — 700105 HCHG RX REV CODE 258: Performed by: STUDENT IN AN ORGANIZED HEALTH CARE EDUCATION/TRAINING PROGRAM

## 2020-01-07 PROCEDURE — 700101 HCHG RX REV CODE 250: Performed by: STUDENT IN AN ORGANIZED HEALTH CARE EDUCATION/TRAINING PROGRAM

## 2020-01-07 PROCEDURE — 85055 RETICULATED PLATELET ASSAY: CPT

## 2020-01-07 PROCEDURE — 99233 SBSQ HOSP IP/OBS HIGH 50: CPT | Mod: GC | Performed by: INTERNAL MEDICINE

## 2020-01-07 PROCEDURE — 700102 HCHG RX REV CODE 250 W/ 637 OVERRIDE(OP): Performed by: STUDENT IN AN ORGANIZED HEALTH CARE EDUCATION/TRAINING PROGRAM

## 2020-01-07 PROCEDURE — 770020 HCHG ROOM/CARE - TELE (206)

## 2020-01-07 RX ORDER — IPRATROPIUM BROMIDE AND ALBUTEROL SULFATE 2.5; .5 MG/3ML; MG/3ML
SOLUTION RESPIRATORY (INHALATION)
Status: ACTIVE
Start: 2020-01-07 | End: 2020-01-08

## 2020-01-07 RX ORDER — MORPHINE SULFATE 4 MG/ML
1 INJECTION, SOLUTION INTRAMUSCULAR; INTRAVENOUS EVERY 4 HOURS PRN
Status: DISCONTINUED | OUTPATIENT
Start: 2020-01-07 | End: 2020-01-12

## 2020-01-07 RX ORDER — BISACODYL 10 MG
10 SUPPOSITORY, RECTAL RECTAL
Status: DISCONTINUED | OUTPATIENT
Start: 2020-01-07 | End: 2020-01-15 | Stop reason: HOSPADM

## 2020-01-07 RX ORDER — ACETAMINOPHEN 325 MG/1
650 TABLET ORAL EVERY 6 HOURS PRN
Status: DISCONTINUED | OUTPATIENT
Start: 2020-01-07 | End: 2020-01-10

## 2020-01-07 RX ORDER — POLYETHYLENE GLYCOL 3350 17 G/17G
1 POWDER, FOR SOLUTION ORAL
Status: DISCONTINUED | OUTPATIENT
Start: 2020-01-07 | End: 2020-01-15 | Stop reason: HOSPADM

## 2020-01-07 RX ORDER — AMOXICILLIN 250 MG
2 CAPSULE ORAL 2 TIMES DAILY
Status: DISCONTINUED | OUTPATIENT
Start: 2020-01-07 | End: 2020-01-15 | Stop reason: HOSPADM

## 2020-01-07 RX ADMIN — PANTOPRAZOLE SODIUM 40 MG: 40 INJECTION, POWDER, LYOPHILIZED, FOR SOLUTION INTRAVENOUS at 18:35

## 2020-01-07 RX ADMIN — SODIUM CHLORIDE, POTASSIUM CHLORIDE, SODIUM LACTATE AND CALCIUM CHLORIDE: 600; 310; 30; 20 INJECTION, SOLUTION INTRAVENOUS at 03:03

## 2020-01-07 RX ADMIN — PIPERACILLIN AND TAZOBACTAM 4.5 G: 4; .5 INJECTION, POWDER, LYOPHILIZED, FOR SOLUTION INTRAVENOUS; PARENTERAL at 22:12

## 2020-01-07 RX ADMIN — PIPERACILLIN AND TAZOBACTAM 4.5 G: 4; .5 INJECTION, POWDER, LYOPHILIZED, FOR SOLUTION INTRAVENOUS; PARENTERAL at 08:18

## 2020-01-07 RX ADMIN — PIPERACILLIN AND TAZOBACTAM 4.5 G: 4; .5 INJECTION, POWDER, LYOPHILIZED, FOR SOLUTION INTRAVENOUS; PARENTERAL at 14:01

## 2020-01-07 RX ADMIN — POTASSIUM PHOSPHATE, MONOBASIC AND POTASSIUM PHOSPHATE, DIBASIC 30 MMOL: 224; 236 INJECTION, SOLUTION, CONCENTRATE INTRAVENOUS at 08:19

## 2020-01-07 RX ADMIN — THIAMINE HYDROCHLORIDE 100 MG: 100 INJECTION, SOLUTION INTRAMUSCULAR; INTRAVENOUS at 08:18

## 2020-01-07 RX ADMIN — SODIUM CHLORIDE, POTASSIUM CHLORIDE, SODIUM LACTATE AND CALCIUM CHLORIDE: 600; 310; 30; 20 INJECTION, SOLUTION INTRAVENOUS at 21:20

## 2020-01-07 RX ADMIN — PANTOPRAZOLE SODIUM 40 MG: 40 INJECTION, POWDER, LYOPHILIZED, FOR SOLUTION INTRAVENOUS at 08:18

## 2020-01-07 RX ADMIN — ACETAMINOPHEN 650 MG: 325 TABLET, FILM COATED ORAL at 23:57

## 2020-01-07 ASSESSMENT — ENCOUNTER SYMPTOMS
DIZZINESS: 0
DIARRHEA: 0
BRUISES/BLEEDS EASILY: 0
HEADACHES: 0
SENSORY CHANGE: 0
TREMORS: 0
FEVER: 0
HEMOPTYSIS: 0
PALPITATIONS: 0
BACK PAIN: 1
SPUTUM PRODUCTION: 0
HEARTBURN: 0
TINGLING: 0
WHEEZING: 0
ABDOMINAL PAIN: 1
WEIGHT LOSS: 1
DOUBLE VISION: 0
PHOTOPHOBIA: 0
FLANK PAIN: 0
WEAKNESS: 1
COUGH: 0
EYE REDNESS: 0
CHILLS: 0
STRIDOR: 0
NAUSEA: 0
MYALGIAS: 0
POLYDIPSIA: 0
BLURRED VISION: 0
CONSTIPATION: 0
SORE THROAT: 0
CLAUDICATION: 0
ORTHOPNEA: 0
NECK PAIN: 0
EYE PAIN: 0
VOMITING: 0
SHORTNESS OF BREATH: 0

## 2020-01-07 NOTE — DIETARY
Nutrition Support Assessment:  Day 2 of admit.  Slava Lees is a 74 y.o. male with admitting DX of Pancreatitis, Thrombocytopenia (HCC), Transaminitis.      Current problem list:  1. Acute pancreatitis  2. Acute kidney injury with acute tubular necrosis  3. Alcohol abuse  4. Pancytopenia  5. Lactic acidosis  6. Cholelithiasis  7. Electrolyte abnormality  8. Elevated troponin with ejection fraction of 55%     Assessment:  Estimated Nutritional Needs based on:   Height: 182.9 cm (6')  Weight: 54.8 kg (120 lb 13 oz)  Body mass index is 16.39 kg/m²., BMI classification: underweight    Calculation/Equation: MSJ: XAJ=1357 kcals    - 1026-9827 kcals (30-32 kcals/kg)    - 72-83 grams protein  (1.3-1.5 g/kg)     Evaluation:   1. Tube feed is appropriate. Pt with SLP recommendation for NPO with alternative source of nutrition/hydration.   2. MD order for Cortrak.   3. Pt also with MST score of 3 on nutrition screen due to report of uncertain wt loss and poor PO PTA. Pt also has low BMI. Son was at bedside during RD visit. Pt reports a UBW of 135-140 lb. He is not sure when he weighed this last. Pt's son spoke with RD outside of his father's room and son said that it has been years since his father weighed 135-140 lb. Son said that weight loss is due to pt choosing alcohol over food. Family will provide food and pt will let it go to waste. Poor PO intake has been worse over the past couple of weeks due to presence of infection. Pt reports diminished PO intake that has been < 50% of normal. Pt also has muscle loss noted in his temple region and fat loss noted in his orbital region with pronounced zygomatic arch noted.  4. GI: LBM on 1/5/20  5. : yellow UOP  6. Labs: 1/7/20: glucose=146 (H), phosphorus=1.8(L), potassium=3.8, magnesium=2. Accuchecks: 178 and 69  7. Meds: Thiamine 100 mg in D5W 100 mL IV @ 200 ml/hour, potassium phosphates 30 mmol in D5W 500 ml IV @ 83.3 ml/hour. Per MD note, plan is to change IV fluids to LR  and continue PO thiamine via Cortrak. Pt with depleted phosphorus which may be due to refeeding syndrome. Refeeding labs ordered x 5 days.      Malnutrition Risk: Pt with severe malnutrition in the context of acute injury related to diminished PO intake due to pancreatitis dx AEB report of PO intake < 50% of normal intake x a couple of weeks (2-3 weeks) and presence of  muscle loss noted in his temple region and fat loss noted in his orbital region with pronounced zygomatic arch noted.     Recommendations/Plan:  1. Initiate Diabetisource AC at 25 ml/hour and advance per protocol to a goal rate of 60 ml/hour providing 1728 kcals, 86 gm of protein and 1181 ml free water.  2. Additional fluids per MD  3. Monitor refeeding labs x 5 days.  4. Monitor weights.  5. Initiation and advancement of diet per SLP  6. RD will monitor.

## 2020-01-07 NOTE — PROGRESS NOTES
Internal Medicine Interval Note  Note Author: Sanya Alejandre M.D.     Name Slava Lees 1945   Age/Sex 74 y.o. male   MRN [id number]   Code Status DNAR/DNI     After 5PM or if no immediate response to page, please call for cross-coverage  Attending/Team: Dr Pineda/Shyam See Patient List for primary contact information  Call [phone] to page    1st Call - Day Intern (R1):   Dr Alejandre 2nd Call - Day Sr. Resident (R2/R3):   Dr Russo         Reason for interval visit  (Principal Problem)   Acute pancreatitis, SARAH, metabolic acidosis with electrolyte abnormalities and anemia      Interval Problem Daily Status Update  (24 hours, problem oriented, brief subjective history, new lab/imaging data pertinent to that problem)   A 74-year-old male with past medical history of chronic low back pain, alcohol abuse was transferred from [facility] for higher level of care.  Patient had epigastric pain, nausea, vomiting, diarrhea from 2 weeks with initial lab results showing hypoglycemia 53, elevated lipase , elevated lactic acid 8.5.ultrasound showing small gall stone without wall thickening or ductal dilatation and diffuse advanced liver disease or fatty liver.  CT abdomen without contrast as patient had SARAH showed mesenteric edema,luminal debris sludge and small stones in gall bladder fundus without wall thickening or visualized calcified common duct stones.  MRI showed acute pancreatitis, cholelithiasis with biliary dilatation without CBD stone, diffuse hepatocellular disease?  Cirrhosis and small amount of ascites.    Subjective: Overnight patient has no new complaints says he feels much better, abdominal pain improved no nausea, vomiting, diarrhea.    Objective: Patient looks better from yesterday's vitals pulse 79 with soft blood pressures 90s/60s, labs improved but still have bands, serum creatinine improved from 2.32-1.85, with low prealbumin.  Patient failed swallow eval.  with recommendations to keep n.p.o. pre-feeding trials with SLP only.    Review of Systems   Constitutional: Positive for weight loss. Negative for chills, fever and malaise/fatigue.   HENT: Negative for ear discharge, ear pain, hearing loss, sore throat and tinnitus.    Eyes: Negative for blurred vision, double vision, photophobia, pain and redness.   Respiratory: Negative for cough, hemoptysis, sputum production, shortness of breath, wheezing and stridor.    Cardiovascular: Negative for chest pain, palpitations, orthopnea, claudication and leg swelling.   Gastrointestinal: Positive for abdominal pain. Negative for constipation, diarrhea, heartburn, nausea and vomiting.   Genitourinary: Negative for dysuria, flank pain, frequency, hematuria and urgency.   Musculoskeletal: Positive for back pain. Negative for joint pain, myalgias and neck pain.   Skin: Negative for itching and rash.   Neurological: Positive for weakness. Negative for dizziness, tingling, tremors, sensory change and headaches.   Endo/Heme/Allergies: Negative for polydipsia. Does not bruise/bleed easily.       Disposition/Barriers to discharge:   Acute pancreatitis, SARAH    Consultants/Specialty  General surgery  PCP: No primary care provider on file.      Quality Measures  Quality-Core Measures   Reviewed items::  Labs reviewed, Medications reviewed and Radiology images reviewed  DVT prophylaxis - mechanical:  SCDs  Antibiotics:  Treating active infection/contamination beyond 24 hours perioperative coverage          Physical Exam       Vitals:    01/07/20 0035 01/07/20 0435 01/07/20 0747 01/07/20 1224   BP: (!) 92/59 (!) 95/63 (!) 95/63 (!) 95/51   Pulse: 84 79 76 79   Resp: 16 18 18 18   Temp: 36.4 °C (97.5 °F) 36.2 °C (97.1 °F) 36.9 °C (98.4 °F) 36.6 °C (97.8 °F)   TempSrc: Temporal Temporal Temporal Temporal   SpO2: 95% 97% 93% 93%   Weight:       Height:         Body mass index is 16.39 kg/m².    Oxygen Therapy:  Pulse Oximetry: 93 %, O2 (LPM): 0,  O2 Delivery: None (Room Air)    Physical Exam   Constitutional: He is oriented to person, place, and time and well-developed, well-nourished, and in no distress. No distress.   HENT:   Head: Normocephalic and atraumatic.   Right Ear: External ear normal.   Left Ear: External ear normal.   Eyes: Pupils are equal, round, and reactive to light. Conjunctivae and EOM are normal. Right eye exhibits no discharge. No scleral icterus.   Neck: Normal range of motion. Neck supple. No tracheal deviation present. No thyromegaly present.   Cardiovascular: Normal rate, regular rhythm and normal heart sounds. Exam reveals no gallop.   No murmur heard.  Pulmonary/Chest: Effort normal and breath sounds normal. No respiratory distress. He has no wheezes. He has no rales.   Abdominal: Soft. Bowel sounds are normal. He exhibits no distension and no mass. There is tenderness. There is no rebound and no guarding.   Musculoskeletal: Normal range of motion.         General: No tenderness, deformity or edema.   Lymphadenopathy:     He has no cervical adenopathy.   Neurological: He is alert and oriented to person, place, and time. No cranial nerve deficit. Gait normal. Coordination normal. GCS score is 15.   Skin: Skin is warm and dry. No rash noted. He is not diaphoretic. No erythema. No pallor.             Assessment/Plan     * Acute pancreatitis  Assessment & Plan  74-year-old male admitted with history  of alcohol abuse and epigastric pain, nausea, vomiting, diarrhea from 2 weeks and with elevated lipase in the 1900s  CT abdomen without contrast from outside facility showed small volume high density sludge and small stone in gallbladder fundus without thickening, visualized calcified CBD stone  Blood cultures done at [facility] pending.  Called the hospital and they are not ready yet.  I will call again tomorrow  Got IV Zosyn and vancomycin at that hospital  Lactic 8.5-> 5.6->1.9  Anion gap of 20 on admission likely related to  lactic acidosis/ starvation/ alcoholic   MRI showed acute pancreatitis, cholelithiasis with biliary dilatation without CBD stone, diffuse hepatocellular disease?  Cirrhosis and small amount of ascites.  Plan  Continue IV fluid LR   IV morphine for pain control  Continue IV Zosyn  Replete electrolytes  Diet follow Speech recommendations with Cortrak feeding  Nutrition consult  Appreciate surgery recommendations.    Acute kidney injury (SARAH) with acute tubular necrosis (ATN) (HCC)  Assessment & Plan  BUN/Cr 65/2.32 on admission  Likely pre-renal due to poor oral intake and diarrhea  Patient's baseline creatinine is unknown.  Patient states he did not see his PCP Dr. Sol from last 3 years  Called the [facility] for records said they sent all the records want to have from admission.  Plan  Continue IV fluid  For bladder scan  Avoid nephrotoxic medication  Down trending, continue to monitor     Alcohol abuse  Assessment & Plan  Patient with h/o alcohol abuse and developed acute pancreatitis.  PO thiamine.    Pancytopenia (HCC)  Assessment & Plan  Patient admitted for acute pancreatitis secondary to ?alcohol labs showed pancytopenia with hemoglobin 8.9 from 11.6 platelets 53 from 142 and WBC 7.2 from 4.2  Plan monitor labs closely  CBC with differential daily  For type and crossmatch  Transfuse if necessary    Lactic acidosis  Assessment & Plan  Per outside hospital record, lactic was elevated 8.5 -> 5.6 at outside hospital  On admission lactic was 1.9  Anion Gap of 20 on admission, like combination of lactic acidosis and starvation/alcoholic acidosis    Plan  Continue to fluid resuscitate      Cholelithiasis  Assessment & Plan  As seen on CT abdomen from outside hospital  MRI showed acute pancreatitis, cholelithiasis with biliary dilatation without CBD stone, diffuse hepatocellular disease?  Cirrhosis and small amount of ascites.  Follow surgery recommendations for possible cholecystectomy  Appreciates surgery  recommendations.      Electrolyte abnormality  Assessment & Plan  Hypocalcemia, hypomagnesemia, hypokalemia  resolved  Plan  Replete as needed      Elevated troponin  Assessment & Plan  36->40 on admission  Likely demand ischemia  No EKG changes  Echo:Left ventricular ejection fraction is visually estimated to be 55%.  Calcified aortic valve leaflets.  Mild mitral annular calcification.  Mild tricuspid regurgitation.  Right ventricular systolic pressure is estimated to be 40 mmHg.  Dilated inferior vena cava with inspiratory collapse.  Plan  Fluid resuscitate   telemonitor

## 2020-01-07 NOTE — CARE PLAN
Problem: Infection  Goal: Will remain free from infection  Outcome: PROGRESSING AS EXPECTED     Problem: Knowledge Deficit  Goal: Knowledge of disease process/condition, treatment plan, diagnostic tests, and medications will improve  Outcome: PROGRESSING SLOWER THAN EXPECTED     Problem: Communication  Goal: The ability to communicate needs accurately and effectively will improve  Outcome: MET     Problem: Safety  Goal: Will remain free from injury  Outcome: MET

## 2020-01-07 NOTE — THERAPY
"  Speech Language Therapy dysphagia tx completed.  Functional Status: Patient was seen for a swallow re-assess this date. Patient was upright in bed, awake with overall generalized weakness. Wet vocal quality at baseline noted with difficulties with secretions. Patient denies n/v this session and willing to consume PO trials. Patient consumed PO trials of 3 ice chips, 1/2 tsp, tsp and side of cup thins, 1/2 tsp, tsp and side of cup nectars. Patient demonstrated increased wet vocal quality, with consistent delayed coughing and throat clearing. Patient demonstrated oral holding, delayed onset of swallow, decreased laryngeal elevation and hyolaryngeal excursion noted upon palpation. Patient swallowing 3-4x for each bite/sip to clear bolus. Weak and non productive cough. Patient at this time is at high risk for aspiration and demonstrating clinical s/sx of aspiration. Patient would benefit from NPO and alternative source of nutrition/hydration at this time. Discussed with RN and education provided to patient. Patient verbalized understanding and agreed to POC. SLP to continue to follow for dysphagia tx.   Recommendations - Diet: Diet / Liquid Recommendation: NPO, Pre-Feeding Trials with SLP Only                          Strategies: to be assessed                          Medication Administration: Medication Administration : Other (See Comments)(no oral)  Plan of Care: Will benefit from Speech Therapy 3 times per week  Post-Acute Therapy: Discharge to a transitional care facility for continued skilled therapy services.    See \"Rehab Therapy-Acute\" Patient Summary Report for complete documentation.   "

## 2020-01-07 NOTE — PROGRESS NOTES
Surgical Progress Note:    S:  - Pain improving  - Passing gas  - No chest pain, extremity pain, or difficulty breathing    Vitals:  BP (!) 95/63   Pulse 79   Temp 36.2 °C (97.1 °F) (Temporal)   Resp 18   Ht 1.829 m (6')   Wt 54.8 kg (120 lb 13 oz)   SpO2 97%   BMI 16.39 kg/m²     I/O:     Intake/Output Summary (Last 24 hours) at 1/7/2020 0833  Last data filed at 1/6/2020 1400  Gross per 24 hour   Intake 0 ml   Output --   Net 0 ml       P/E:  Constitutional: Appears well, no distress  Head/Neck: Normocephalic, atraumatic, neck supple  Cardiovascular: Normal rate and rhythm  Pulmonary/Chest: Normal respiratory effort, no wheezes  Abdominal: Soft, moderately tender, no distension, no peritonitis  Musculoskeletal: No deficits  Neurological:  Alert, oriented  Skin:  Warm and dry, no erythema  Extremities: No edema, no evidence of DVT    Labs:  Recent Labs     01/05/20  2105 01/06/20  0010 01/07/20  0359   WBC 4.2* 4.1* 7.2   RBC 2.62* 2.15* 2.60*   HEMOGLOBIN 8.9* 7.3* 8.9*   HEMATOCRIT 26.6* 22.1* 26.1*   .5* 102.8* 98.8*   MCH 34.0* 34.0* 34.6*   RDW 50.0 50.3* 50.2*   PLATELETCT 73* 55* 53*   MPV 9.7 9.8 10.4   NEUTSPOLYS 53.10 70.20 74.60*   LYMPHOCYTES 17.70* 10.50* 9.60*   MONOCYTES 3.50 3.50 3.50   EOSINOPHILS 0.00 0.00 0.00   BASOPHILS 0.90 0.00 0.00   RBCMORPHOLO Present Normal Normal     Recent Labs     01/06/20  0010 01/06/20  0816 01/07/20  0230   SODIUM 142 137 141   POTASSIUM 3.0* 4.2 3.8   CHLORIDE 110 105 112   CO2 16* 15* 17*   GLUCOSE 152* 101* 146*   BUN 31* 36* 34*         A/P:   - Trial of sips of clears  - Ambulate at least QID, up in chair for all meals  - Multimodal analgesia, try to avoid narcotics  - Lovenox/SCDs    MRCP:  1.  Acute pancreatitis  2.  Cholelithiasis. There is biliary dilation without presence of common bile duct stone. Therefore, dilation is likely secondary to pancreatic head edema producing partial obstruction.  3.  Morphologic changes of the liver consistent  with diffuse hepatocellular disease and probable cirrhosis  4.  Small amount of ascites    Long discussion with patient and his son Diana ([phone]) about risks  and benefits (recurrence risk is documented at 25-75% recurrence with up to 40% significant morbidity at recurrence) of undergoing surgery for possible gallstone pancreatitis.  We discussed his overall poor health and recent physical decline, frailty, including significant alcoholism, smoking, probable cirrhosis on MRCP, and malnourishment.  The patient and his son are leaning towards avoiding surgery due to his high saba-op morbidity and mortality risk, despite the risk of recurrent pancreatitis.  I support their decision as the patient is a high surgical risk, with possible little benefit if he continues smoking and drinking after discharge.    If we decide to not undergo surgery, then the patient may benefit from ERCP and sphincterotomy to try to reduce the risk of recurrence.        Karla Vela M.D.  [facility]  [phone]

## 2020-01-07 NOTE — CARE PLAN
Problem: Communication  Goal: The ability to communicate needs accurately and effectively will improve  Outcome: PROGRESSING AS EXPECTED  Note:   Pt communicates needs effectively and calls for assistance appropriately.     Problem: Safety  Goal: Will remain free from falls  Outcome: PROGRESSING AS EXPECTED  Note:   Pt verbalizes understanding of fall precautions and calls for assistance appropriately. Bed in lowest position and locked. Strip alarm in use. Pt wearing non-slip socks, call light within reach.

## 2020-01-08 LAB
ALBUMIN SERPL BCP-MCNC: 2.6 G/DL (ref 3.2–4.9)
ALBUMIN/GLOB SERPL: 1.1 G/DL
ALP SERPL-CCNC: 62 U/L (ref 30–99)
ALT SERPL-CCNC: 45 U/L (ref 2–50)
ANION GAP SERPL CALC-SCNC: 12 MMOL/L (ref 0–11.9)
AST SERPL-CCNC: 88 U/L (ref 12–45)
BASOPHILS # BLD AUTO: 0.3 % (ref 0–1.8)
BASOPHILS # BLD: 0.02 K/UL (ref 0–0.12)
BILIRUB SERPL-MCNC: 0.7 MG/DL (ref 0.1–1.5)
BUN SERPL-MCNC: 30 MG/DL (ref 8–22)
CALCIUM SERPL-MCNC: 7.9 MG/DL (ref 8.5–10.5)
CHLORIDE SERPL-SCNC: 111 MMOL/L (ref 96–112)
CO2 SERPL-SCNC: 16 MMOL/L (ref 20–33)
CREAT SERPL-MCNC: 1.54 MG/DL (ref 0.5–1.4)
CRP SERPL HS-MCNC: 13.59 MG/DL (ref 0–0.75)
EOSINOPHIL # BLD AUTO: 0.01 K/UL (ref 0–0.51)
EOSINOPHIL NFR BLD: 0.2 % (ref 0–6.9)
ERYTHROCYTE [DISTWIDTH] IN BLOOD BY AUTOMATED COUNT: 51.5 FL (ref 35.9–50)
GLOBULIN SER CALC-MCNC: 2.3 G/DL (ref 1.9–3.5)
GLUCOSE SERPL-MCNC: 140 MG/DL (ref 65–99)
HCT VFR BLD AUTO: 24.3 % (ref 42–52)
HGB BLD-MCNC: 8.4 G/DL (ref 14–18)
IMM GRANULOCYTES # BLD AUTO: 0.02 K/UL (ref 0–0.11)
IMM GRANULOCYTES NFR BLD AUTO: 0.3 % (ref 0–0.9)
LYMPHOCYTES # BLD AUTO: 1.22 K/UL (ref 1–4.8)
LYMPHOCYTES NFR BLD: 18.5 % (ref 22–41)
MAGNESIUM SERPL-MCNC: 1.6 MG/DL (ref 1.5–2.5)
MCH RBC QN AUTO: 34.4 PG (ref 27–33)
MCHC RBC AUTO-ENTMCNC: 34.6 G/DL (ref 33.7–35.3)
MCV RBC AUTO: 99.6 FL (ref 81.4–97.8)
MONOCYTES # BLD AUTO: 0.38 K/UL (ref 0–0.85)
MONOCYTES NFR BLD AUTO: 5.8 % (ref 0–13.4)
NEUTROPHILS # BLD AUTO: 4.94 K/UL (ref 1.82–7.42)
NEUTROPHILS NFR BLD: 74.9 % (ref 44–72)
NRBC # BLD AUTO: 0 K/UL
NRBC BLD-RTO: 0 /100 WBC
PHOSPHATE SERPL-MCNC: 3.2 MG/DL (ref 2.5–4.5)
PLATELET # BLD AUTO: 52 K/UL (ref 164–446)
PMV BLD AUTO: 10.9 FL (ref 9–12.9)
POTASSIUM SERPL-SCNC: 3.4 MMOL/L (ref 3.6–5.5)
PREALB SERPL-MCNC: 8 MG/DL (ref 18–38)
PROT SERPL-MCNC: 4.9 G/DL (ref 6–8.2)
RBC # BLD AUTO: 2.44 M/UL (ref 4.7–6.1)
SODIUM SERPL-SCNC: 139 MMOL/L (ref 135–145)
WBC # BLD AUTO: 6.6 K/UL (ref 4.8–10.8)

## 2020-01-08 PROCEDURE — 84134 ASSAY OF PREALBUMIN: CPT

## 2020-01-08 PROCEDURE — 700102 HCHG RX REV CODE 250 W/ 637 OVERRIDE(OP): Performed by: INTERNAL MEDICINE

## 2020-01-08 PROCEDURE — 36415 COLL VENOUS BLD VENIPUNCTURE: CPT

## 2020-01-08 PROCEDURE — 83735 ASSAY OF MAGNESIUM: CPT

## 2020-01-08 PROCEDURE — 700111 HCHG RX REV CODE 636 W/ 250 OVERRIDE (IP): Performed by: STUDENT IN AN ORGANIZED HEALTH CARE EDUCATION/TRAINING PROGRAM

## 2020-01-08 PROCEDURE — 99233 SBSQ HOSP IP/OBS HIGH 50: CPT | Mod: GC | Performed by: INTERNAL MEDICINE

## 2020-01-08 PROCEDURE — 80053 COMPREHEN METABOLIC PANEL: CPT

## 2020-01-08 PROCEDURE — 700105 HCHG RX REV CODE 258

## 2020-01-08 PROCEDURE — C9113 INJ PANTOPRAZOLE SODIUM, VIA: HCPCS | Performed by: STUDENT IN AN ORGANIZED HEALTH CARE EDUCATION/TRAINING PROGRAM

## 2020-01-08 PROCEDURE — 86140 C-REACTIVE PROTEIN: CPT

## 2020-01-08 PROCEDURE — A9270 NON-COVERED ITEM OR SERVICE: HCPCS | Performed by: STUDENT IN AN ORGANIZED HEALTH CARE EDUCATION/TRAINING PROGRAM

## 2020-01-08 PROCEDURE — A9270 NON-COVERED ITEM OR SERVICE: HCPCS | Performed by: INTERNAL MEDICINE

## 2020-01-08 PROCEDURE — 85025 COMPLETE CBC W/AUTO DIFF WBC: CPT

## 2020-01-08 PROCEDURE — 770020 HCHG ROOM/CARE - TELE (206)

## 2020-01-08 PROCEDURE — 84100 ASSAY OF PHOSPHORUS: CPT

## 2020-01-08 PROCEDURE — 51798 US URINE CAPACITY MEASURE: CPT

## 2020-01-08 PROCEDURE — 700105 HCHG RX REV CODE 258: Performed by: STUDENT IN AN ORGANIZED HEALTH CARE EDUCATION/TRAINING PROGRAM

## 2020-01-08 PROCEDURE — 700102 HCHG RX REV CODE 250 W/ 637 OVERRIDE(OP): Performed by: STUDENT IN AN ORGANIZED HEALTH CARE EDUCATION/TRAINING PROGRAM

## 2020-01-08 RX ORDER — POTASSIUM CHLORIDE 20 MEQ/1
40 TABLET, EXTENDED RELEASE ORAL ONCE
Status: COMPLETED | OUTPATIENT
Start: 2020-01-08 | End: 2020-01-08

## 2020-01-08 RX ORDER — SODIUM CHLORIDE 9 MG/ML
500 INJECTION, SOLUTION INTRAVENOUS ONCE
Status: COMPLETED | OUTPATIENT
Start: 2020-01-08 | End: 2020-01-08

## 2020-01-08 RX ORDER — MAGNESIUM SULFATE HEPTAHYDRATE 40 MG/ML
2 INJECTION, SOLUTION INTRAVENOUS ONCE
Status: COMPLETED | OUTPATIENT
Start: 2020-01-08 | End: 2020-01-08

## 2020-01-08 RX ORDER — HEPARIN SODIUM 5000 [USP'U]/ML
5000 INJECTION, SOLUTION INTRAVENOUS; SUBCUTANEOUS EVERY 12 HOURS
Status: DISCONTINUED | OUTPATIENT
Start: 2020-01-08 | End: 2020-01-13

## 2020-01-08 RX ORDER — SODIUM CHLORIDE 9 MG/ML
INJECTION, SOLUTION INTRAVENOUS
Status: COMPLETED
Start: 2020-01-08 | End: 2020-01-08

## 2020-01-08 RX ADMIN — SODIUM CHLORIDE 500 ML: 9 INJECTION, SOLUTION INTRAVENOUS at 14:24

## 2020-01-08 RX ADMIN — PIPERACILLIN AND TAZOBACTAM 4.5 G: 4; .5 INJECTION, POWDER, LYOPHILIZED, FOR SOLUTION INTRAVENOUS; PARENTERAL at 05:35

## 2020-01-08 RX ADMIN — ACETAMINOPHEN 650 MG: 325 TABLET, FILM COATED ORAL at 08:52

## 2020-01-08 RX ADMIN — MORPHINE SULFATE 1 MG: 4 INJECTION INTRAVENOUS at 20:52

## 2020-01-08 RX ADMIN — MAGNESIUM SULFATE IN WATER 2 G: 40 INJECTION, SOLUTION INTRAVENOUS at 08:54

## 2020-01-08 RX ADMIN — THIAMINE HYDROCHLORIDE 100 MG: 100 INJECTION, SOLUTION INTRAMUSCULAR; INTRAVENOUS at 07:48

## 2020-01-08 RX ADMIN — ACETAMINOPHEN 650 MG: 325 TABLET, FILM COATED ORAL at 19:24

## 2020-01-08 RX ADMIN — PANTOPRAZOLE SODIUM 40 MG: 40 INJECTION, POWDER, LYOPHILIZED, FOR SOLUTION INTRAVENOUS at 05:23

## 2020-01-08 RX ADMIN — OMEPRAZOLE 40 MG: KIT at 22:20

## 2020-01-08 RX ADMIN — POTASSIUM CHLORIDE 40 MEQ: 1500 TABLET, EXTENDED RELEASE ORAL at 07:52

## 2020-01-08 RX ADMIN — HEPARIN SODIUM 5000 UNITS: 5000 INJECTION, SOLUTION INTRAVENOUS; SUBCUTANEOUS at 17:19

## 2020-01-08 RX ADMIN — PIPERACILLIN AND TAZOBACTAM 4.5 G: 4; .5 INJECTION, POWDER, LYOPHILIZED, FOR SOLUTION INTRAVENOUS; PARENTERAL at 22:13

## 2020-01-08 NOTE — PROGRESS NOTES
Assumed care of patient, bedside report received from Saige WATSON. Updated on POC, call light within reach and fall precautions in place. Bed locked and in lowest position. Patient instructed to call for assistance before getting out of bed. All questions answered, no other needs at this time.

## 2020-01-08 NOTE — CARE PLAN
Problem: Safety  Goal: Will remain free from falls  Outcome: PROGRESSING AS EXPECTED  Patient educated on level of risk. Patient verbalized understanding. Bed is locked and in lowest position. Call light within reach. Treaded socks on. Will continue to monitor.       Problem: Pain Management  Goal: Pain level will decrease to patient's comfort goal  Outcome: PROGRESSING AS EXPECTED  Patient stated he was having 9/10 pain in his lower back. Heat pack and pain medication per MAR were provided. When reassessed, patient was calm, sleeping, and had unlabored breathing.

## 2020-01-08 NOTE — PROGRESS NOTES
Assumed care of patient, bedside report received from WALTER Caballero. Updated on POC, call light within reach and fall precautions in place. Bed locked and in lowest position. Patient instructed to call for assistance before getting out of bed. All questions answered, no other needs at this time.

## 2020-01-08 NOTE — PROGRESS NOTES
Internal Medicine Interval Note  Note Author: Sanya Alejandre M.D.     Name Slava Lees 1945   Age/Sex 74 y.o. male   MRN [id number]   Code Status DNAR/DNI      After 5PM or if no immediate response to page, please call for cross-coverage  Attending/Team: Dr Pineda/Shyam See Patient List for primary contact information  Call [phone] to page    1st Call - Day Intern (R1):   Dr Alejandre 2nd Call - Day Sr. Resident (R2/R3):   Dr Russo           Reason for interval visit  (Principal Problem)   Acute pancreatitis, SARAH, metabolic acidosis with electrolyte abnormalities and anemia      Interval Problem Daily Status Update  (24 hours, problem oriented, brief subjective history, new lab/imaging data pertinent to that problem)   A 74-year-old male with past medical history of chronic low back pain, alcohol abuse was transferred from [facility] for higher level of care.  Patient had epigastric pain, nausea, vomiting, diarrhea from 2 weeks with initial lab results showing hypoglycemia 53, elevated lipase , elevated lactic acid 8.5.ultrasound showing small gall stone without wall thickening or ductal dilatation and diffuse advanced liver disease or fatty liver.  CT abdomen without contrast as patient had SARAH showed mesenteric edema,luminal debris sludge and small stones in gall bladder fundus without wall thickening or visualized calcified common duct stones.  MRI showed acute pancreatitis, cholelithiasis with biliary dilatation without CBD stone, diffuse hepatocellular disease?  Cirrhosis and small amount of ascites.    Subjective: Overnight patient has no new complaints says he feels much better, no abdominal pain, no nausea, vomiting, diarrhea.  But had 1 episode of bowel movement which was loose.     Objective:  Patient looks much better than yesterday with the pulse normal but systolic pressures in the 90s, labs improved no bands seen today , serum creatinine improved to  1.54 with a low prealbumin.  Patient is getting tube feeds tolerating well no complaints increased to 50 ml/h now     Review of Systems   Constitutional: Positive for weight loss. Negative for chills, fever and malaise/fatigue.   HENT: Negative for ear discharge, ear pain, hearing loss, sore throat and tinnitus.    Eyes: Negative for blurred vision, double vision, photophobia, pain and redness.   Respiratory: Negative for cough, hemoptysis, sputum production, shortness of breath, wheezing and stridor.    Cardiovascular: Negative for chest pain, palpitations, orthopnea, claudication and leg swelling.   Gastrointestinal: Negative for  abdominal pain, constipation, diarrhea, heartburn, nausea and vomiting.   Genitourinary: Negative for dysuria, flank pain, frequency, hematuria and urgency.   Musculoskeletal: Positive for back pain. Negative for joint pain, myalgias and neck pain.   Skin: Negative for itching and rash.   Neurological: Positive for weakness. Negative for dizziness, tingling, tremors, sensory change and headaches.   Endo/Heme/Allergies: Negative for polydipsia. Does not bruise/bleed easily.     Disposition/Barriers to discharge:   Acute pancreatitis, SARAH    Consultants/Specialty  General surgery    PCP: No primary care provider on file.      Quality Measures  Quality-Core Measures   Reviewed items::  Labs reviewed, Medications reviewed and Radiology images reviewed  DVT prophylaxis - SCD's and Heparin.  Antibiotics:  Treating active infection/contamination beyond 24 hours perioperative coverage      Physical Exam       Vitals:    01/08/20 0103 01/08/20 0445 01/08/20 0741 01/08/20 1132   BP: (!) 96/65 (!) 93/60 (!) 90/58 (!) 82/51   Pulse: 88 77 78 82   Resp: 20 18 17 16   Temp: 36.3 °C (97.4 °F) 36.3 °C (97.3 °F) 36.4 °C (97.6 °F) 36.4 °C (97.5 °F)   TempSrc: Temporal Temporal Temporal Temporal   SpO2: 94% 98% 97% 96%   Weight:       Height:         Body mass index is 16.59 kg/m². Weight: 55.5 kg (122 lb  5.7 oz)  Oxygen Therapy:  Pulse Oximetry: 96 %, O2 (LPM): 0, O2 Delivery: None (Room Air)      Physical Exam   Constitutional: He is oriented to person, place, and time and well-developed, well-nourished, and in no distress. No distress.   HENT:   Head: Normocephalic and atraumatic.   Right Ear: External ear normal.   Left Ear: External ear normal.   Eyes: Pupils are equal, round, and reactive to light. Conjunctivae and EOM are normal. Right eye exhibits no discharge. No scleral icterus.   Neck: Normal range of motion. Neck supple. No tracheal deviation present. No thyromegaly present.   Cardiovascular: Normal rate, regular rhythm and normal heart sounds. Exam reveals no gallop.   No murmur heard.  Pulmonary/Chest: Effort normal and breath sounds normal. No respiratory distress. He has no wheezes. He has no rales.   Abdominal: Soft. Bowel sounds are normal. He exhibits no distension and no mass.There is no tenderness, no rebound and no guarding.   Musculoskeletal: Normal range of motion.         General: No tenderness, deformity or edema.   Lymphadenopathy:     He has no cervical adenopathy.   Neurological: He is alert and oriented to person, place, and time. No cranial nerve deficit. Gait normal. Coordination normal. GCS score is 15.   Skin: Skin is warm and dry. No rash noted. He is not diaphoretic. No erythema. No pallor.       Assessment/Plan     * Acute pancreatitis  Assessment & Plan  74-year-old male admitted with history  of alcohol abuse and epigastric pain, nausea, vomiting, diarrhea from 2 weeks and with elevated lipase in the 1900s  CT abdomen without contrast from outside facility showed small volume high density sludge and small stone in gallbladder fundus without thickening, visualized calcified CBD stone  Blood cultures done at [facility] pending.  Called the hospital and they are not ready yet.  I will call again tomorrow  Got IV Zosyn and vancomycin at that hospital  Lactic 8.5->  5.6->1.9  Anion gap of 20 on admission likely related to lactic acidosis/ starvation/ alcoholic   MRI showed acute pancreatitis, cholelithiasis with biliary dilatation without CBD stone, diffuse hepatocellular disease?  Cirrhosis and small amount of ascites.  Called [facility] for Blood cultures told [facility]GT[facility].   Plan  Continue IV fluid LR   IV morphine for pain control  Continue IV Zosyn  Replete electrolytes  Diet follow Speech recommendations with Cortrak feeding  Nutrition consult  Appreciate surgery recommendations.    Acute kidney injury (SARAH) with acute tubular necrosis (ATN) (HCC)  Assessment & Plan  BUN/Cr 65/2.32 on admission  Likely pre-renal due to poor oral intake and diarrhea  Patient's baseline creatinine is unknown.  Patient states he did not see his PCP Dr. Sol from last 3 years  Called the [facility] for records said they sent all the records want to have from admission.  Plan  Continue IV fluid  For bladder scan  Avoid nephrotoxic medication  Down trending, continue to monitor     Alcohol abuse  Assessment & Plan  Patient with h/o alcohol abuse and developed acute pancreatitis.  PO thiamine.    Pancytopenia (HCC)  Assessment & Plan  Patient admitted for acute pancreatitis secondary to ?alcohol labs showed pancytopenia   Plan monitor labs closely  CBC with differential daily  For type and crossmatch  Transfuse if necessary    Lactic acidosis  Assessment & Plan  Per outside hospital record, lactic was elevated 8.5 -> 5.6 at outside hospital  On admission lactic was 1.9  Plan  Repeat AM.  Continue to fluid resuscitate      Cholelithiasis  Assessment & Plan  As seen on CT abdomen from outside hospital  MRI showed acute pancreatitis, cholelithiasis with biliary dilatation without CBD stone, diffuse hepatocellular disease?  Cirrhosis and small amount of ascites.  Follow surgery recommendations for possible cholecystectomy  Appreciates surgery recommendations.      Electrolyte  abnormality  Assessment & Plan  Hypocalcemia, hypomagnesemia, hypokalemia    Plan  Replete as needed      Elevated troponin  Assessment & Plan  36->40 on admission  Likely demand ischemia  No EKG changes  Echo:Left ventricular ejection fraction is visually estimated to be 55%.  Calcified aortic valve leaflets.  Mild mitral annular calcification.  Mild tricuspid regurgitation.  Right ventricular systolic pressure is estimated to be 40 mmHg.  Dilated inferior vena cava with inspiratory collapse.  Plan  Fluid resuscitate   telemonitor

## 2020-01-08 NOTE — CARE PLAN
Problem: Nutritional:  Goal: Nutrition support tolerated and meeting greater than 85% of estimated needs  Outcome: PROGRESSING AS EXPECTED    TF @ 50 ml/hour. No report of hunger or intolerance currently.

## 2020-01-08 NOTE — PROGRESS NOTES
Cortrak Placement    Tube Team verified patient name and medical record number prior to tube placement.  Cortrak tube (43 inches, 10 Nauruan) placed at 65 cm in left nare.  Per Cortrak picture, tube appears to be in the stomach.  Nursing Instructions: Awaiting KUB to confirm placement before use for medications or feeding. Once placement confirmed, flush tube with 30 ml of water, and then remove and save stylet, in patient medication drawer.

## 2020-01-09 LAB
ALBUMIN SERPL BCP-MCNC: 2.6 G/DL (ref 3.2–4.9)
ALBUMIN/GLOB SERPL: 1.1 G/DL
ALP SERPL-CCNC: 124 U/L (ref 30–99)
ALT SERPL-CCNC: 46 U/L (ref 2–50)
ANION GAP SERPL CALC-SCNC: 10 MMOL/L (ref 0–11.9)
AST SERPL-CCNC: 79 U/L (ref 12–45)
BASOPHILS # BLD AUTO: 0.2 % (ref 0–1.8)
BASOPHILS # BLD: 0.01 K/UL (ref 0–0.12)
BILIRUB SERPL-MCNC: 0.5 MG/DL (ref 0.1–1.5)
BUN SERPL-MCNC: 28 MG/DL (ref 8–22)
CALCIUM SERPL-MCNC: 7.9 MG/DL (ref 8.5–10.5)
CHLORIDE SERPL-SCNC: 113 MMOL/L (ref 96–112)
CO2 SERPL-SCNC: 21 MMOL/L (ref 20–33)
COMMENT 1642: NORMAL
CREAT SERPL-MCNC: 1.28 MG/DL (ref 0.5–1.4)
EOSINOPHIL # BLD AUTO: 0.04 K/UL (ref 0–0.51)
EOSINOPHIL NFR BLD: 0.8 % (ref 0–6.9)
ERYTHROCYTE [DISTWIDTH] IN BLOOD BY AUTOMATED COUNT: 53.1 FL (ref 35.9–50)
ERYTHROCYTE [DISTWIDTH] IN BLOOD BY AUTOMATED COUNT: 53.9 FL (ref 35.9–50)
GLOBULIN SER CALC-MCNC: 2.3 G/DL (ref 1.9–3.5)
GLUCOSE SERPL-MCNC: 143 MG/DL (ref 65–99)
HCT VFR BLD AUTO: 25.3 % (ref 42–52)
HCT VFR BLD AUTO: 25.3 % (ref 42–52)
HGB BLD-MCNC: 8.4 G/DL (ref 14–18)
HGB BLD-MCNC: 8.6 G/DL (ref 14–18)
IMM GRANULOCYTES # BLD AUTO: 0.03 K/UL (ref 0–0.11)
IMM GRANULOCYTES NFR BLD AUTO: 0.6 % (ref 0–0.9)
LYMPHOCYTES # BLD AUTO: 1.2 K/UL (ref 1–4.8)
LYMPHOCYTES NFR BLD: 24.2 % (ref 22–41)
MAGNESIUM SERPL-MCNC: 1.8 MG/DL (ref 1.5–2.5)
MCH RBC QN AUTO: 33.5 PG (ref 27–33)
MCH RBC QN AUTO: 34 PG (ref 27–33)
MCHC RBC AUTO-ENTMCNC: 33.2 G/DL (ref 33.7–35.3)
MCHC RBC AUTO-ENTMCNC: 34 G/DL (ref 33.7–35.3)
MCV RBC AUTO: 100 FL (ref 81.4–97.8)
MCV RBC AUTO: 100.8 FL (ref 81.4–97.8)
MONOCYTES # BLD AUTO: 0.55 K/UL (ref 0–0.85)
MONOCYTES NFR BLD AUTO: 11.1 % (ref 0–13.4)
MORPHOLOGY BLD-IMP: NORMAL
NEUTROPHILS # BLD AUTO: 3.12 K/UL (ref 1.82–7.42)
NEUTROPHILS NFR BLD: 63.1 % (ref 44–72)
NRBC # BLD AUTO: 0 K/UL
NRBC BLD-RTO: 0 /100 WBC
PHOSPHATE SERPL-MCNC: 1.7 MG/DL (ref 2.5–4.5)
PLATELET # BLD AUTO: 46 K/UL (ref 164–446)
PLATELET # BLD AUTO: 51 K/UL (ref 164–446)
PLATELETS.RETICULATED NFR BLD AUTO: 8 K/UL (ref 0.6–13.1)
PMV BLD AUTO: 11.2 FL (ref 9–12.9)
PMV BLD AUTO: 11.4 FL (ref 9–12.9)
POTASSIUM SERPL-SCNC: 3.5 MMOL/L (ref 3.6–5.5)
PROT SERPL-MCNC: 4.9 G/DL (ref 6–8.2)
RBC # BLD AUTO: 2.51 M/UL (ref 4.7–6.1)
RBC # BLD AUTO: 2.53 M/UL (ref 4.7–6.1)
SODIUM SERPL-SCNC: 144 MMOL/L (ref 135–145)
WBC # BLD AUTO: 4.3 K/UL (ref 4.8–10.8)
WBC # BLD AUTO: 5 K/UL (ref 4.8–10.8)

## 2020-01-09 PROCEDURE — 700105 HCHG RX REV CODE 258

## 2020-01-09 PROCEDURE — 700111 HCHG RX REV CODE 636 W/ 250 OVERRIDE (IP): Performed by: STUDENT IN AN ORGANIZED HEALTH CARE EDUCATION/TRAINING PROGRAM

## 2020-01-09 PROCEDURE — 97162 PT EVAL MOD COMPLEX 30 MIN: CPT

## 2020-01-09 PROCEDURE — 97165 OT EVAL LOW COMPLEX 30 MIN: CPT

## 2020-01-09 PROCEDURE — 700105 HCHG RX REV CODE 258: Performed by: STUDENT IN AN ORGANIZED HEALTH CARE EDUCATION/TRAINING PROGRAM

## 2020-01-09 PROCEDURE — 36415 COLL VENOUS BLD VENIPUNCTURE: CPT

## 2020-01-09 PROCEDURE — 700102 HCHG RX REV CODE 250 W/ 637 OVERRIDE(OP): Performed by: STUDENT IN AN ORGANIZED HEALTH CARE EDUCATION/TRAINING PROGRAM

## 2020-01-09 PROCEDURE — 85025 COMPLETE CBC W/AUTO DIFF WBC: CPT

## 2020-01-09 PROCEDURE — 700102 HCHG RX REV CODE 250 W/ 637 OVERRIDE(OP): Performed by: INTERNAL MEDICINE

## 2020-01-09 PROCEDURE — 85027 COMPLETE CBC AUTOMATED: CPT

## 2020-01-09 PROCEDURE — A9270 NON-COVERED ITEM OR SERVICE: HCPCS | Performed by: INTERNAL MEDICINE

## 2020-01-09 PROCEDURE — 80053 COMPREHEN METABOLIC PANEL: CPT

## 2020-01-09 PROCEDURE — 700101 HCHG RX REV CODE 250: Performed by: STUDENT IN AN ORGANIZED HEALTH CARE EDUCATION/TRAINING PROGRAM

## 2020-01-09 PROCEDURE — 83735 ASSAY OF MAGNESIUM: CPT

## 2020-01-09 PROCEDURE — A9270 NON-COVERED ITEM OR SERVICE: HCPCS | Performed by: STUDENT IN AN ORGANIZED HEALTH CARE EDUCATION/TRAINING PROGRAM

## 2020-01-09 PROCEDURE — 84100 ASSAY OF PHOSPHORUS: CPT

## 2020-01-09 PROCEDURE — 99232 SBSQ HOSP IP/OBS MODERATE 35: CPT | Mod: GC | Performed by: INTERNAL MEDICINE

## 2020-01-09 PROCEDURE — 770020 HCHG ROOM/CARE - TELE (206)

## 2020-01-09 PROCEDURE — 85055 RETICULATED PLATELET ASSAY: CPT

## 2020-01-09 RX ORDER — MAGNESIUM SULFATE HEPTAHYDRATE 40 MG/ML
2 INJECTION, SOLUTION INTRAVENOUS ONCE
Status: COMPLETED | OUTPATIENT
Start: 2020-01-09 | End: 2020-01-09

## 2020-01-09 RX ORDER — SODIUM CHLORIDE 9 MG/ML
INJECTION, SOLUTION INTRAVENOUS
Status: COMPLETED
Start: 2020-01-09 | End: 2020-01-09

## 2020-01-09 RX ADMIN — MAGNESIUM SULFATE 2 G: 2 INJECTION INTRAVENOUS at 12:30

## 2020-01-09 RX ADMIN — SODIUM CHLORIDE, POTASSIUM CHLORIDE, SODIUM LACTATE AND CALCIUM CHLORIDE: 600; 310; 30; 20 INJECTION, SOLUTION INTRAVENOUS at 20:16

## 2020-01-09 RX ADMIN — HEPARIN SODIUM 5000 UNITS: 5000 INJECTION, SOLUTION INTRAVENOUS; SUBCUTANEOUS at 05:04

## 2020-01-09 RX ADMIN — OMEPRAZOLE 40 MG: KIT at 20:10

## 2020-01-09 RX ADMIN — OMEPRAZOLE 40 MG: KIT at 05:06

## 2020-01-09 RX ADMIN — PIPERACILLIN AND TAZOBACTAM 4.5 G: 4; .5 INJECTION, POWDER, LYOPHILIZED, FOR SOLUTION INTRAVENOUS; PARENTERAL at 04:59

## 2020-01-09 RX ADMIN — SODIUM CHLORIDE 500 ML: 9 INJECTION, SOLUTION INTRAVENOUS at 14:57

## 2020-01-09 RX ADMIN — HEPARIN SODIUM 5000 UNITS: 5000 INJECTION, SOLUTION INTRAVENOUS; SUBCUTANEOUS at 18:55

## 2020-01-09 RX ADMIN — ACETAMINOPHEN 650 MG: 325 TABLET, FILM COATED ORAL at 20:09

## 2020-01-09 RX ADMIN — POTASSIUM PHOSPHATE, MONOBASIC AND POTASSIUM PHOSPHATE, DIBASIC 30 MMOL: 224; 236 INJECTION, SOLUTION, CONCENTRATE INTRAVENOUS at 19:00

## 2020-01-09 RX ADMIN — PIPERACILLIN AND TAZOBACTAM 4.5 G: 4; .5 INJECTION, POWDER, LYOPHILIZED, FOR SOLUTION INTRAVENOUS; PARENTERAL at 20:20

## 2020-01-09 RX ADMIN — MORPHINE SULFATE 1 MG: 4 INJECTION INTRAVENOUS at 05:19

## 2020-01-09 RX ADMIN — PIPERACILLIN AND TAZOBACTAM 4.5 G: 4; .5 INJECTION, POWDER, LYOPHILIZED, FOR SOLUTION INTRAVENOUS; PARENTERAL at 14:56

## 2020-01-09 RX ADMIN — THIAMINE HYDROCHLORIDE 100 MG: 100 INJECTION, SOLUTION INTRAMUSCULAR; INTRAVENOUS at 04:03

## 2020-01-09 ASSESSMENT — ACTIVITIES OF DAILY LIVING (ADL): TOILETING: INDEPENDENT

## 2020-01-09 ASSESSMENT — COGNITIVE AND FUNCTIONAL STATUS - GENERAL
TURNING FROM BACK TO SIDE WHILE IN FLAT BAD: A LITTLE
SUGGESTED CMS G CODE MODIFIER MOBILITY: CK
TOILETING: A LOT
CLIMB 3 TO 5 STEPS WITH RAILING: A LOT
EATING MEALS: TOTAL
MOBILITY SCORE: 17
DAILY ACTIVITIY SCORE: 13
SUGGESTED CMS G CODE MODIFIER DAILY ACTIVITY: CL
STANDING UP FROM CHAIR USING ARMS: A LITTLE
PERSONAL GROOMING: A LITTLE
DRESSING REGULAR UPPER BODY CLOTHING: A LITTLE
MOVING FROM LYING ON BACK TO SITTING ON SIDE OF FLAT BED: A LITTLE
DRESSING REGULAR LOWER BODY CLOTHING: A LOT
HELP NEEDED FOR BATHING: A LOT
MOVING TO AND FROM BED TO CHAIR: A LITTLE
WALKING IN HOSPITAL ROOM: A LITTLE

## 2020-01-09 ASSESSMENT — GAIT ASSESSMENTS
GAIT LEVEL OF ASSIST: MINIMAL ASSIST
DEVIATION: BRADYKINETIC;SHUFFLED GAIT
DISTANCE (FEET): 75
ASSISTIVE DEVICE: FRONT WHEEL WALKER

## 2020-01-09 NOTE — PROGRESS NOTES
Assumed care of patient, bedside report received from Shelbi WATSON. Updated on POC, call light within reach and fall precautions in place. Bed locked and in lowest position. Patient instructed to call for assistance before getting out of bed. All questions answered, no other needs at this time.

## 2020-01-09 NOTE — PROGRESS NOTES
Surgical Progress Note:    S:  - Pain improving  - feeding tube placed for poor swallowing assessment  - Passing gas  - No chest pain, extremity pain, or difficulty breathing    Vitals:  /78   Pulse 71   Temp 36.1 °C (97 °F) (Temporal)   Resp 18   Ht 1.829 m (6')   Wt 55.5 kg (122 lb 5.7 oz)   SpO2 94%   BMI 16.59 kg/m²     I/O:     Intake/Output Summary (Last 24 hours) at 1/7/2020 0833  Last data filed at 1/6/2020 1400  Gross per 24 hour   Intake 0 ml   Output --   Net 0 ml       P/E:  Constitutional: Appears well, no distress  Head/Neck: Normocephalic, atraumatic, neck supple  Cardiovascular: Normal rate and rhythm  Pulmonary/Chest: Normal respiratory effort, no wheezes  Abdominal: Soft, mildly tender midline epigastrium, no distension, no peritonitis  Musculoskeletal: No deficits  Neurological:  Alert, oriented  Skin:  Warm and dry, no erythema  Extremities: No edema, no evidence of DVT    Labs:  Recent Labs     01/07/20  0359 01/08/20 0247 01/09/20 0227 01/09/20  0653   WBC 7.2 6.6 4.3* 5.0   RBC 2.60* 2.44* 2.51* 2.53*   HEMOGLOBIN 8.9* 8.4* 8.4* 8.6*   HEMATOCRIT 26.1* 24.3* 25.3* 25.3*   MCV 98.8* 99.6* 100.8* 100.0*   MCH 34.6* 34.4* 33.5* 34.0*   RDW 50.2* 51.5* 53.1* 53.9*   PLATELETCT 53* 52* 51* 46*   MPV 10.4 10.9 11.2 11.4   NEUTSPOLYS 74.60* 74.90*  --  63.10   LYMPHOCYTES 9.60* 18.50*  --  24.20   MONOCYTES 3.50 5.80  --  11.10   EOSINOPHILS 0.00 0.20  --  0.80   BASOPHILS 0.00 0.30  --  0.20   RBCMORPHOLO Normal  --   --   --      Recent Labs     01/07/20  0230 01/08/20 0247 01/09/20 0227   SODIUM 141 139 144   POTASSIUM 3.8 3.4* 3.5*   CHLORIDE 112 111 113*   CO2 17* 16* 21   GLUCOSE 146* 140* 143*   BUN 34* 30* 28*         A/P:   - Tolerating NG feeds  - Very frail  - Will not recommend cholecystectomy due to reasons below - discussed this with the patient and his son  - Could consider ERCP with sphincterotomy to try to decrease recurrence - will leave up to primary team  -  Ambulate at least QID, up in chair for all meals  - Multimodal analgesia, try to avoid narcotics  - Lovenox/SCDs  - Will sign off - please call if any concerns arise      Long discussion Tuesday and today with patient and his son Diana ([phone]) about risks and benefits (recurrence risk is documented at 25-75% recurrence with up to 40% significant morbidity at recurrence) of undergoing surgery for possible gallstone pancreatitis.  We discussed his overall poor health and recent physical decline, frailty, including significant alcoholism, smoking, probable cirrhosis on MRCP, and malnourishment.  The patient and his son are in agreement with avoiding surgery due to his high saba-op morbidity and mortality risk and his recent significant decline, despite the risk of recurrent pancreatitis.        Karla Vela M.D.  [facility]  [phone]

## 2020-01-09 NOTE — THERAPY
Confirmed with RN that pt does have a Cortrak for alternative means of nutrition/hydration. SLP to reassess swallow function as able.

## 2020-01-09 NOTE — THERAPY
"Occupational Therapy Evaluation completed.   Functional Status: Pt is a 73 y/o male admitted with diarrhea found to have pancreatitis. He has a hx of etoh abuse. He was pleasant and cooperative, softspoken. Supv-Catrachita bed mobility. [place] sit<>stand. [place] functional mobility with FWW. ModA toilet txf. [place] toileting. BUE generally weak. He is limited by weakness, fatigue, impaired balance, which impacts independence in self care and functional mobility.  Plan of Care: Will benefit from Occupational Therapy 3 times per week  Discharge Recommendations:  Equipment: Will Continue to Assess for Equipment Needs. Recommend post-acute placement for additional occupational therapy services prior to discharge home. Patient can tolerate post-acute therapies at a 5x/week frequency.      See \"Rehab Therapy-Acute\" Patient Summary Report for complete documentation.    "

## 2020-01-09 NOTE — PROGRESS NOTES
Internal Medicine Interval Note  Note Author: Sanya Alejandre M.D.     Name Slava Lees 1945   Age/Sex 74 y.o. male   MRN [id number]   Code Status DNAR/DNI      After 5PM or if no immediate response to page, please call for cross-coverage  Attending/Team: Dr Pineda/Shyam See Patient List for primary contact information  Call [phone] to page    1st Call - Day Intern (R1):   Dr Alejandre 2nd Call - Day Sr. Resident (R2/R3):   Dr Russo            Reason for interval visit  (Principal Problem)   Acute pancreatitis, SARAH, anemia      Interval Problem Daily Status Update  (24 hours, problem oriented, brief subjective history, new lab/imaging data pertinent to that problem)   A 74-year-old male with past medical history of chronic low back pain, alcohol abuse was transferred from [facility] for higher level of care.  Patient had epigastric pain, nausea, vomiting, diarrhea from 2 weeks with initial lab results showing hypoglycemia 53, elevated lipase 1961, elevated lactic acid 8.5.ultrasound showing small gall stone without wall thickening or ductal dilatation and diffuse advanced liver disease or fatty liver.  CT abdomen without contrast as patient had SARAH showed mesenteric edema,luminal debris sludge and small stones in gall bladder fundus without wall thickening or visualized calcified common duct stones.  MRI showed acute pancreatitis, cholelithiasis with biliary dilatation without CBD stone, diffuse hepatocellular disease?  Cirrhosis and small amount of ascites.    Subjective: Overnight patient has no new complaints denies any nausea, vomiting, abdominal pain, fever, diarrhea, constipation he says he had 2 bowel movements.  And physical therapy helped him to walk he could walk later.  He did not feel any dizzy.    Objective: Patient looks better, in no acute distress, tolerating tube feeds at 60 mL/h without any complaints.  His still have soft blood pressures, creatinine  decreased to 1.28 from 1.54.    Review of Systems   Constitutional: Positive for weight loss. Negative for chills, fever and malaise/fatigue.   HENT: Negative for ear discharge, ear pain, hearing loss, sore throat and tinnitus.    Eyes: Negative for blurred vision, double vision, photophobia, pain and redness.   Respiratory: Negative for cough, hemoptysis, sputum production, shortness of breath, wheezing and stridor.    Cardiovascular: Negative for chest pain, palpitations, orthopnea, claudication and leg swelling.   Gastrointestinal: Negative for  abdominal pain, constipation, diarrhea, heartburn, nausea and vomiting.   Genitourinary: Negative for dysuria, flank pain, frequency, hematuria and urgency.   Musculoskeletal: Positive for back pain. Negative for joint pain, myalgias and neck pain.   Skin: Negative for itching and rash.   Neurological: Positive for weakness. Negative for dizziness, tingling, tremors, sensory change and headaches.   Endo/Heme/Allergies: Negative for polydipsia. Does not bruise/bleed easily.     Disposition/Barriers to discharge:   Inpatient/acute pancreatitis, SARAH    Consultants/Specialty  General surgery    PCP: No primary care provider on file.      Quality Measures  Quality-Core Measures   Reviewed items::  Labs reviewed, Medications reviewed and Radiology images reviewed  DVT prophylaxis - SCD's and Heparin.  Antibiotics:  Treating active infection/contamination beyond 24 hours perioperative coverage      Physical Exam       Vitals:    01/08/20 2017 01/08/20 2352 01/09/20 0406 01/09/20 0842   BP: 125/85 (!) 93/64 114/78 116/76   Pulse: (!) 102 83 71 87   Resp: 18 18 18 18   Temp: 36.1 °C (97 °F) 36.8 °C (98.2 °F) 36.1 °C (97 °F) 36.7 °C (98.1 °F)   TempSrc: Temporal Temporal Temporal Temporal   SpO2: 96% 97% 94% 95%   Weight:       Height:         Body mass index is 16.59 kg/m².    Oxygen Therapy:  Pulse Oximetry: 95 %, O2 (LPM): 0, O2 Delivery: None (Room Air)    Physical  Exam   Constitutional: He is oriented to person, place, and time and well-developed, well-nourished, and in no distress. No distress.   HENT:   Head: Normocephalic and atraumatic.   Right Ear: External ear normal.   Left Ear: External ear normal.   Eyes: Pupils are equal, round, and reactive to light. Conjunctivae and EOM are normal. Right eye exhibits no discharge. No scleral icterus.   Neck: Normal range of motion. Neck supple. No tracheal deviation present. No thyromegaly present.   Cardiovascular: Normal rate, regular rhythm and normal heart sounds. Exam reveals no gallop. No murmur heard.  Pulmonary/Chest: Effort normal and breath sounds normal. No respiratory distress. He has no wheezes. He has no rales.   Abdominal: Soft. Bowel sounds are normal. He exhibits no distension and no mass.There is no tenderness, no rebound and no guarding.   Musculoskeletal: Normal range of motion.         General: No tenderness, deformity or edema.   Lymphadenopathy: He has no cervical adenopathy.   Neurological: He is alert and oriented to person, place, and time. No cranial nerve deficit. Gait normal. Coordination normal. GCS score is 15.   Skin: Skin is warm and dry. No rash noted. He is not diaphoretic. No erythema. No pallor.         Assessment/Plan     * Acute pancreatitis  Assessment & Plan  74-year-old male admitted with history  of alcohol abuse and epigastric pain, nausea, vomiting, diarrhea from 2 weeks and with elevated lipase in the 1900s  CT abdomen without contrast from outside facility showed small volume high density sludge and small stone in gallbladder fundus without thickening, visualized calcified CBD stone  Blood cultures done at [facility] pending.  Called the hospital and they are not ready yet.  I will call again tomorrow  Got IV Zosyn and vancomycin at that hospital  Lactic 8.5-> 5.6->1.9  Anion gap of 20 on admission likely related to lactic acidosis/ starvation/ alcoholic   MRI showed acute  pancreatitis, cholelithiasis with biliary dilatation without CBD stone, diffuse hepatocellular disease?  Cirrhosis and small amount of ascites.  Called [facility] for Blood cultures told NGTD.   Plan  Decrease IV fluid LR 83 mL/h  IV morphine for pain control  Continue IV Zosyn  Replete electrolytes  Diet follow Speech recommendations with Cortrak feeding is tolerated 50 mL/h tube feed  Appreciate surgery recommendations.    Acute kidney injury (SARAH) with acute tubular necrosis (ATN) (HCC)  Assessment & Plan  BUN/Cr 65/2.32 on admission  Patient's baseline creatinine is unknown.  Patient states he did not see his PCP Dr. Sol from last 3 years  Called the [facility] for records said they sent all the records want to have from admission.  Plan  Continue IV fluid  Avoid nephrotoxic medication  Down trending, continue to monitor     Alcohol abuse  Assessment & Plan  Patient with h/o alcohol abuse and developed acute pancreatitis.  PO thiamine.    Pancytopenia (HCC)  Assessment & Plan  Patient admitted for acute pancreatitis secondary to ?alcohol labs showed pancytopenia   Plan monitor labs closely  CBC with differential daily  For type and crossmatch  Transfuse if necessary    Lactic acidosis  Assessment & Plan  Per outside hospital record, lactic was elevated 8.5 -> 5.6 at outside hospital  On admission lactic was 1.9  resolved      Cholelithiasis  Assessment & Plan  As seen on CT abdomen from outside hospital  MRI showed acute pancreatitis, cholelithiasis with biliary dilatation without CBD stone, diffuse hepatocellular disease?  Cirrhosis and small amount of ascites.    Follow surgery recommendations Will not recommend cholecystectomy discussed this with the patient and his son  Could consider ERCP with sphincterotomy to try to decrease recurrence.  Appreciates surgery recommendations.      Electrolyte abnormality  Assessment & Plan  Hypocalcemia, hypomagnesemia, hypokalemia    Plan  Replete  as needed      Elevated troponin  Assessment & Plan  36->40 on admission  Likely demand ischemia  No EKG changes  Echo:Left ventricular ejection fraction is visually estimated to be 55%.  Calcified aortic valve leaflets.  Mild mitral annular calcification.  Mild tricuspid regurgitation.  Right ventricular systolic pressure is estimated to be 40 mmHg.  Dilated inferior vena cava with inspiratory collapse.  Plan  Fluid resuscitate   telemonitor

## 2020-01-09 NOTE — CARE PLAN
Problem: Pain Management  Goal: Pain level will decrease to patient's comfort goal  Outcome: PROGRESSING AS EXPECTED  Patient stated he was having 9/10 pain in his back. Pain medication was provided per MAR. When reassessed, patient stated pain 3/10 and declined intervention. Will continue to assess through shift.      Problem: Skin Integrity  Goal: Risk for impaired skin integrity will decrease  Outcome: PROGRESSING AS EXPECTED  Patients sacrum is red and slow to henrry. He has a waffle mattress. Was educated on the importance of turning and can turn himself side to side. Barrier cream at bedside. Will continue to monitor.

## 2020-01-09 NOTE — DOCUMENTATION QUERY
[facility]                                                                       Query Response Note      PATIENT:               JOSHUA ZEPEDA  ACCT #:                  [id number]  MRN:                     [id number]  :                      1945  ADMIT DATE:       2020 8:38 PM  DISCH DATE:          RESPONDING  PROVIDER #:        [id number]           QUERY TEXT:    On 20, Dietitian notes: Pt with severe malnutrition in the context of acute injury related to diminished PO intake due to pancreatitis,  and presence of muscle loss noted in temple region.  Please document your agreement with the dietary assessment of severe malnutrition.    Note: If an appropriate response is not listed below, please respond with a new note.    The patient's Clinical Indicators include:  Noted:  BMI 16.39 (<19)  Underweight  Starvation  muscle & fat loss  Cortrak- tube feedings  Dietary evaluation/monitoring/treatment  SLP evaluation/monitoring/treatment  Options provided:   -- Agree with dietary assessment of severe malnutrition   -- Disagree with dietary assessment of severe malnutrition   -- Unable to determine      Query created by: Stella Ohara on 2020 4:11 PM    RESPONSE TEXT:    Agree with dietary assessment of severe malnutrition          Electronically signed by:  VIV LARA MD 2020 11:39 AM

## 2020-01-09 NOTE — THERAPY
"Pt w/ hx of ETOH.  Admitted w/ c/o N&V, diarrhea and recent 20 lb weight loss.  He presents in bed, agreeable to work w/ PT.  He does need min assist to stand and min assist to ambulate, w/ heavy use of the fww.  He did not use an AD PTA.  He demonstrates generalized weakness and fatigue far greater than the activity performed.  PT will follow and address goals to improve functional indep  as he would like to return home.    Physical Therapy Evaluation completed.   Bed Mobility:  Supine to Sit: Supervised  Transfers: Sit to Stand: Minimal Assist  Gait: Level Of Assist: Minimal Assist with Front-Wheel Walker     73  Plan of Care: Will benefit from Physical Therapy 3 times per week  Discharge Recommendations: Equipment: Will Continue to Assess for Equipment Needs. Post-acute therapy   Recommend post-acute placement for continued physical therapy services prior to discharge home. Patient can tolerate post-acute therapies at a 5x/week frequency.     See \"Rehab Therapy-Acute\" Patient Summary Report for complete documentation.     "

## 2020-01-10 PROBLEM — E87.20 LACTIC ACIDOSIS: Status: RESOLVED | Noted: 2020-01-06 | Resolved: 2020-01-10

## 2020-01-10 PROBLEM — R79.89 ELEVATED TROPONIN: Status: RESOLVED | Noted: 2020-01-06 | Resolved: 2020-01-10

## 2020-01-10 PROBLEM — E87.8 ELECTROLYTE ABNORMALITY: Status: RESOLVED | Noted: 2020-01-06 | Resolved: 2020-01-10

## 2020-01-10 PROBLEM — N17.0 ACUTE KIDNEY INJURY (AKI) WITH ACUTE TUBULAR NECROSIS (ATN) (HCC): Status: RESOLVED | Noted: 2020-01-06 | Resolved: 2020-01-10

## 2020-01-10 LAB
ALBUMIN SERPL BCP-MCNC: 2.4 G/DL (ref 3.2–4.9)
ALBUMIN/GLOB SERPL: 1 G/DL
ALP SERPL-CCNC: 124 U/L (ref 30–99)
ALT SERPL-CCNC: 38 U/L (ref 2–50)
ANION GAP SERPL CALC-SCNC: 10 MMOL/L (ref 0–11.9)
AST SERPL-CCNC: 50 U/L (ref 12–45)
BASOPHILS # BLD AUTO: 0.8 % (ref 0–1.8)
BASOPHILS # BLD: 0.03 K/UL (ref 0–0.12)
BILIRUB SERPL-MCNC: 0.5 MG/DL (ref 0.1–1.5)
BUN SERPL-MCNC: 27 MG/DL (ref 8–22)
CALCIUM SERPL-MCNC: 7.5 MG/DL (ref 8.5–10.5)
CHLORIDE SERPL-SCNC: 111 MMOL/L (ref 96–112)
CO2 SERPL-SCNC: 24 MMOL/L (ref 20–33)
CREAT SERPL-MCNC: 1.15 MG/DL (ref 0.5–1.4)
EOSINOPHIL # BLD AUTO: 0.03 K/UL (ref 0–0.51)
EOSINOPHIL NFR BLD: 0.8 % (ref 0–6.9)
ERYTHROCYTE [DISTWIDTH] IN BLOOD BY AUTOMATED COUNT: 57.2 FL (ref 35.9–50)
GLOBULIN SER CALC-MCNC: 2.4 G/DL (ref 1.9–3.5)
GLUCOSE SERPL-MCNC: 133 MG/DL (ref 65–99)
HCT VFR BLD AUTO: 24.1 % (ref 42–52)
HGB BLD-MCNC: 7.9 G/DL (ref 14–18)
IMM GRANULOCYTES # BLD AUTO: 0.02 K/UL (ref 0–0.11)
IMM GRANULOCYTES NFR BLD AUTO: 0.5 % (ref 0–0.9)
LYMPHOCYTES # BLD AUTO: 1.35 K/UL (ref 1–4.8)
LYMPHOCYTES NFR BLD: 34.3 % (ref 22–41)
MAGNESIUM SERPL-MCNC: 1.8 MG/DL (ref 1.5–2.5)
MCH RBC QN AUTO: 33.3 PG (ref 27–33)
MCHC RBC AUTO-ENTMCNC: 32.8 G/DL (ref 33.7–35.3)
MCV RBC AUTO: 101.7 FL (ref 81.4–97.8)
MONOCYTES # BLD AUTO: 0.77 K/UL (ref 0–0.85)
MONOCYTES NFR BLD AUTO: 19.5 % (ref 0–13.4)
NEUTROPHILS # BLD AUTO: 1.74 K/UL (ref 1.82–7.42)
NEUTROPHILS NFR BLD: 44.1 % (ref 44–72)
NRBC # BLD AUTO: 0.04 K/UL
NRBC BLD-RTO: 1 /100 WBC
PHOSPHATE SERPL-MCNC: 3.3 MG/DL (ref 2.5–4.5)
PLATELET # BLD AUTO: 50 K/UL (ref 164–446)
PMV BLD AUTO: 11.7 FL (ref 9–12.9)
POTASSIUM SERPL-SCNC: 3.8 MMOL/L (ref 3.6–5.5)
PROCALCITONIN SERPL-MCNC: 0.28 NG/ML
PROT SERPL-MCNC: 4.8 G/DL (ref 6–8.2)
RBC # BLD AUTO: 2.37 M/UL (ref 4.7–6.1)
SODIUM SERPL-SCNC: 145 MMOL/L (ref 135–145)
WBC # BLD AUTO: 3.9 K/UL (ref 4.8–10.8)

## 2020-01-10 PROCEDURE — A9270 NON-COVERED ITEM OR SERVICE: HCPCS | Performed by: INTERNAL MEDICINE

## 2020-01-10 PROCEDURE — 700105 HCHG RX REV CODE 258: Performed by: STUDENT IN AN ORGANIZED HEALTH CARE EDUCATION/TRAINING PROGRAM

## 2020-01-10 PROCEDURE — 700102 HCHG RX REV CODE 250 W/ 637 OVERRIDE(OP): Performed by: STUDENT IN AN ORGANIZED HEALTH CARE EDUCATION/TRAINING PROGRAM

## 2020-01-10 PROCEDURE — 85025 COMPLETE CBC W/AUTO DIFF WBC: CPT

## 2020-01-10 PROCEDURE — A9270 NON-COVERED ITEM OR SERVICE: HCPCS | Performed by: STUDENT IN AN ORGANIZED HEALTH CARE EDUCATION/TRAINING PROGRAM

## 2020-01-10 PROCEDURE — 36415 COLL VENOUS BLD VENIPUNCTURE: CPT

## 2020-01-10 PROCEDURE — 83735 ASSAY OF MAGNESIUM: CPT

## 2020-01-10 PROCEDURE — 770006 HCHG ROOM/CARE - MED/SURG/GYN SEMI*

## 2020-01-10 PROCEDURE — 700101 HCHG RX REV CODE 250: Performed by: STUDENT IN AN ORGANIZED HEALTH CARE EDUCATION/TRAINING PROGRAM

## 2020-01-10 PROCEDURE — 700102 HCHG RX REV CODE 250 W/ 637 OVERRIDE(OP): Performed by: INTERNAL MEDICINE

## 2020-01-10 PROCEDURE — 700111 HCHG RX REV CODE 636 W/ 250 OVERRIDE (IP): Performed by: STUDENT IN AN ORGANIZED HEALTH CARE EDUCATION/TRAINING PROGRAM

## 2020-01-10 PROCEDURE — 99232 SBSQ HOSP IP/OBS MODERATE 35: CPT | Mod: GC | Performed by: INTERNAL MEDICINE

## 2020-01-10 PROCEDURE — 84100 ASSAY OF PHOSPHORUS: CPT

## 2020-01-10 PROCEDURE — 80053 COMPREHEN METABOLIC PANEL: CPT

## 2020-01-10 PROCEDURE — 84145 PROCALCITONIN (PCT): CPT

## 2020-01-10 RX ORDER — ACETAMINOPHEN 500 MG
1000 TABLET ORAL 3 TIMES DAILY
Status: DISCONTINUED | OUTPATIENT
Start: 2020-01-10 | End: 2020-01-15 | Stop reason: HOSPADM

## 2020-01-10 RX ORDER — LIDOCAINE 50 MG/G
1 PATCH TOPICAL EVERY 24 HOURS
Status: DISCONTINUED | OUTPATIENT
Start: 2020-01-10 | End: 2020-01-15 | Stop reason: HOSPADM

## 2020-01-10 RX ORDER — MAGNESIUM SULFATE HEPTAHYDRATE 40 MG/ML
2 INJECTION, SOLUTION INTRAVENOUS ONCE
Status: COMPLETED | OUTPATIENT
Start: 2020-01-10 | End: 2020-01-10

## 2020-01-10 RX ORDER — TRAZODONE HYDROCHLORIDE 50 MG/1
50 TABLET ORAL
Status: DISCONTINUED | OUTPATIENT
Start: 2020-01-10 | End: 2020-01-13

## 2020-01-10 RX ADMIN — PIPERACILLIN AND TAZOBACTAM 4.5 G: 4; .5 INJECTION, POWDER, LYOPHILIZED, FOR SOLUTION INTRAVENOUS; PARENTERAL at 04:40

## 2020-01-10 RX ADMIN — LIDOCAINE 1 PATCH: 50 PATCH TOPICAL at 09:06

## 2020-01-10 RX ADMIN — HEPARIN SODIUM 5000 UNITS: 5000 INJECTION, SOLUTION INTRAVENOUS; SUBCUTANEOUS at 04:38

## 2020-01-10 RX ADMIN — ACETAMINOPHEN 1000 MG: 500 TABLET, FILM COATED ORAL at 12:00

## 2020-01-10 RX ADMIN — MORPHINE SULFATE 1 MG: 4 INJECTION INTRAVENOUS at 21:56

## 2020-01-10 RX ADMIN — ACETAMINOPHEN 650 MG: 325 TABLET, FILM COATED ORAL at 02:29

## 2020-01-10 RX ADMIN — OMEPRAZOLE 40 MG: KIT at 06:03

## 2020-01-10 RX ADMIN — HEPARIN SODIUM 5000 UNITS: 5000 INJECTION, SOLUTION INTRAVENOUS; SUBCUTANEOUS at 17:47

## 2020-01-10 RX ADMIN — MAGNESIUM SULFATE IN WATER 2 G: 40 INJECTION, SOLUTION INTRAVENOUS at 06:25

## 2020-01-10 RX ADMIN — OMEPRAZOLE 40 MG: KIT at 17:47

## 2020-01-10 RX ADMIN — TRAZODONE HYDROCHLORIDE 50 MG: 50 TABLET ORAL at 21:39

## 2020-01-10 RX ADMIN — ACETAMINOPHEN 1000 MG: 500 TABLET, FILM COATED ORAL at 17:48

## 2020-01-10 NOTE — PROGRESS NOTES
Report received. Patient A&O x 4. Reports pain 7/10 in back.  VS Stable. POC discussed, patient verbalized understanding. Belongings and bedside table within reach. Bed in low and locked positions. Fall precautions in place. Patient educated to call for assistance. Hourly rounding in place. No other needs at this time.

## 2020-01-10 NOTE — PROGRESS NOTES
Internal Medicine Interval Note  Note Author: Chas Pastor M.D.     Name Slava Lees 1945   Age/Sex 74 y.o. male   MRN [id number]   Code Status DNAR/DNI      After 5PM or if no immediate response to page, please call for cross-coverage  Attending/Team: Dr Pineda/Shyam See Patient List for primary contact information  Call [phone] to page    1st Call - Day Intern (R1):   Dr Alejandre 2nd Call - Day Sr. Resident (R2/R3):   Dr Russo            Reason for interval visit  (Principal Problem)   Acute pancreatitis, SARAH, anemia      Interval Problem Daily Status Update  (24 hours, problem oriented, brief subjective history, new lab/imaging data pertinent to that problem)   No over night events. Patient reporting back pain today which he associates to layng in bed. No ABD pain, N/V/D. SARAH resolved and Pro-sarah trending down. Still with NGT and tube feeds.     Review of Systems   Constitutional: Positive for weight loss. Negative for chills, fever and malaise/fatigue.   HENT: Negative for ear discharge, ear pain, hearing loss, sore throat and tinnitus.    Eyes: Negative for blurred vision, double vision, photophobia, pain and redness.   Respiratory: Negative for cough, hemoptysis, sputum production, shortness of breath, wheezing and stridor.    Cardiovascular: Negative for chest pain, palpitations, orthopnea, claudication and leg swelling.   Gastrointestinal: Negative for  abdominal pain, constipation, diarrhea, heartburn, nausea and vomiting.   Genitourinary: Negative for dysuria, flank pain, frequency, hematuria and urgency.   Musculoskeletal: Positive for back pain. Negative for joint pain, myalgias and neck pain.   Skin: Negative for itching and rash.   Neurological: Positive for weakness. Negative for dizziness, tingling, tremors, sensory change and headaches.   Endo/Heme/Allergies: Negative for polydipsia. Does not bruise/bleed easily.     Disposition/Barriers to discharge:    Rehab    Consultants/Specialty  General surgery    PCP: No primary care provider on file.      Quality Measures  Quality-Core Measures   Reviewed items::  Labs reviewed, Medications reviewed and Radiology images reviewed  DVT prophylaxis - SCD's and Heparin.  Antibiotics:  Treating active infection/contamination beyond 24 hours perioperative coverage      Physical Exam       Vitals:    01/09/20 2014 01/10/20 0012 01/10/20 0512 01/10/20 0814   BP: 123/75 (!) 96/64 104/71 119/75   Pulse: 94 88 94 87   Resp: 18 18 18 18   Temp: 36.7 °C (98 °F) 36.5 °C (97.7 °F) 36.7 °C (98.1 °F) 36.3 °C (97.3 °F)   TempSrc: Temporal Temporal Temporal Temporal   SpO2: 96% 95% 95% 95%   Weight: 63.9 kg (140 lb 14 oz)      Height:         Body mass index is 19.11 kg/m². Weight: 63.9 kg (140 lb 14 oz)  Oxygen Therapy:  Pulse Oximetry: 95 %, O2 (LPM): 0, O2 Delivery: None (Room Air)    Physical Exam   Constitutional: He is oriented to person, place, and time and well-developed, well-nourished, and in no distress. No distress.   HENT:   Head: Normocephalic and atraumatic.   Right Ear: External ear normal.   Left Ear: External ear normal.   Eyes: Pupils are equal, round, and reactive to light. Conjunctivae and EOM are normal. Right eye exhibits no discharge. No scleral icterus.   Neck: Normal range of motion. Neck supple. No tracheal deviation present. No thyromegaly present.   Cardiovascular: Normal rate, regular rhythm and normal heart sounds. Exam reveals no gallop. No murmur heard.  Pulmonary/Chest: Effort normal and breath sounds normal. No respiratory distress. He has no wheezes. He has no rales.   Abdominal: Soft. Bowel sounds are normal. He exhibits no distension and no mass.There is no tenderness, no rebound and no guarding.   Musculoskeletal: Normal range of motion.         General: No tenderness, deformity or edema.   Lymphadenopathy: He has no cervical adenopathy.   Neurological: He is alert and oriented to person, place, and  time. No cranial nerve deficit. Gait normal. Coordination normal. GCS score is 15.   Skin: Skin is warm and dry. No rash noted. He is not diaphoretic. No erythema. No pallor.         Assessment/Plan     * Acute pancreatitis  Assessment & Plan  74-year-old male admitted with history  of alcohol abuse and epigastric pain, nausea, vomiting, diarrhea from 2 weeks and with elevated lipase in the 1900s  CT abdomen without contrast from outside facility showed small volume high density sludge and small stone in gallbladder fundus without thickening, visualized calcified CBD stone  Blood cultures done at [facility] pending.  Called the hospital and they are not ready yet.  I will call again tomorrow  Got IV Zosyn and vancomycin at that hospital  Lactic 8.5-> 5.6->1.9  Anion gap of 20 on admission likely related to lactic acidosis/ starvation/ alcoholic   MRI showed acute pancreatitis, cholelithiasis with biliary dilatation without CBD stone, diffuse hepatocellular disease?  Cirrhosis and small amount of ascites.  Called [facility] for Blood cultures told NGTD.   Plan  Decrease IV fluid LR 83 mL/h  DC IV Zosyn  Replete electrolytes  Transfer to medical floor  Diet follow Speech recommendations with Cortrak feeding is tolerated 50 mL/h tube feed  Consult nutrition  Appreciate surgery recommendations  Pending rehab evaluation for DC to rehab    Alcohol abuse  Assessment & Plan  Patient with h/o alcohol abuse and developed acute pancreatitis.  PO thiamine    Pancytopenia (HCC)  Assessment & Plan  Patient admitted for acute pancreatitis secondary to ?alcohol labs showed pancytopenia   Plan monitor labs closely  CBC with differential daily  For type and crossmatch  Transfuse if necessary    Cholelithiasis  Assessment & Plan  As seen on CT abdomen from outside hospital  MRI showed acute pancreatitis, cholelithiasis with biliary dilatation without CBD stone, diffuse hepatocellular disease?  Cirrhosis and  small amount of ascites.  As per Surg, Hold off on surgery for now, pt needs to get stronger  Pending Rehab evaluation    Electrolyte abnormality  Assessment & Plan  Replete as needed

## 2020-01-10 NOTE — CARE PLAN
Problem: Safety  Goal: Will remain free from falls  Outcome: PROGRESSING AS EXPECTED  Note:   Patient will remain free from falls. Patient educated on importance of calling for assistance when assistance needed. Patient educated on tread socks, belongings within reach, and use of call light. Fall risk wristband on, bed alarm on. Bed in low and locked position. Fall precautions in place.       Problem: Infection  Goal: Will remain free from infection  Outcome: PROGRESSING AS EXPECTED  Note:   Patient will remain free from infection to prevent further complications. Patient educated on the importance of routine hand hygiene to help prevent the spread of infection.

## 2020-01-10 NOTE — DISCHARGE PLANNING
Anticipated Discharge Disposition: SNF vs acute rehab     Action: RN CM met with patient to discuss discharge planning as Eunice from [facility] acute rehab requesting details regarding patient's discharge plan and support. Per patient, both of his sons live nearby and are available for support if needed. Patient gave permission for this RN CJ to call son on facesheet.  RN CM called and left voicemail for Shell requesting return call.    Barriers to Discharge: placement pending medical clearance    Plan: Case coordination to f/u with family for support, IRF for referral    Care Transition Team Assessment     RN CM met with patient at bedside for assessment. Patient lethargic during conversation, but able to answer that he lives in a 2 story house and does live on the second story. Patient was independent with ADL's and IADL's prior to admit, driving self to appointments.   Patient reports using no DME at home.  He states that he has 2 sons that live close to him and are available for support if needed.   Anticipate placement prior to returning home at discharge.    Information Source  Orientation : Oriented x 4  Information Given By: Patient    Readmission Evaluation  Is this a readmission?: No    Elopement Risk  Legal Hold: No  Ambulatory or Self Mobile in Wheelchair: No-Not an Elopement Risk  Disoriented: No  Psychiatric Symptoms: None  History of Wandering: No  Elopement this Admit: No  Vocalizing Wanting to Leave: No  Displays Behaviors, Body Language Wanting to Leave: No-Not at Risk for Elopement  Elopement Risk: Not at Risk for Elopement    Interdisciplinary Discharge Planning  Does Admitting Nurse Feel This Could be a Complex Discharge?: Yes  Lives with - Patient's Self Care Capacity: Alone and Able to Care For Self  Patient or legal guardian wants to designate a caregiver (see row info): Yes  Caregiver name: Diana Yoana  Caregiver relationship to patient: son  Caregiver contact info: on fill  ([facility])  Authorization for Release of Health Information has been completed: Yes  Support Systems: Family Member(s)  Housing / Facility: 2 Story House  Do You Take your Prescribed Medications Regularly: No  Able to Return to Previous ADL's: Future Time w/Therapy  Mobility Issues: No  Prior Services: Home-Independent  Patient Expects to be Discharged to:: home  Assistance Needed: Yes  Durable Medical Equipment: Not Applicable    Discharge Preparedness  What is your plan after discharge?: Uncertain - pending medical team collaboration  What are your discharge supports?: Child  Prior Functional Level: Ambulatory, Drives Self, Independent with Activities of Daily Living, Independent with Medication Management    Functional Assesment  Prior Functional Level: Ambulatory, Drives Self, Independent with Activities of Daily Living, Independent with Medication Management    Finances  Financial Barriers to Discharge: No  Prescription Coverage: Yes    Vision / Hearing Impairment  Vision Impairment : Yes(Reading only)  Right Eye Vision: Impaired, Wears Glasses  Left Eye Vision: Impaired, Wears Glasses  Hearing Impairment : No    Domestic Abuse  Have you ever been the victim of abuse or violence?: No  Physical Abuse or Sexual Abuse: No  Verbal Abuse or Emotional Abuse: No  Possible Abuse Reported to:: Not Applicable    Discharge Risks or Barriers  Discharge risks or barriers?: Complex medical needs, Lives alone, no community support    Anticipated Discharge Information  Anticipated discharge disposition: SNF, Acute rehab  Discharge Contact Phone Number: [phone]

## 2020-01-10 NOTE — DISCHARGE PLANNING
[facility] Transitional Care Coordination     Referral from:  Dr. Pastor    Facesheet indicates: Medicare Insurance    Potential Rehab Diagnosis: Debility - Acute Pancreatitis    Chart review indicates patient has on going medical management and  therapy needs to possibly meet inpatient rehab facility criteria.    D/C support: Limited discharge support/resourses to facilitate independent return to community     Physiatry consultation pended per protocol.      Waiting on additional information to determine appropriateness for acute inpatient rehab. Will follow.   Would appreciate clarification of discharge support to facilitate safe return to community.  CJ espinal.      Thank you for the referral.

## 2020-01-10 NOTE — PROGRESS NOTES
Assumed care of patient, bedside report received from WALTER Loaiza. Updated on POC, call light within reach and fall precautions in place. Bed locked and in lowest position. Patient instructed to call for assistance before getting out of bed. All questions answered, no other needs at this time.

## 2020-01-11 LAB
ALBUMIN SERPL BCP-MCNC: 2.9 G/DL (ref 3.2–4.9)
ALBUMIN/GLOB SERPL: 1 G/DL
ALP SERPL-CCNC: 120 U/L (ref 30–99)
ALT SERPL-CCNC: 38 U/L (ref 2–50)
ANION GAP SERPL CALC-SCNC: 10 MMOL/L (ref 0–11.9)
AST SERPL-CCNC: 53 U/L (ref 12–45)
BASOPHILS # BLD AUTO: 1.1 % (ref 0–1.8)
BASOPHILS # BLD: 0.07 K/UL (ref 0–0.12)
BILIRUB SERPL-MCNC: 0.7 MG/DL (ref 0.1–1.5)
BUN SERPL-MCNC: 25 MG/DL (ref 8–22)
CALCIUM SERPL-MCNC: 8.2 MG/DL (ref 8.5–10.5)
CHLORIDE SERPL-SCNC: 110 MMOL/L (ref 96–112)
CO2 SERPL-SCNC: 23 MMOL/L (ref 20–33)
CREAT SERPL-MCNC: 1.08 MG/DL (ref 0.5–1.4)
EOSINOPHIL # BLD AUTO: 0.08 K/UL (ref 0–0.51)
EOSINOPHIL NFR BLD: 1.3 % (ref 0–6.9)
ERYTHROCYTE [DISTWIDTH] IN BLOOD BY AUTOMATED COUNT: 59.2 FL (ref 35.9–50)
GLOBULIN SER CALC-MCNC: 2.8 G/DL (ref 1.9–3.5)
GLUCOSE BLD-MCNC: 133 MG/DL (ref 65–99)
GLUCOSE SERPL-MCNC: 117 MG/DL (ref 65–99)
HCT VFR BLD AUTO: 27.3 % (ref 42–52)
HGB BLD-MCNC: 8.8 G/DL (ref 14–18)
IMM GRANULOCYTES # BLD AUTO: 0.02 K/UL (ref 0–0.11)
IMM GRANULOCYTES NFR BLD AUTO: 0.3 % (ref 0–0.9)
LYMPHOCYTES # BLD AUTO: 2.01 K/UL (ref 1–4.8)
LYMPHOCYTES NFR BLD: 32.1 % (ref 22–41)
MAGNESIUM SERPL-MCNC: 1.9 MG/DL (ref 1.5–2.5)
MCH RBC QN AUTO: 33.5 PG (ref 27–33)
MCHC RBC AUTO-ENTMCNC: 32.2 G/DL (ref 33.7–35.3)
MCV RBC AUTO: 103.8 FL (ref 81.4–97.8)
MONOCYTES # BLD AUTO: 1.11 K/UL (ref 0–0.85)
MONOCYTES NFR BLD AUTO: 17.7 % (ref 0–13.4)
NEUTROPHILS # BLD AUTO: 2.97 K/UL (ref 1.82–7.42)
NEUTROPHILS NFR BLD: 47.5 % (ref 44–72)
NRBC # BLD AUTO: 0.03 K/UL
NRBC BLD-RTO: 0.5 /100 WBC
PHOSPHATE SERPL-MCNC: 2.8 MG/DL (ref 2.5–4.5)
PLATELET # BLD AUTO: 67 K/UL (ref 164–446)
PMV BLD AUTO: 11.3 FL (ref 9–12.9)
POTASSIUM SERPL-SCNC: 3.7 MMOL/L (ref 3.6–5.5)
PROT SERPL-MCNC: 5.7 G/DL (ref 6–8.2)
RBC # BLD AUTO: 2.63 M/UL (ref 4.7–6.1)
SODIUM SERPL-SCNC: 143 MMOL/L (ref 135–145)
WBC # BLD AUTO: 6.3 K/UL (ref 4.8–10.8)

## 2020-01-11 PROCEDURE — 700102 HCHG RX REV CODE 250 W/ 637 OVERRIDE(OP): Performed by: STUDENT IN AN ORGANIZED HEALTH CARE EDUCATION/TRAINING PROGRAM

## 2020-01-11 PROCEDURE — 700102 HCHG RX REV CODE 250 W/ 637 OVERRIDE(OP): Performed by: INTERNAL MEDICINE

## 2020-01-11 PROCEDURE — 700105 HCHG RX REV CODE 258: Performed by: STUDENT IN AN ORGANIZED HEALTH CARE EDUCATION/TRAINING PROGRAM

## 2020-01-11 PROCEDURE — A9270 NON-COVERED ITEM OR SERVICE: HCPCS | Performed by: INTERNAL MEDICINE

## 2020-01-11 PROCEDURE — 700101 HCHG RX REV CODE 250: Performed by: STUDENT IN AN ORGANIZED HEALTH CARE EDUCATION/TRAINING PROGRAM

## 2020-01-11 PROCEDURE — 83735 ASSAY OF MAGNESIUM: CPT

## 2020-01-11 PROCEDURE — 302152 K-PAD 12X17: Performed by: INTERNAL MEDICINE

## 2020-01-11 PROCEDURE — 700111 HCHG RX REV CODE 636 W/ 250 OVERRIDE (IP): Performed by: STUDENT IN AN ORGANIZED HEALTH CARE EDUCATION/TRAINING PROGRAM

## 2020-01-11 PROCEDURE — A9270 NON-COVERED ITEM OR SERVICE: HCPCS | Performed by: STUDENT IN AN ORGANIZED HEALTH CARE EDUCATION/TRAINING PROGRAM

## 2020-01-11 PROCEDURE — 92526 ORAL FUNCTION THERAPY: CPT

## 2020-01-11 PROCEDURE — 82962 GLUCOSE BLOOD TEST: CPT

## 2020-01-11 PROCEDURE — 80053 COMPREHEN METABOLIC PANEL: CPT

## 2020-01-11 PROCEDURE — 302131 K PAD MOTOR: Performed by: INTERNAL MEDICINE

## 2020-01-11 PROCEDURE — 85025 COMPLETE CBC W/AUTO DIFF WBC: CPT

## 2020-01-11 PROCEDURE — 36415 COLL VENOUS BLD VENIPUNCTURE: CPT

## 2020-01-11 PROCEDURE — 99232 SBSQ HOSP IP/OBS MODERATE 35: CPT | Mod: GC | Performed by: INTERNAL MEDICINE

## 2020-01-11 PROCEDURE — 770006 HCHG ROOM/CARE - MED/SURG/GYN SEMI*

## 2020-01-11 PROCEDURE — 84100 ASSAY OF PHOSPHORUS: CPT

## 2020-01-11 RX ORDER — POTASSIUM CHLORIDE 20 MEQ/1
40 TABLET, EXTENDED RELEASE ORAL ONCE
Status: COMPLETED | OUTPATIENT
Start: 2020-01-11 | End: 2020-01-11

## 2020-01-11 RX ORDER — IBUPROFEN 600 MG/1
600 TABLET ORAL ONCE
Status: COMPLETED | OUTPATIENT
Start: 2020-01-11 | End: 2020-01-11

## 2020-01-11 RX ORDER — MAGNESIUM SULFATE HEPTAHYDRATE 40 MG/ML
2 INJECTION, SOLUTION INTRAVENOUS ONCE
Status: COMPLETED | OUTPATIENT
Start: 2020-01-11 | End: 2020-01-11

## 2020-01-11 RX ADMIN — LIDOCAINE 1 PATCH: 50 PATCH TOPICAL at 03:04

## 2020-01-11 RX ADMIN — POTASSIUM CHLORIDE 40 MEQ: 1500 TABLET, EXTENDED RELEASE ORAL at 08:33

## 2020-01-11 RX ADMIN — SODIUM CHLORIDE, POTASSIUM CHLORIDE, SODIUM LACTATE AND CALCIUM CHLORIDE: 600; 310; 30; 20 INJECTION, SOLUTION INTRAVENOUS at 05:29

## 2020-01-11 RX ADMIN — MORPHINE SULFATE 1 MG: 4 INJECTION INTRAVENOUS at 02:21

## 2020-01-11 RX ADMIN — TRAZODONE HYDROCHLORIDE 50 MG: 50 TABLET ORAL at 20:41

## 2020-01-11 RX ADMIN — ACETAMINOPHEN 1000 MG: 500 TABLET, FILM COATED ORAL at 18:13

## 2020-01-11 RX ADMIN — IBUPROFEN 600 MG: 600 TABLET ORAL at 03:04

## 2020-01-11 RX ADMIN — HEPARIN SODIUM 5000 UNITS: 5000 INJECTION, SOLUTION INTRAVENOUS; SUBCUTANEOUS at 18:13

## 2020-01-11 RX ADMIN — ACETAMINOPHEN 1000 MG: 500 TABLET, FILM COATED ORAL at 05:18

## 2020-01-11 RX ADMIN — OMEPRAZOLE 40 MG: KIT at 18:13

## 2020-01-11 RX ADMIN — SODIUM CHLORIDE, POTASSIUM CHLORIDE, SODIUM LACTATE AND CALCIUM CHLORIDE: 600; 310; 30; 20 INJECTION, SOLUTION INTRAVENOUS at 20:43

## 2020-01-11 RX ADMIN — MAGNESIUM SULFATE 2 G: 2 INJECTION INTRAVENOUS at 08:33

## 2020-01-11 RX ADMIN — ACETAMINOPHEN 1000 MG: 500 TABLET, FILM COATED ORAL at 11:32

## 2020-01-11 RX ADMIN — OMEPRAZOLE 40 MG: KIT at 05:18

## 2020-01-11 RX ADMIN — MORPHINE SULFATE 1 MG: 4 INJECTION INTRAVENOUS at 23:32

## 2020-01-11 NOTE — PROGRESS NOTES
Report received from Tele 8 nurseTressa. Pt escorted to unit via hospital bed escorted by transport. Pt is A&ox4, on room air. Upon arrival tube feed not connected, IV saline locked. Pt educated on plan of care and all questions answered. Pt connected to new tube feed, and IV fluids. Pt educated on the need to remain at 30 degrees while continuous tube feed runs. Pt assisted to restroom with 1 assist, FWW. Pt educated on HIGH fall risk - bed alarm is on, bed is in low and locked position, lines connected to IV pole on same side of restroom, spill/clutter free environment. RN/CNA extensions provided, pt appropriately demonstrates how to utilize call light

## 2020-01-11 NOTE — DISCHARGE PLANNING
[facility] Transitional [facility] Coordination     Aware of repeat PMR referral from Dr. Alejandre. Awaitng verification of discharge support to facilitate safe return to community.  Physiatry consult pended per protocol. TCC remains following.     Thank you for the referral.

## 2020-01-11 NOTE — PROGRESS NOTES
Speech therapy paged for re-eval per MD request. Pt last seen by SLP 1/7    Return phone call from speech received, will see pt today if time allows after other evals. Pt on SLP list to see.

## 2020-01-11 NOTE — CARE PLAN
Problem: Safety  Goal: Will remain free from falls  Outcome: PROGRESSING AS EXPECTED  Pt educated on HIGH fall risk - bed in low and locked position, call light/belongings within reach, bed alarm on, spill/clutter free environment, all lines secured to IV pole on the same side of the bed as the restroom, FWW out of sight     Problem: Bowel/Gastric:  Goal: Normal bowel function is maintained or improved  Outcome: PROGRESSING SLOWER THAN EXPECTED  Patient with multiple loose BMs

## 2020-01-11 NOTE — DIETARY
Nutrition Services:    Received consult for FTT. Pt is currently on TF meeting 100% of estimated nutrition needs.     Pt meets criteria for severe malnutrition per RD evaluation 1/7/20: Pt with severe malnutrition in the context of acute injury related to diminished PO intake due to pancreatitis dx AEB report of PO intake < 50% of normal intake x a couple of weeks (2-3 weeks) and presence of  muscle loss noted in his temple region and fat loss noted in his orbital region with pronounced zygomatic arch noted.    RD following

## 2020-01-11 NOTE — PROGRESS NOTES
Patient report given via phone to nurse taking over for patient in [place] [address].  Patient sent with chart and personal belongings via transport to [place] [address].

## 2020-01-11 NOTE — PROGRESS NOTES
Social work please follow up with son in regards to CHOICE form for SNF (Son Shell, see facesheet)

## 2020-01-11 NOTE — THERAPY
"Speech Language Therapy dysphagia treatment completed.     Functional Status: Received call from RN requesting treatment. Pt was AOX4, cooperative, eager for PO intake. Noted cup of thin liquid at bedside. Pt able to self-administer PO trials ice chips, thin liquids, nectar thickened liquids, puree textures, and soft/chopped textures. Per pt's sons who were present at end of session, pt recently had teeth extracted and is awaiting new dentures. He had been consuming soft foods PTA, but sons report pt \"didn't eat anything-he only drank vodka\". Pt demonstrated a cough and throat clear in 2/5 trials thin liquid via cup (cued small cup sips). No clinical s/o aspiration demonstrated with nectar thickened liquids, puree textures, or soft/chopped textures. One-two swallows noted each bite and sip. Laryngeal elevation was complete to palpation. Recommend initiation of a Dysphagia 2 diet with nectar thickened liquids. Please ensure adequate PO intake over 1-2 meals prior to consideration of Cortrak removal. SLP will continue to follow.     Recommendations: 1) Initiate Dysphagia 2/nectar thickened liquid diet 2) Meds whole in puree 3) Upright at 90 degrees for all PO intake/in chair as able; small bites and sips; 1:1 supervision      Plan of Care: Will benefit from Speech Therapy 3 times per week    Post-Acute Therapy: Recommend inpatient transitional care services for continued speech therapy services.      See \"Rehab Therapy-Acute\" Patient Summary Report for complete documentation.     "

## 2020-01-11 NOTE — PROGRESS NOTES
Received report from previous nurse, Shelbi, regarding prior 12 hours.  POC reviewed with pt, white board updated, pt verbalized understanding, call light within reach.  Pt encouraged to call before getting up.  Bed in lowest position, treaded slippers on.

## 2020-01-11 NOTE — PROGRESS NOTES
· 2 RN skin check complete with MAEVE Aleman RN.   · Devices in place Coretrack, PIV, socks.  · Skin assessed under devices yes.  · Confirmed pressure ulcers found on n/a.  · New potential pressure ulcers noted on none. Wound consult placed? no. Photo uploaded? no.   · Right interior ear with dried scab  · Bilateral upper extremity with bruising, fragile skin  · Sacrum red, blanching  · Spine is red and blanching  · Scab to right 4th toe  · Scab to right interior ankle  · Scab to left 2nd toe   · Scab to left 4th toe  · Heels red and blanching  · Scattered scabs to lower extremities and shins  · The following interventions are in place pressure redistribution mattress, waffle overlay, pillows for repositioning, off load coretrack with tape, moisturizer, mepilex to back and sacrum, float heels on pillows.

## 2020-01-11 NOTE — CARE PLAN
Problem: Safety  Goal: Will remain free from falls  Outcome: PROGRESSING AS EXPECTED  Intervention: Assess risk factors for falls  Flowsheets (Taken 1/11/2020 4689)  Pt Calls for Assistance: Yes  Fall Risk: High Risk to Fall - 2 or more points   History of fall: 0  Mobility Status Assessment: 1-1 Healthcare Provider Required for Assistance with Ambulation & Transfer  Risk for Injury-Any positive answers results in the pt being at high risk for fall related injury: Not Applicable  Note:   All fall precautions in place at this time.      Problem: Bowel/Gastric:  Goal: Normal bowel function is maintained or improved  Outcome: PROGRESSING AS EXPECTED  Intervention: Educate patient and significant other/support system about diet, fluid intake, medications and activity to promote bowel function  Note:   Pt had been having loose Bms yesterday and overnoc. Appears to be resolved. Will continue to monitor. Stool softeners held

## 2020-01-11 NOTE — CARE PLAN
Bed locked and in lowest position. Bed alarm on. Treaded socks in use. Call light and belongings within reach. Patient educated to call for assistance. Patient verbalizes understanding, but requires reinforcement. Hourly rounding in place.    Skin assessment completed, Q2 turns in place. Waffle cushion in use. Pillows in use for positioning and to float heels. Mepilex placed on sacrum and low back. Frequent linen changes to ensure patient stays dry.

## 2020-01-11 NOTE — PROGRESS NOTES
Internal Medicine Interval Note  Note Author: Sanya Alejandre M.D.       Name Slava Lees 1945   Age/Sex 74 y.o. male   MRN [id number]   Code Status DNAR/DNI      After 5PM or if no immediate response to page, please call for cross-coverage  Attending/Team: Dr Pineda/Shyam See Patient List for primary contact information  Call [phone] to page    1st Call - Day Intern (R1):   Dr Alejandre 2nd Call - Day Sr. Resident (R2/R3):   Dr Russo         Reason for interval visit  (Principal Problem)   Acute pancreatitis, SARAH, anemia      Interval Problem Daily Status Update  (24 hours, problem oriented, brief subjective history, new lab/imaging data pertinent to that problem)   A 74-year-old male with past medical history of chronic low back pain, alcohol abuse was transferred from [facility] for higher level of care.  Patient had epigastric pain, nausea, vomiting, diarrhea from 2 weeks with initial lab results showing hypoglycemia 53, elevated lipase 1961, elevated lactic acid 8.5.ultrasound showing small gall stone without wall thickening or ductal dilatation and diffuse advanced liver disease or fatty liver.  CT abdomen without contrast as patient had SARAH showed mesenteric edema,luminal debris sludge and small stones in gall bladder fundus without wall thickening or visualized calcified common duct stones.  MRI showed acute pancreatitis, cholelithiasis with biliary dilatation without CBD stone, diffuse hepatocellular disease?  Cirrhosis and small amount of ascites.    Overnight patient is doing good denies any nausea, vomiting, fever, abdominal pain,passed stool yesterday.Still have back pain patch is helping.    Review of Systems   Constitutional: Positive for weight loss. Negative for chills, fever and malaise/fatigue.   HENT: Negative for ear discharge, ear pain, hearing loss, sore throat and tinnitus.    Eyes: Negative for blurred vision, double  vision, photophobia, pain and redness.   Respiratory: Negative for cough, hemoptysis, sputum production, shortness of breath, wheezing and stridor.    Cardiovascular: Negative for chest pain, palpitations, orthopnea, claudication and leg swelling.   Gastrointestinal: Negative for  abdominal pain, constipation, diarrhea, heartburn, nausea and vomiting.   Genitourinary: Negative for dysuria, flank pain, frequency, hematuria and urgency.   Musculoskeletal: Positive for back pain. Negative for joint pain, myalgias and neck pain.   Skin: Negative for itching and rash.   Neurological: Positive for weakness. Negative for dizziness, tingling, tremors, sensory change and headaches.   Endo/Heme/Allergies: Negative for polydipsia. Does not bruise/bleed easily.    Disposition/Barriers to discharge:   Inpatient/acute pancreatitis, SARAH, Malnutrition.    Consultants/Specialty  General surgery.  PCP: No primary care provider on file.      Quality Measures  Quality-Core Measures   Reviewed items::  Labs reviewed, Medications reviewed and Radiology images reviewed  DVT prophylaxis - SCD's and Heparin.  Antibiotics:  Treating active infection/contamination beyond 24 hours perioperative coverage      Physical Exam       Vitals:    01/10/20 1626 01/10/20 2000 01/11/20 0334 01/11/20 0720   BP: 117/76 110/75 124/83 100/60   Pulse: 75 77 100 78   Resp: 16 16 18 18   Temp: 36.6 °C (97.8 °F) 37.1 °C (98.7 °F) 37.1 °C (98.8 °F) 36.7 °C (98 °F)   TempSrc: Temporal Temporal Temporal Temporal   SpO2: 96% 93% 94% 92%   Weight:   67.9 kg (149 lb 11.1 oz)    Height:         Body mass index is 20.3 kg/m². Weight: 67.9 kg (149 lb 11.1 oz)  Oxygen Therapy:  Pulse Oximetry: 92 %, O2 (LPM): 0, O2 Delivery: None (Room Air)        Physical Exam   Constitutional: He is oriented to person, place, and time and well-developed and in no distress. No distress.   HENT:   Head: Normocephalic and atraumatic.    Eyes: Pupils are equal, round, and reactive to  light. Conjunctivae and EOM are normal. Right eye exhibits no discharge. No scleral icterus.   Neck: Normal range of motion. Neck supple. No tracheal deviation present. No thyromegaly present.   Cardiovascular: Normal rate, regular rhythm and normal heart sounds. Exam reveals no gallop. No murmur heard.  Pulmonary/Chest: Effort normal and breath sounds normal. No respiratory distress. He has no wheezes. He has no rales.   Abdominal: Soft. Bowel sounds are normal. He exhibits no distension and no mass.There is no tenderness, no rebound and no guarding.   Musculoskeletal: Normal range of motion.      General: No tenderness, deformity or edema.   Lymphadenopathy: He has no cervical adenopathy.   Neurological: He is alert and oriented to person, place, and time. No cranial nerve deficit. Gait normal. Coordination normal. GCS score is 15.   Skin: Skin is warm and dry. No rash noted. He is not diaphoretic. No erythema. No pallor.     Assessment/Plan     * Acute pancreatitis  Assessment & Plan  74-year-old male admitted with history  of alcohol abuse and epigastric pain, nausea, vomiting, diarrhea from 2 weeks and with elevated lipase in the 1900s  CT abdomen without contrast from outside facility showed small volume high density sludge and small stone in gallbladder fundus without thickening, visualized calcified CBD stone  [facility] pending.  Called the hospital and they are not ready yet.  I will call again tomorrow  Anion gap of 20 on admission likely related to lactic acidosis/ starvation/ alcoholic   MRI showed acute pancreatitis, cholelithiasis with biliary dilatation without CBD stone, diffuse hepatocellular disease?  Cirrhosis and small amount of ascites.  Plan  Decrease IV fluid LR 83 mL/h  Replete electrolytes  Diet follow Speech recommendations with Cortrak feeding is tolerated 50 mL/h tube feed  Consult nutrition  Speech to recommend about tube feeding.Called Speech. But no  response. Will call again.  Pending rehab evaluation for DC to rehab    Alcohol abuse  Assessment & Plan  Patient with h/o alcohol abuse and developed acute pancreatitis.  PO thiamine    Pancytopenia (HCC)  Assessment & Plan  Patient admitted for acute pancreatitis secondary to ?alcohol labs showed pancytopenia   Plan monitor labs closely  CBC with differential daily  For type and crossmatch  Transfuse if necessary    Cholelithiasis  Assessment & Plan  As seen on CT abdomen from outside hospital  MRI showed acute pancreatitis, cholelithiasis with biliary dilatation without CBD stone, diffuse hepatocellular disease?  Cirrhosis and small amount of ascites.  As per Surg, Hold off on surgery for now, pt needs to get stronger  Pending Rehab evaluation    Electrolyte abnormality  Assessment & Plan  Replete as needed

## 2020-01-11 NOTE — PROGRESS NOTES
Assumed care at 0700 after report received from night RN. Pt A/Ox4, on RA, cortrak in place LT nare. Cortrak flushed and site WNL. Mouth care provided. Pt had been having back spasms overnight and motrin was ordered x1. Per pt he takes motrin at home for his back pain. Pt declined prn pain meds at this time. PIV infusing and site WNL.

## 2020-01-12 LAB
25(OH)D3 SERPL-MCNC: 15 NG/ML (ref 30–100)
ALBUMIN SERPL BCP-MCNC: 2.2 G/DL (ref 3.2–4.9)
ALBUMIN/GLOB SERPL: 0.8 G/DL
ALP SERPL-CCNC: 107 U/L (ref 30–99)
ALT SERPL-CCNC: 29 U/L (ref 2–50)
ANION GAP SERPL CALC-SCNC: 8 MMOL/L (ref 0–11.9)
AST SERPL-CCNC: 38 U/L (ref 12–45)
BACTERIA BLD CULT: NORMAL
BACTERIA BLD CULT: NORMAL
BILIRUB SERPL-MCNC: 0.5 MG/DL (ref 0.1–1.5)
BUN SERPL-MCNC: 26 MG/DL (ref 8–22)
CALCIUM SERPL-MCNC: 7.5 MG/DL (ref 8.5–10.5)
CHLORIDE SERPL-SCNC: 110 MMOL/L (ref 96–112)
CO2 SERPL-SCNC: 23 MMOL/L (ref 20–33)
CREAT SERPL-MCNC: 1.01 MG/DL (ref 0.5–1.4)
GLOBULIN SER CALC-MCNC: 2.6 G/DL (ref 1.9–3.5)
GLUCOSE SERPL-MCNC: 119 MG/DL (ref 65–99)
MAGNESIUM SERPL-MCNC: 1.7 MG/DL (ref 1.5–2.5)
MAGNESIUM SERPL-MCNC: 1.8 MG/DL (ref 1.5–2.5)
PHOSPHATE SERPL-MCNC: 2.1 MG/DL (ref 2.5–4.5)
PHOSPHATE SERPL-MCNC: 2.1 MG/DL (ref 2.5–4.5)
POTASSIUM SERPL-SCNC: 3.8 MMOL/L (ref 3.6–5.5)
PROT SERPL-MCNC: 4.8 G/DL (ref 6–8.2)
SIGNIFICANT IND 70042: NORMAL
SIGNIFICANT IND 70042: NORMAL
SITE SITE: NORMAL
SITE SITE: NORMAL
SODIUM SERPL-SCNC: 141 MMOL/L (ref 135–145)
SOURCE SOURCE: NORMAL
SOURCE SOURCE: NORMAL

## 2020-01-12 PROCEDURE — 700102 HCHG RX REV CODE 250 W/ 637 OVERRIDE(OP): Mod: JG | Performed by: STUDENT IN AN ORGANIZED HEALTH CARE EDUCATION/TRAINING PROGRAM

## 2020-01-12 PROCEDURE — 700102 HCHG RX REV CODE 250 W/ 637 OVERRIDE(OP): Performed by: STUDENT IN AN ORGANIZED HEALTH CARE EDUCATION/TRAINING PROGRAM

## 2020-01-12 PROCEDURE — 82306 VITAMIN D 25 HYDROXY: CPT

## 2020-01-12 PROCEDURE — A9270 NON-COVERED ITEM OR SERVICE: HCPCS | Performed by: STUDENT IN AN ORGANIZED HEALTH CARE EDUCATION/TRAINING PROGRAM

## 2020-01-12 PROCEDURE — 700102 HCHG RX REV CODE 250 W/ 637 OVERRIDE(OP): Performed by: INTERNAL MEDICINE

## 2020-01-12 PROCEDURE — 36415 COLL VENOUS BLD VENIPUNCTURE: CPT

## 2020-01-12 PROCEDURE — 700111 HCHG RX REV CODE 636 W/ 250 OVERRIDE (IP): Performed by: STUDENT IN AN ORGANIZED HEALTH CARE EDUCATION/TRAINING PROGRAM

## 2020-01-12 PROCEDURE — 80053 COMPREHEN METABOLIC PANEL: CPT

## 2020-01-12 PROCEDURE — A9270 NON-COVERED ITEM OR SERVICE: HCPCS | Mod: JG | Performed by: STUDENT IN AN ORGANIZED HEALTH CARE EDUCATION/TRAINING PROGRAM

## 2020-01-12 PROCEDURE — 770006 HCHG ROOM/CARE - MED/SURG/GYN SEMI*

## 2020-01-12 PROCEDURE — A9270 NON-COVERED ITEM OR SERVICE: HCPCS | Performed by: INTERNAL MEDICINE

## 2020-01-12 PROCEDURE — 700101 HCHG RX REV CODE 250: Performed by: STUDENT IN AN ORGANIZED HEALTH CARE EDUCATION/TRAINING PROGRAM

## 2020-01-12 PROCEDURE — 700105 HCHG RX REV CODE 258: Performed by: STUDENT IN AN ORGANIZED HEALTH CARE EDUCATION/TRAINING PROGRAM

## 2020-01-12 PROCEDURE — 84100 ASSAY OF PHOSPHORUS: CPT

## 2020-01-12 PROCEDURE — 99232 SBSQ HOSP IP/OBS MODERATE 35: CPT | Mod: GC | Performed by: INTERNAL MEDICINE

## 2020-01-12 PROCEDURE — 83735 ASSAY OF MAGNESIUM: CPT

## 2020-01-12 RX ORDER — BACLOFEN 10 MG/1
10 TABLET ORAL ONCE
Status: COMPLETED | OUTPATIENT
Start: 2020-01-12 | End: 2020-01-12

## 2020-01-12 RX ORDER — THIAMINE MONONITRATE (VIT B1) 100 MG
100 TABLET ORAL DAILY
Status: DISCONTINUED | OUTPATIENT
Start: 2020-01-13 | End: 2020-01-15 | Stop reason: HOSPADM

## 2020-01-12 RX ORDER — KETOROLAC TROMETHAMINE 30 MG/ML
30 INJECTION, SOLUTION INTRAMUSCULAR; INTRAVENOUS EVERY 6 HOURS PRN
Status: DISCONTINUED | OUTPATIENT
Start: 2020-01-12 | End: 2020-01-13

## 2020-01-12 RX ORDER — MAGNESIUM SULFATE HEPTAHYDRATE 40 MG/ML
2 INJECTION, SOLUTION INTRAVENOUS ONCE
Status: COMPLETED | OUTPATIENT
Start: 2020-01-12 | End: 2020-01-12

## 2020-01-12 RX ORDER — MORPHINE SULFATE 4 MG/ML
2 INJECTION, SOLUTION INTRAMUSCULAR; INTRAVENOUS EVERY 4 HOURS PRN
Status: DISCONTINUED | OUTPATIENT
Start: 2020-01-12 | End: 2020-01-12

## 2020-01-12 RX ORDER — THIAMINE MONONITRATE (VIT B1) 100 MG
100 TABLET ORAL DAILY
Status: DISCONTINUED | OUTPATIENT
Start: 2020-01-12 | End: 2020-01-12

## 2020-01-12 RX ORDER — OXYCODONE HYDROCHLORIDE 5 MG/1
5 TABLET ORAL EVERY 4 HOURS PRN
Status: DISCONTINUED | OUTPATIENT
Start: 2020-01-12 | End: 2020-01-12

## 2020-01-12 RX ADMIN — BACLOFEN 10 MG: 10 TABLET ORAL at 00:22

## 2020-01-12 RX ADMIN — OXYCODONE HYDROCHLORIDE 5 MG: 5 TABLET ORAL at 02:05

## 2020-01-12 RX ADMIN — Medication 100 MG: at 12:45

## 2020-01-12 RX ADMIN — KETOROLAC TROMETHAMINE 30 MG: 30 INJECTION, SOLUTION INTRAMUSCULAR at 19:27

## 2020-01-12 RX ADMIN — ACETAMINOPHEN 1000 MG: 500 TABLET, FILM COATED ORAL at 05:23

## 2020-01-12 RX ADMIN — ACETAMINOPHEN 1000 MG: 500 TABLET, FILM COATED ORAL at 17:05

## 2020-01-12 RX ADMIN — ACETAMINOPHEN 1000 MG: 500 TABLET, FILM COATED ORAL at 12:44

## 2020-01-12 RX ADMIN — MAGNESIUM SULFATE 2 G: 2 INJECTION INTRAVENOUS at 12:45

## 2020-01-12 RX ADMIN — TRAZODONE HYDROCHLORIDE 50 MG: 50 TABLET ORAL at 20:32

## 2020-01-12 RX ADMIN — LIDOCAINE 1 PATCH: 50 PATCH TOPICAL at 05:29

## 2020-01-12 RX ADMIN — MORPHINE SULFATE 2 MG: 4 INJECTION INTRAVENOUS at 02:55

## 2020-01-12 RX ADMIN — OMEPRAZOLE 40 MG: KIT at 05:23

## 2020-01-12 RX ADMIN — OMEPRAZOLE 40 MG: KIT at 17:05

## 2020-01-12 RX ADMIN — HEPARIN SODIUM 5000 UNITS: 5000 INJECTION, SOLUTION INTRAVENOUS; SUBCUTANEOUS at 17:05

## 2020-01-12 RX ADMIN — POTASSIUM PHOSPHATE, MONOBASIC AND POTASSIUM PHOSPHATE, DIBASIC 30 MMOL: 224; 236 INJECTION, SOLUTION, CONCENTRATE INTRAVENOUS at 12:46

## 2020-01-12 NOTE — CARE PLAN
Problem: Safety  Goal: Will remain free from falls  Outcome: PROGRESSING AS EXPECTED  Note:   Remains free of falls.      Problem: Pain Management  Goal: Pain level will decrease to patient's comfort goal  Outcome: PROGRESSING AS EXPECTED  Flowsheets (Taken 1/12/2020 1515)  Non Verbal Scale : Calm  Note:   No complaints of pain during this shift.

## 2020-01-12 NOTE — PROGRESS NOTES
Assumed care of patient at 0700. Report received from night RN. Patient A&Ox4 and VSS on RA. Cortrak still in place. TF switched to nocturnal feeds so patient has more of an appetite during the day to eat. IVF d/c'd. IV mag and potassium phosphate given. Patient intermittently sleeping during the day, patient states he hadn't slept much last night due to back pain. Bed locked and in the lowest position. Call bell within reach, will continue to monitor.

## 2020-01-12 NOTE — PROGRESS NOTES
Internal Medicine Interval Note  Note Author: Sanya Alejandre M.D.     Name Slava Lees 1945   Age/Sex 74 y.o. male   MRN [id number]   Code Status DNAR/DNI      After 5PM or if no immediate response to page, please call for cross-coverage  Attending/Team: Dr Pineda/Shyam See Patient List for primary contact information  Call [phone] to page    1st Call - Day Intern (R1):   Dr Alejandre 2nd Call - Day Sr. Resident (R2/R3):   Dr Russo           Reason for interval visit  (Principal Problem)   Acute pancreatitis, malnutrition.      Interval Problem Daily Status Update  (24 hours, problem oriented, brief subjective history, new lab/imaging data pertinent to that problem)   74-year-old male with past medical history of chronic low back pain, alcohol abuse was transferred from [facility] for higher level of care after he maintained air with complaints of epigastric pain, nausea, vomiting, diarrhea for 2 weeks.  Labs and CT abdomen without contrast as patient had SARAH showed mesenteric edema,luminal debris sludge and small stones in gall bladder fundus without wall thickening or visualized calcified common duct stones.  MRI showed acute pancreatitis, cholelithiasis with biliary dilatation without CBD stone, diffuse hepatocellular disease?  Cirrhosis and small amount of ascites.    Overnight patient had complaint of low back pain which he had chronically he got some morphine and oxycodone which helped he did not have any pain this morning, denies any chest pain, shortness of breath, swelling of the legs, nausea, vomiting, diarrhea.  Patient is getting tube feeds and is well tolerating but he do not have the appetite to start the oral feeding advised the patient to try and eat so that we can discontinue the tube feeding he agreed and had the breakfast but only 50%.        Review of Systems   Constitutional: Positive for weight loss. Negative for chills, fever and malaise/fatigue.    HENT: Negative for ear discharge, ear pain, hearing loss, sore throat and tinnitus.    Eyes: Negative for blurred vision, double vision, photophobia, pain and redness.   Respiratory: Negative for cough, hemoptysis, sputum production, shortness of breath, wheezing and stridor.    Cardiovascular: Negative for chest pain, palpitations, orthopnea, claudication and leg swelling.   Gastrointestinal: Negative for  abdominal pain, constipation, diarrhea, heartburn, nausea and vomiting.   Genitourinary: Negative for dysuria, flank pain, frequency, hematuria and urgency.   Musculoskeletal: Positive for back pain. Negative for joint pain, myalgias and neck pain.   Skin: Negative for itching and rash.   Neurological: Positive for weakness. Negative for dizziness, tingling, tremors, sensory change and headaches.   Endo/Heme/Allergies: Negative for polydipsia. Does not bruise/bleed easily.  Disposition/Barriers to discharge:   Inpatient/acute pancreatitis, malnutrition    Consultants/Specialty  None  PCP: No primary care provider on file.      Quality Measures  Quality-Core Measures   Reviewed items::  Labs reviewed, Medications reviewed and Radiology images reviewed  DVT prophylaxis - SCD's and Heparin.        Physical Exam       Vitals:    01/11/20 2000 01/12/20 0056 01/12/20 0500 01/12/20 0855   BP: 108/63  117/67 104/76   Pulse: 80 (!) 112 97 84   Resp: 20 (!) 25 20 16   Temp: 36.7 °C (98.1 °F)  37.4 °C (99.3 °F) 37 °C (98.6 °F)   TempSrc: Temporal  Temporal Temporal   SpO2: 94% 94% 92% 90%   Weight: 68.6 kg (151 lb 3.8 oz)      Height:         Body mass index is 20.51 kg/m². Weight: 68.6 kg (151 lb 3.8 oz)  Oxygen Therapy:  Pulse Oximetry: 90 %, O2 (LPM): 0, O2 Delivery: None (Room Air)    Physical Exam   Constitutional: He is oriented to person, place, and time and well-developed and in no distress. No distress.   HENT:   Head: Normocephalic and atraumatic.    Eyes: Pupils are equal, round, and reactive to  light. Conjunctivae and EOM are normal. Right eye exhibits no discharge. No scleral icterus.   Neck: Normal range of motion. Neck supple. No tracheal deviation present. No thyromegaly present.   Cardiovascular: Normal rate, regular rhythm and normal heart sounds. Exam reveals no gallop. No murmur heard.  Pulmonary/Chest: Effort normal and breath sounds normal. No respiratory distress. He has no wheezes. He has no rales.   Abdominal: Soft. Bowel sounds are normal. He exhibits no distension and no mass.There is no tenderness, no rebound and no guarding.   Musculoskeletal: Normal range of motion.      General: No tenderness, deformity or edema.   Lymphadenopathy: He has no cervical adenopathy.   Neurological: He is alert and oriented to person, place, and time. No cranial nerve deficit. Gait normal. Coordination normal. GCS score is 15.   Skin: Skin is warm and dry. No rash noted. He is not diaphoretic. No erythema. No pallor.        Assessment/Plan     * Acute pancreatitis  Assessment & Plan  74-year-old male admitted with history  of alcohol abuse and epigastric pain, nausea, vomiting, diarrhea from 2 weeks and with elevated lipase in the 1900s  CT abdomen without contrast from outside facility showed small volume high density sludge and small stone in gallbladder fundus without thickening, visualized calcified CBD stone  Blood cultures done at [facility] pending.  Called the hospital and they are not ready yet.  I will call again tomorrow  Anion gap of 20 on admission likely related to lactic acidosis/ starvation/ alcoholic   MRI showed acute pancreatitis, cholelithiasis with biliary dilatation without CBD stone, diffuse hepatocellular disease?  Cirrhosis and small amount of ascites.  Plan  D/C IV Fluids.  Replete electrolytes  Diet follow Speech recommendations with Cortrak feeding is tolerated 60 mL/h tube feed and to start with dysphagia diet if tolerated will d/C tube feeding.  Pending rehab  evaluation for DC to rehab    Alcohol abuse  Assessment & Plan  Patient with h/o alcohol abuse and developed acute pancreatitis.  PO thiamine    Pancytopenia (HCC)  Assessment & Plan  Patient admitted for acute pancreatitis secondary to ?alcohol labs showed pancytopenia   Plan monitor labs closely  CBC with differential daily  For type and crossmatch  Transfuse if necessary    Cholelithiasis  Assessment & Plan  As seen on CT abdomen from outside hospital  MRI showed acute pancreatitis, cholelithiasis with biliary dilatation without CBD stone, diffuse hepatocellular disease?  Cirrhosis and small amount of ascites.  As per Surg, Hold off on surgery for now, pt needs to get stronger  Pending Rehab evaluation    Electrolyte abnormality  Assessment & Plan  Replete as needed

## 2020-01-12 NOTE — DIETARY
Nutrition Support Brief Update: TF running at goal with Diabetisource AC @ 60 ml/hr. Refeeding lab phos slightly low (2.1), reviewed with resident and phos repletion and thiamine added. Discussed with RN, pt started diet yesterday (Dysphagia II, Nectar thick liquids with 1:1 supervision per SLP) but complains of no appetite due to Tube feeding and wanting to hold feeds. Will adjust TF to Nocturnal feeds at ~ 75% of kcal needs and 85% of protein needs to help stimulate appetite and increase PO intake. Pt ate <25% of breakfast this am per ADL.       Plan/Rec:   1) Will adjust TF with Diabetisource AC @ 85 ml/hr X 12 hours to provide 1224 kcals/day, 61 g pro/day, and 832 ml free water/day. Recommend 1800 to 0600 feeding regimen.   2) Continue to monitor for refeeding: replete K, Phos and Mg prn. Supplement 100 mg Thiamine x 7 days to reduce risk of refeeding.   3) Advance diet as determined safe per SLP.     RD following.

## 2020-01-12 NOTE — PROGRESS NOTES
Patient with continuous muscle spasms, causing patient's body to flex upright. Patient stating pain 9/10. Pt medicated with 1 mg IV morphine per order. K-Pad in place.

## 2020-01-12 NOTE — CARE PLAN
Problem: Safety  Goal: Will remain free from falls  Outcome: PROGRESSING AS EXPECTED  Patient educated on HIGH fall risk - all fall interventions implemented.      Problem: Pain Management  Goal: Pain level will decrease to patient's comfort goal  Outcome: PROGRESSING SLOWER THAN EXPECTED   Pt is restless, tachycardic, with tachypnea. Multiple pages to MD ARDEN at bedside

## 2020-01-12 NOTE — PROGRESS NOTES
MD at bedside to assess patient. MD Jane stated he would increase dosage of pain medication. Awaiting new orders

## 2020-01-12 NOTE — PROGRESS NOTES
Report received from day RNLEROY. Assumed care of pt at 1915. Pt is A&Ox4, on room air. Coretrack in place with continuous tube feed. Pt educated on the need to remain head of bed elevated at 30 degrees minimum - pt verbalizes understanding. Pt educated on plan of care, including diet order. Pt declining dinner tray at this time. Pt assisted to restroom with FWW, pt continuously with loose BMs. Pt educated on HIGH fall risk - bed alarm on, bed in low and locked position, call light/belongings within reach. Call light plugged in - pt appropriately demonstrates how to utilize call light.

## 2020-01-12 NOTE — PROGRESS NOTES
Medication administered per order, one time dose. Pt with tachypnea and tachycardia. Pt states that pain is in mid lower back, and does not radiate. K-pad in place. Offered to assist patient to chair, or to ambulate. Pt stating he is unable to at this time RT pain/ Charge RN notified

## 2020-01-13 ENCOUNTER — APPOINTMENT (OUTPATIENT)
Dept: RADIOLOGY | Facility: MEDICAL CENTER | Age: 75
DRG: 439 | End: 2020-01-13
Attending: INTERNAL MEDICINE
Payer: MEDICARE

## 2020-01-13 ENCOUNTER — HOSPITAL ENCOUNTER (INPATIENT)
Facility: REHABILITATION | Age: 75
End: 2020-01-13
Attending: PHYSICAL MEDICINE & REHABILITATION | Admitting: PHYSICAL MEDICINE & REHABILITATION
Payer: MEDICARE

## 2020-01-13 LAB
ALBUMIN SERPL BCP-MCNC: 2.6 G/DL (ref 3.2–4.9)
ALBUMIN/GLOB SERPL: 0.8 G/DL
ALP SERPL-CCNC: 110 U/L (ref 30–99)
ALT SERPL-CCNC: 30 U/L (ref 2–50)
ANION GAP SERPL CALC-SCNC: 9 MMOL/L (ref 0–11.9)
AST SERPL-CCNC: 43 U/L (ref 12–45)
BASOPHILS # BLD AUTO: 0.4 % (ref 0–1.8)
BASOPHILS # BLD: 0.03 K/UL (ref 0–0.12)
BILIRUB SERPL-MCNC: 0.8 MG/DL (ref 0.1–1.5)
BUN SERPL-MCNC: 24 MG/DL (ref 8–22)
CALCIUM SERPL-MCNC: 7.8 MG/DL (ref 8.5–10.5)
CHLORIDE SERPL-SCNC: 108 MMOL/L (ref 96–112)
CO2 SERPL-SCNC: 22 MMOL/L (ref 20–33)
CREAT SERPL-MCNC: 1.07 MG/DL (ref 0.5–1.4)
CRP SERPL HS-MCNC: 8.62 MG/DL (ref 0–0.75)
EOSINOPHIL # BLD AUTO: 0.07 K/UL (ref 0–0.51)
EOSINOPHIL NFR BLD: 0.9 % (ref 0–6.9)
ERYTHROCYTE [DISTWIDTH] IN BLOOD BY AUTOMATED COUNT: 58.6 FL (ref 35.9–50)
FOLATE SERPL-MCNC: 12.7 NG/ML
GLOBULIN SER CALC-MCNC: 3.1 G/DL (ref 1.9–3.5)
GLUCOSE BLD-MCNC: 136 MG/DL (ref 65–99)
GLUCOSE BLD-MCNC: 73 MG/DL (ref 65–99)
GLUCOSE BLD-MCNC: 96 MG/DL (ref 65–99)
GLUCOSE SERPL-MCNC: 109 MG/DL (ref 65–99)
HCT VFR BLD AUTO: 26.4 % (ref 42–52)
HGB BLD-MCNC: 8.6 G/DL (ref 14–18)
IMM GRANULOCYTES # BLD AUTO: 0.06 K/UL (ref 0–0.11)
IMM GRANULOCYTES NFR BLD AUTO: 0.7 % (ref 0–0.9)
IRON SATN MFR SERPL: 19 % (ref 15–55)
IRON SERPL-MCNC: 38 UG/DL (ref 50–180)
LYMPHOCYTES # BLD AUTO: 1.42 K/UL (ref 1–4.8)
LYMPHOCYTES NFR BLD: 17.4 % (ref 22–41)
MAGNESIUM SERPL-MCNC: 1.8 MG/DL (ref 1.5–2.5)
MCH RBC QN AUTO: 33.2 PG (ref 27–33)
MCHC RBC AUTO-ENTMCNC: 32.6 G/DL (ref 33.7–35.3)
MCV RBC AUTO: 101.9 FL (ref 81.4–97.8)
MONOCYTES # BLD AUTO: 0.98 K/UL (ref 0–0.85)
MONOCYTES NFR BLD AUTO: 12 % (ref 0–13.4)
NEUTROPHILS # BLD AUTO: 5.61 K/UL (ref 1.82–7.42)
NEUTROPHILS NFR BLD: 68.6 % (ref 44–72)
NRBC # BLD AUTO: 0 K/UL
NRBC BLD-RTO: 0 /100 WBC
PHOSPHATE SERPL-MCNC: 4.2 MG/DL (ref 2.5–4.5)
PLATELET # BLD AUTO: 116 K/UL (ref 164–446)
PMV BLD AUTO: 10.5 FL (ref 9–12.9)
POTASSIUM SERPL-SCNC: 4 MMOL/L (ref 3.6–5.5)
PREALB SERPL-MCNC: 8 MG/DL (ref 18–38)
PROT SERPL-MCNC: 5.7 G/DL (ref 6–8.2)
RBC # BLD AUTO: 2.59 M/UL (ref 4.7–6.1)
SODIUM SERPL-SCNC: 139 MMOL/L (ref 135–145)
TIBC SERPL-MCNC: 199 UG/DL (ref 250–450)
TRANSFERRIN SERPL-MCNC: 142 MG/DL (ref 200–370)
VIT B12 SERPL-MCNC: 953 PG/ML (ref 211–911)
WBC # BLD AUTO: 8.2 K/UL (ref 4.8–10.8)

## 2020-01-13 PROCEDURE — 700111 HCHG RX REV CODE 636 W/ 250 OVERRIDE (IP): Performed by: STUDENT IN AN ORGANIZED HEALTH CARE EDUCATION/TRAINING PROGRAM

## 2020-01-13 PROCEDURE — 83735 ASSAY OF MAGNESIUM: CPT

## 2020-01-13 PROCEDURE — 700102 HCHG RX REV CODE 250 W/ 637 OVERRIDE(OP): Performed by: STUDENT IN AN ORGANIZED HEALTH CARE EDUCATION/TRAINING PROGRAM

## 2020-01-13 PROCEDURE — A9270 NON-COVERED ITEM OR SERVICE: HCPCS | Performed by: INTERNAL MEDICINE

## 2020-01-13 PROCEDURE — 82607 VITAMIN B-12: CPT

## 2020-01-13 PROCEDURE — 84100 ASSAY OF PHOSPHORUS: CPT

## 2020-01-13 PROCEDURE — 86140 C-REACTIVE PROTEIN: CPT

## 2020-01-13 PROCEDURE — 82962 GLUCOSE BLOOD TEST: CPT

## 2020-01-13 PROCEDURE — 83540 ASSAY OF IRON: CPT

## 2020-01-13 PROCEDURE — 770006 HCHG ROOM/CARE - MED/SURG/GYN SEMI*

## 2020-01-13 PROCEDURE — 84466 ASSAY OF TRANSFERRIN: CPT

## 2020-01-13 PROCEDURE — 80053 COMPREHEN METABOLIC PANEL: CPT

## 2020-01-13 PROCEDURE — 700101 HCHG RX REV CODE 250: Performed by: STUDENT IN AN ORGANIZED HEALTH CARE EDUCATION/TRAINING PROGRAM

## 2020-01-13 PROCEDURE — A9270 NON-COVERED ITEM OR SERVICE: HCPCS | Performed by: STUDENT IN AN ORGANIZED HEALTH CARE EDUCATION/TRAINING PROGRAM

## 2020-01-13 PROCEDURE — 84134 ASSAY OF PREALBUMIN: CPT

## 2020-01-13 PROCEDURE — 36415 COLL VENOUS BLD VENIPUNCTURE: CPT

## 2020-01-13 PROCEDURE — 83550 IRON BINDING TEST: CPT

## 2020-01-13 PROCEDURE — 700102 HCHG RX REV CODE 250 W/ 637 OVERRIDE(OP): Performed by: INTERNAL MEDICINE

## 2020-01-13 PROCEDURE — 99232 SBSQ HOSP IP/OBS MODERATE 35: CPT | Mod: GC | Performed by: INTERNAL MEDICINE

## 2020-01-13 PROCEDURE — 85025 COMPLETE CBC W/AUTO DIFF WBC: CPT

## 2020-01-13 PROCEDURE — 82746 ASSAY OF FOLIC ACID SERUM: CPT

## 2020-01-13 RX ORDER — OXYCODONE HYDROCHLORIDE 5 MG/1
5 TABLET ORAL 3 TIMES DAILY PRN
Status: DISCONTINUED | OUTPATIENT
Start: 2020-01-13 | End: 2020-01-13

## 2020-01-13 RX ORDER — OXYCODONE HYDROCHLORIDE 5 MG/1
5 TABLET ORAL
Status: DISCONTINUED | OUTPATIENT
Start: 2020-01-13 | End: 2020-01-14

## 2020-01-13 RX ORDER — MORPHINE SULFATE 4 MG/ML
2 INJECTION, SOLUTION INTRAMUSCULAR; INTRAVENOUS ONCE
Status: COMPLETED | OUTPATIENT
Start: 2020-01-13 | End: 2020-01-13

## 2020-01-13 RX ORDER — ERGOCALCIFEROL 1.25 MG/1
50000 CAPSULE ORAL
Status: DISCONTINUED | OUTPATIENT
Start: 2020-01-13 | End: 2020-01-15 | Stop reason: HOSPADM

## 2020-01-13 RX ADMIN — ACETAMINOPHEN 1000 MG: 500 TABLET, FILM COATED ORAL at 12:06

## 2020-01-13 RX ADMIN — ACETAMINOPHEN 1000 MG: 500 TABLET, FILM COATED ORAL at 05:09

## 2020-01-13 RX ADMIN — KETOROLAC TROMETHAMINE 30 MG: 30 INJECTION, SOLUTION INTRAMUSCULAR at 02:03

## 2020-01-13 RX ADMIN — OXYCODONE HYDROCHLORIDE 5 MG: 5 TABLET ORAL at 10:48

## 2020-01-13 RX ADMIN — MORPHINE SULFATE 2 MG: 4 INJECTION INTRAVENOUS at 03:49

## 2020-01-13 RX ADMIN — OMEPRAZOLE 40 MG: KIT at 05:11

## 2020-01-13 RX ADMIN — Medication 100 MG: at 05:12

## 2020-01-13 RX ADMIN — ENOXAPARIN SODIUM 40 MG: 100 INJECTION SUBCUTANEOUS at 17:36

## 2020-01-13 RX ADMIN — ERGOCALCIFEROL 50000 UNITS: 1.25 CAPSULE ORAL at 18:16

## 2020-01-13 RX ADMIN — NICOTINE TRANSDERMAL SYSTEM 21 MG: 21 PATCH, EXTENDED RELEASE TRANSDERMAL at 05:07

## 2020-01-13 RX ADMIN — HEPARIN SODIUM 5000 UNITS: 5000 INJECTION, SOLUTION INTRAVENOUS; SUBCUTANEOUS at 05:04

## 2020-01-13 RX ADMIN — ACETAMINOPHEN 1000 MG: 500 TABLET, FILM COATED ORAL at 17:35

## 2020-01-13 RX ADMIN — LIDOCAINE 1 PATCH: 50 PATCH TOPICAL at 05:05

## 2020-01-13 RX ADMIN — OXYCODONE HYDROCHLORIDE 5 MG: 5 TABLET ORAL at 17:35

## 2020-01-13 RX ADMIN — OMEPRAZOLE 40 MG: KIT at 17:36

## 2020-01-13 NOTE — CARE PLAN
Problem: Venous Thromboembolism (VTW)/Deep Vein Thrombosis (DVT) Prevention:  Goal: Patient will participate in Venous Thrombosis (VTE)/Deep Vein Thrombosis (DVT)Prevention Measures  Outcome: PROGRESSING AS EXPECTED  Flowsheets (Taken 1/12/2020 1939)  Risk Assessment Score: 1  Pharmacologic Prophylaxis Used: Unfractionated Heparin     Problem: Pain Management  Goal: Pain level will decrease to patient's comfort goal  Outcome: PROGRESSING AS EXPECTED  Note:   Pt c/o 6/10 back spasms. Medication administered per MAR. Will continue to monitor pt pain

## 2020-01-13 NOTE — CARE PLAN
Problem: Safety  Goal: Will remain free from falls  Outcome: PROGRESSING AS EXPECTED  Intervention: Assess risk factors for falls  Flowsheets  Taken 1/13/2020 1127 by Natasha Khan, C.N.A.  Pt Calls for Assistance: Yes  Taken 1/11/2020 0859 by Shobha Hunt R.NLindsay  Fall Risk: High Risk to Fall - 2 or more points   History of fall: 0  Risk for Injury-Any positive answers results in the pt being at high risk for fall related injury: Not Applicable  Taken 1/12/2020 0800 by Indigo Hall RBELLA  Mobility Status Assessment: 1-1 Healthcare Provider Required for Assistance with Ambulation & Transfer  Note:   All fall precautions in place based on risk     Problem: Discharge Barriers/Planning  Goal: Patient's continuum of care needs will be met  Outcome: PROGRESSING AS EXPECTED  Intervention: Assess potential discharge barriers on admission and throughout hospital stay  Flowsheets (Taken 1/13/2020 1437)  Does Admitting Nurse Feel This Could be a Complex Discharge?: No  Note:   Pt agreeable to SNF, needs choice form filled out

## 2020-01-13 NOTE — PROGRESS NOTES
Internal Medicine Cross Coverage Note    Received a call from nursing regarding 9/10 lower back pain, in spite of multimodal pain treatment (lidocaine patch and toradol). Patient states that the current regimen is not helping him at all, and pain is unbearable. This is similar to previous nights' complaints. Patient has been responsive to morphine for this pain on previous days. RN requests a one-time dose. Ordered, and requested that nursing/patient address this concern with the primary team during rounds.

## 2020-01-13 NOTE — PROGRESS NOTES
Assumed care at 1900, report received from day shift RN.  Pt is sitting up in bed, awake. Pt is A&O x4,c/o 7/10 back pain, medication given per MAR. Bed is locked and in the lowest position. Call light and belongings within reach. Plan of care discussed and whiteboard updated, Pt has no questions at this time.

## 2020-01-13 NOTE — PROGRESS NOTES
Assumed care at 0700 after report received from night RN. Pt A/Ox4, on RA, cortrak in place LT nare. Cortrak flushed and site WNL.  Pt had been having back spasms overnight, continued over weekend- MD made aware that all things ordered have not been helpful.  Pt declined prn pain meds at this time. PIV site WNL. Pt on nocturnal tube feed (2100-1881), dys 2 diet during day. Encourage PO intake and ambulation

## 2020-01-13 NOTE — PROGRESS NOTES
Internal Medicine Interval Note  Note Author: Sanya Alejandre M.D.     Name Slava Lees 1945   Age/Sex 74 y.o. male   MRN [id number]   Code Status DNAR/DNI     After 5PM or if no immediate response to page, please call for cross-coverage  Attending/Team: Dr Alas/Shyam See Patient List for primary contact information  Call [phone] to page    1st Call - Day Intern (R1):   Dr Alejandre 2nd Call - Day Sr. Resident (R2/R3):   Dr Russo         Reason for interval visit  (Principal Problem)   Acute pancreatitis, malnutrition    Interval Problem Daily Status Update  (24 hours, problem oriented, brief subjective history, new lab/imaging data pertinent to that problem)   A 74-year-old male with past medical history of chronic low back pain, alcohol abuse was transferred from [facility] for higher level of care on  with complaints of epigastric pain, nausea, vomiting, diarrhea for 2 weeks.  Labs and CT abdomen without contrast showed mesenteric edema, luminal debris sludge and small stones in gallbladder fundus without wall thickening are visualized calcified duct stones.  MRI showed acute pancreatitis, cholelithiasis with biliary dilatation without CBD stone diffuse hepatocellular disease?  Cirrhosis and small amount of ascites.    Overnight patient had complaint of low back pain which he had chronically got somewhat morphine did not have any pain this morning.  Denies any shortness of breath, chest pain, swelling of the legs, nausea, vomiting, diarrhea.  He has BM this morning.  Patient got oral feeding but he tolerated only 50% getting tube feedings 60 mL/h.     Review of Systems   Constitutional: Positive for weight loss. Negative for chills, fever and malaise/fatigue.   HENT: Negative for ear discharge, ear pain, hearing loss, sore throat and tinnitus.    Eyes: Negative for blurred vision, double vision, photophobia, pain and redness.   Respiratory: Negative  for cough, hemoptysis, sputum production, shortness of breath, wheezing and stridor.    Cardiovascular: Negative for chest pain, palpitations, orthopnea, claudication and leg swelling.   Gastrointestinal: Negative for  abdominal pain, constipation, diarrhea, heartburn, nausea and vomiting.   Genitourinary: Negative for dysuria, flank pain, frequency, hematuria and urgency.   Musculoskeletal: Positive for back pain. Negative for joint pain, myalgias and neck pain.   Skin: Negative for itching and rash.   Neurological: Positive for weakness. Negative for dizziness, tingling, tremors, sensory change and headaches.   Endo/Heme/Allergies: Negative for polydipsia. Does not bruise/bleed easily.     Disposition/Barriers to discharge:   Inpatient/Acute pancreatitis , malnutrition.    Consultants/Specialty  Physiatry    PCP: No primary care provider on file.      Quality Measures  Quality-Core Measures   Reviewed items::  Labs reviewed, Medications reviewed and Radiology images reviewed  DVT prophylaxis - SCD's and lovenox.      Physical Exam       Vitals:    01/12/20 1520 01/12/20 1900 01/13/20 0300 01/13/20 0740   BP: 106/73 113/79 132/85 114/79   Pulse: 76 88 100 82   Resp: 16 16 18 18   Temp: 37.2 °C (99 °F) 37.3 °C (99.1 °F) 36.3 °C (97.4 °F) 36.7 °C (98.1 °F)   TempSrc: Temporal Temporal Temporal Temporal   SpO2: 92% 94% 94% 94%   Weight:   67.2 kg (148 lb 2.4 oz)    Height:         Body mass index is 20.09 kg/m². Weight: 67.2 kg (148 lb 2.4 oz)  Oxygen Therapy:  Pulse Oximetry: 94 %, O2 (LPM): 0, O2 Delivery: None (Room Air)    Physical Exam   Constitutional: He is oriented to person, place, and time and in no distress.   HENT:   Head: Normocephalic and atraumatic.    Eyes: Pupils are equal, round, and reactive to light. Conjunctivae and EOM are normal. Right eye exhibits no discharge. No scleral icterus.   Neck: Normal range of motion. Neck supple. No tracheal deviation present. No thyromegaly present.    Cardiovascular: Normal rate, regular rhythm and normal heart sounds. Exam reveals no gallop. No murmur heard.  Pulmonary/Chest: Effort normal and breath sounds normal. No respiratory distress. He has no wheezes. He has no rales.   Abdominal: Soft. Bowel sounds are normal. He exhibits no distension and no mass.There is no tenderness, no rebound and no guarding.   Musculoskeletal: mild tenderness over the left side of the lumbar area.     General: No tenderness, deformity or edema.   Lymphadenopathy: He has no cervical adenopathy.   Neurological: He is alert and oriented to person, place, and time. No cranial nerve deficit. Gait normal. Coordination normal. GCS score is 15.   Skin: Skin is warm and dry. No rash noted. He is not diaphoretic. No erythema. No pallor.        Assessment/Plan     * Acute pancreatitis  Assessment & Plan  74-year-old male admitted with history  of alcohol abuse and epigastric pain, nausea, vomiting, diarrhea from 2 weeks and with elevated lipase in the 1900s  CT abdomen without contrast from outside facility showed small volume high density sludge and small stone in gallbladder fundus without thickening, visualized calcified CBD stone  Blood cultures done at [facility] pending.  Called the hospital and they are not ready yet.  I will call again tomorrow  Anion gap of 20 on admission likely related to lactic acidosis/ starvation/ alcoholic   MRI showed acute pancreatitis, cholelithiasis with biliary dilatation without CBD stone, diffuse hepatocellular disease?  Cirrhosis and small amount of ascites.  Plan  Replete electrolytes  Diet follow Speech recommendations with Cortrak feeding is tolerated 60 mL/h tube feed and to start with dysphagia diet if tolerated will d/C tube feeding.  Pending rehab evaluation for DC to rehab    Alcohol abuse  Assessment & Plan  Patient with h/o alcohol abuse and developed acute pancreatitis.  PO thiamine    Pancytopenia (HCC)  Assessment &  Plan  Patient admitted for acute pancreatitis secondary to ?alcohol labs showed pancytopenia   Plan monitor labs closely  CBC with differential daily  Folate, B12, Iron/ transferrin.  For type and crossmatch  Transfuse if necessary    Cholelithiasis  Assessment & Plan  As seen on CT abdomen from outside hospital  MRI showed acute pancreatitis, cholelithiasis with biliary dilatation without CBD stone, diffuse hepatocellular disease?  Cirrhosis and small amount of ascites.  As per Surg, Hold off on surgery for now, pt needs to get stronger  Pending Rehab evaluation    Electrolyte abnormality  Assessment & Plan  Replete as needed

## 2020-01-14 PROBLEM — E46 MALNUTRITION (HCC): Status: ACTIVE | Noted: 2020-01-14

## 2020-01-14 LAB
GLUCOSE BLD-MCNC: 104 MG/DL (ref 65–99)
GLUCOSE BLD-MCNC: 110 MG/DL (ref 65–99)
GLUCOSE BLD-MCNC: 116 MG/DL (ref 65–99)
GLUCOSE BLD-MCNC: 84 MG/DL (ref 65–99)

## 2020-01-14 PROCEDURE — 700101 HCHG RX REV CODE 250: Performed by: STUDENT IN AN ORGANIZED HEALTH CARE EDUCATION/TRAINING PROGRAM

## 2020-01-14 PROCEDURE — A9270 NON-COVERED ITEM OR SERVICE: HCPCS | Performed by: STUDENT IN AN ORGANIZED HEALTH CARE EDUCATION/TRAINING PROGRAM

## 2020-01-14 PROCEDURE — A9270 NON-COVERED ITEM OR SERVICE: HCPCS | Performed by: INTERNAL MEDICINE

## 2020-01-14 PROCEDURE — 770006 HCHG ROOM/CARE - MED/SURG/GYN SEMI*

## 2020-01-14 PROCEDURE — 700102 HCHG RX REV CODE 250 W/ 637 OVERRIDE(OP): Performed by: STUDENT IN AN ORGANIZED HEALTH CARE EDUCATION/TRAINING PROGRAM

## 2020-01-14 PROCEDURE — 700102 HCHG RX REV CODE 250 W/ 637 OVERRIDE(OP): Performed by: INTERNAL MEDICINE

## 2020-01-14 PROCEDURE — 99232 SBSQ HOSP IP/OBS MODERATE 35: CPT | Mod: GC | Performed by: INTERNAL MEDICINE

## 2020-01-14 PROCEDURE — 82962 GLUCOSE BLOOD TEST: CPT | Mod: 91

## 2020-01-14 PROCEDURE — 700111 HCHG RX REV CODE 636 W/ 250 OVERRIDE (IP): Performed by: STUDENT IN AN ORGANIZED HEALTH CARE EDUCATION/TRAINING PROGRAM

## 2020-01-14 RX ORDER — DRONABINOL 2.5 MG/1
2.5 CAPSULE ORAL
Status: DISCONTINUED | OUTPATIENT
Start: 2020-01-14 | End: 2020-01-15 | Stop reason: HOSPADM

## 2020-01-14 RX ORDER — OXYCODONE HYDROCHLORIDE 5 MG/1
5 TABLET ORAL 2 TIMES DAILY PRN
Status: DISCONTINUED | OUTPATIENT
Start: 2020-01-14 | End: 2020-01-15 | Stop reason: HOSPADM

## 2020-01-14 RX ORDER — OXYCODONE HYDROCHLORIDE 5 MG/1
5 TABLET ORAL 2 TIMES DAILY
Status: DISCONTINUED | OUTPATIENT
Start: 2020-01-14 | End: 2020-01-14

## 2020-01-14 RX ADMIN — OMEPRAZOLE 40 MG: KIT at 06:09

## 2020-01-14 RX ADMIN — OXYCODONE HYDROCHLORIDE 5 MG: 5 TABLET ORAL at 07:24

## 2020-01-14 RX ADMIN — ACETAMINOPHEN 1000 MG: 500 TABLET, FILM COATED ORAL at 17:49

## 2020-01-14 RX ADMIN — OXYCODONE HYDROCHLORIDE 5 MG: 5 TABLET ORAL at 16:27

## 2020-01-14 RX ADMIN — LIDOCAINE 1 PATCH: 50 PATCH TOPICAL at 06:11

## 2020-01-14 RX ADMIN — ENOXAPARIN SODIUM 40 MG: 100 INJECTION SUBCUTANEOUS at 06:10

## 2020-01-14 RX ADMIN — ACETAMINOPHEN 1000 MG: 500 TABLET, FILM COATED ORAL at 11:10

## 2020-01-14 RX ADMIN — Medication 100 MG: at 06:10

## 2020-01-14 RX ADMIN — ACETAMINOPHEN 1000 MG: 500 TABLET, FILM COATED ORAL at 06:09

## 2020-01-14 RX ADMIN — DRONABINOL 2.5 MG: 2.5 CAPSULE ORAL at 16:27

## 2020-01-14 RX ADMIN — OMEPRAZOLE 40 MG: KIT at 17:52

## 2020-01-14 NOTE — PROGRESS NOTES
Report received from [facility] RNRIA. Assumed care of pt at 0710. Pt is A&Ox4, on room air. Pt with muscle spasms causing patient's torso to flex upward in the bed. Pt is restless, reporting 8/10 pain to lower mid back. Lidocaine patch in place, patient readjusted and K-pad placed. Fall precautions in place - bed is in low and locked position, call light/belongings within reach, bed alarm on. Plan of care discussed and all questions answered. Pt encouraged to ambulate, pt states that he will call once the muscle spasms and back pain decrease. Awaiting rounds with MD. Pt appropriately demonstrates how to utilize the call light.

## 2020-01-14 NOTE — DIETARY
Nutrition support weekly update:  Day 9 of admit.  73 yo male admitted with pancreatitis.  Tube feeding initiated on 1/7 and dysphagia 2 thick liquid diet started on 1/11. Current TF Diabetisource AC @ 85 ml/lhr x 12 hours per day (running overnight) providing 1224 kcals, 61 g protein, 832 ml H20/day - this is providing 75% of nutrition needs.      Assessment:  Weight on 1/13 67.2 kg is increased 8.3 kg from admit bed scale weight of 58.9 kg. Likely related to fluid gains.     Evaluation:   1. Prealbumin 8 is unchanged from last week. CRP 8.62  Is decreased from 13.59 last week indicating decreased inflammation.  2. Pt was seen by SLP and started on a dysphagia 2, nectar thick liquid diet on 1/11. Pt has been refusing about half of his meals and taking 25% of others. RN reports pt initially was motivated to eat but now is not. Tube feeding schedule was changed on 1/12 to run for 12 hours over night and provide 75% of estimated nutritional needs to allow for increased appetite during the day.   3. Current feeding and oral diet meeting nutritional needs for healing.     Malnutrition risk: pt noted to have severe acute injury related malnutrition. See RD note on 1/7.     Recommendations/Plan:  1. Continue with oral diet per SLP recommendations and night feedings with Diabetisource AC @ 85 ml/hr x 12 hours per day.  2. Encourage intake of meals. Consider discontinuing tube feedings when pt is consistently taking 50% of meals.    3. Consider SLP re-evaluation for diet advancement as appropriate. This may improve intake.

## 2020-01-14 NOTE — DISCHARGE PLANNING
Received Choice form at 3354  Agency/Facility Name: [facility]  Referral sent per Choice form @ [phone]

## 2020-01-14 NOTE — THERAPY
NOTE ONLY: Attempted to see patient for dysphagia tx, unable to fully arouse patient at this time. HELD.

## 2020-01-14 NOTE — PROGRESS NOTES
Pt denies SOB, ambulates 1x assist and walk, steady gait, states 4 out of 10 pain that was well managed with rest and relaxation, lung sound diminish, bowel sound hyperactive, abdomen soft non-tender, heart regular, Nasal gastric tube feed in place q12hr, flush 60mL of water q4hr. Preventive Mepelix on sacrum, blanchable redness found of lower spine, mepelix now in place, C/D/I.

## 2020-01-14 NOTE — THERAPY
"Attempted to see pt for Occupational Therapy treatment today. Pt adamantly refused c/o severe back pain and muscles spasms. \"I can't do anything today\",  \"I need to rest.\"  Pt requested OT \"come back tomorrow\". Informed RN. RN stating pt was up half the night with back pain and muscle spasms. RN informed of OT attempt and refusal. Will attempt next scheduled OT session RN informed and agreed.   "

## 2020-01-14 NOTE — CONSULTS
Medical chart review completed.     Patient is a 74 y.o. male  with a past medical history of alcohol abuse, chronic back pain, admitted to [facility] on 1/5 as a transfer from [facility], with nausea, emesis, diarrhea. Patient hypoglycemic upon arrival. MRI of abdomen showed acute pancreatitis, cholelithiasis, diffuse hepatocellular disease, cirrhosis, ascites. Patient with coretrak for tube feeds. Labs with pancytopenia. No current plan for cholecystectomy due to his medical co-morbidities. He has been having trouble with overnight back spasms, non-responsive to Toradol, tylenol, or lidocaine patches, responsive to morphine.     Patient with multiple co-morbidities(including but not limited to dysphagia, pancytopenia, pancreatitis); with functional deficits in mobility/self-cares/swallowing, and Severe de-conditioning.     Pre-morbidly, this patient lived in a two level home with Two  steps to enter, alone and able to care for self. The patient was evaluated by acute care Physical Therapy, Occupational Therapy and Speech Language Pathology; currently requiring minimal assistance for mobility and minimal assistance for ADLs, also with ongoing swallowing deficits.     These evaluations were done on the 9th.     6 clicks score 17 mobility, 13 ADLs    The patient is not a good candidate for an acute inpatient rehabilitation program as it does not appear his pain is able to be controlled on non-opiates, and he has not seen therapy in 5 days - his function likely has improved. In addition, he doesn't have a lot of medical complexity to require daily physician rounding.    Recommend discharge to SNF for his rehabilitation.    Thank you for allowing me to participate in the care of this patient.      Vandana Hauser M.D.  Physical Medicine and Rehabilitation

## 2020-01-15 ENCOUNTER — APPOINTMENT (OUTPATIENT)
Dept: RADIOLOGY | Facility: MEDICAL CENTER | Age: 75
DRG: 439 | End: 2020-01-15
Attending: INTERNAL MEDICINE
Payer: MEDICARE

## 2020-01-15 VITALS
OXYGEN SATURATION: 94 % | WEIGHT: 148.15 LBS | BODY MASS INDEX: 20.07 KG/M2 | SYSTOLIC BLOOD PRESSURE: 112 MMHG | HEART RATE: 70 BPM | RESPIRATION RATE: 16 BRPM | TEMPERATURE: 98.6 F | DIASTOLIC BLOOD PRESSURE: 74 MMHG | HEIGHT: 72 IN

## 2020-01-15 LAB
GLUCOSE BLD-MCNC: 126 MG/DL (ref 65–99)
GLUCOSE BLD-MCNC: 91 MG/DL (ref 65–99)
GLUCOSE BLD-MCNC: 93 MG/DL (ref 65–99)

## 2020-01-15 PROCEDURE — 82962 GLUCOSE BLOOD TEST: CPT

## 2020-01-15 PROCEDURE — A9270 NON-COVERED ITEM OR SERVICE: HCPCS | Performed by: INTERNAL MEDICINE

## 2020-01-15 PROCEDURE — 700102 HCHG RX REV CODE 250 W/ 637 OVERRIDE(OP): Performed by: STUDENT IN AN ORGANIZED HEALTH CARE EDUCATION/TRAINING PROGRAM

## 2020-01-15 PROCEDURE — 700102 HCHG RX REV CODE 250 W/ 637 OVERRIDE(OP): Performed by: INTERNAL MEDICINE

## 2020-01-15 PROCEDURE — 92526 ORAL FUNCTION THERAPY: CPT

## 2020-01-15 PROCEDURE — 700111 HCHG RX REV CODE 636 W/ 250 OVERRIDE (IP): Performed by: STUDENT IN AN ORGANIZED HEALTH CARE EDUCATION/TRAINING PROGRAM

## 2020-01-15 PROCEDURE — A9270 NON-COVERED ITEM OR SERVICE: HCPCS | Performed by: STUDENT IN AN ORGANIZED HEALTH CARE EDUCATION/TRAINING PROGRAM

## 2020-01-15 PROCEDURE — 700101 HCHG RX REV CODE 250: Performed by: STUDENT IN AN ORGANIZED HEALTH CARE EDUCATION/TRAINING PROGRAM

## 2020-01-15 PROCEDURE — 99239 HOSP IP/OBS DSCHRG MGMT >30: CPT | Performed by: INTERNAL MEDICINE

## 2020-01-15 RX ORDER — NICOTINE 21 MG/24HR
1 PATCH, TRANSDERMAL 24 HOURS TRANSDERMAL EVERY 24 HOURS
Qty: 14 PATCH | Refills: 0 | Status: SHIPPED | OUTPATIENT
Start: 2020-01-15 | End: 2020-01-29

## 2020-01-15 RX ORDER — ERGOCALCIFEROL 1.25 MG/1
50000 CAPSULE ORAL
Qty: 5 CAP | Refills: 0 | Status: SHIPPED | OUTPATIENT
Start: 2020-01-20 | End: 2020-01-15

## 2020-01-15 RX ORDER — ERGOCALCIFEROL 1.25 MG/1
50000 CAPSULE ORAL
Qty: 5 CAP | Refills: 0 | Status: SHIPPED | OUTPATIENT
Start: 2020-01-20

## 2020-01-15 RX ORDER — NICOTINE 21 MG/24HR
1 PATCH, TRANSDERMAL 24 HOURS TRANSDERMAL EVERY 24 HOURS
Qty: 14 PATCH | Refills: 0 | Status: SHIPPED | OUTPATIENT
Start: 2020-01-15 | End: 2020-01-15

## 2020-01-15 RX ORDER — ACETAMINOPHEN 500 MG
500-1000 TABLET ORAL 3 TIMES DAILY
Qty: 30 TAB | Refills: 0 | Status: SHIPPED | OUTPATIENT
Start: 2020-01-15 | End: 2020-01-15

## 2020-01-15 RX ORDER — LANOLIN ALCOHOL/MO/W.PET/CERES
100 CREAM (GRAM) TOPICAL DAILY
Qty: 30 TAB | Refills: 0 | Status: SHIPPED | OUTPATIENT
Start: 2020-01-16 | End: 2020-01-15

## 2020-01-15 RX ORDER — DRONABINOL 2.5 MG/1
2.5 CAPSULE ORAL 2 TIMES DAILY
Qty: 14 CAP | Refills: 0 | Status: SHIPPED | OUTPATIENT
Start: 2020-01-15 | End: 2020-01-15

## 2020-01-15 RX ORDER — LANOLIN ALCOHOL/MO/W.PET/CERES
100 CREAM (GRAM) TOPICAL DAILY
Qty: 30 TAB | Refills: 0 | Status: SHIPPED | OUTPATIENT
Start: 2020-01-16 | End: 2020-02-15

## 2020-01-15 RX ORDER — DRONABINOL 2.5 MG/1
2.5 CAPSULE ORAL 2 TIMES DAILY
Qty: 28 CAP | Refills: 0 | Status: SHIPPED | OUTPATIENT
Start: 2020-01-15 | End: 2020-01-29

## 2020-01-15 RX ORDER — ACETAMINOPHEN 500 MG
500-1000 TABLET ORAL 3 TIMES DAILY
Qty: 30 TAB | Refills: 0 | Status: SHIPPED | OUTPATIENT
Start: 2020-01-15 | End: 2020-01-20

## 2020-01-15 RX ADMIN — DRONABINOL 2.5 MG: 2.5 CAPSULE ORAL at 17:05

## 2020-01-15 RX ADMIN — LIDOCAINE 1 PATCH: 50 PATCH TOPICAL at 05:52

## 2020-01-15 RX ADMIN — Medication 100 MG: at 05:54

## 2020-01-15 RX ADMIN — OXYCODONE HYDROCHLORIDE 5 MG: 5 TABLET ORAL at 05:51

## 2020-01-15 RX ADMIN — ENOXAPARIN SODIUM 40 MG: 100 INJECTION SUBCUTANEOUS at 05:55

## 2020-01-15 RX ADMIN — ACETAMINOPHEN 1000 MG: 500 TABLET, FILM COATED ORAL at 17:05

## 2020-01-15 RX ADMIN — OMEPRAZOLE 40 MG: KIT at 05:52

## 2020-01-15 RX ADMIN — ACETAMINOPHEN 1000 MG: 500 TABLET, FILM COATED ORAL at 11:09

## 2020-01-15 RX ADMIN — ACETAMINOPHEN 1000 MG: 500 TABLET, FILM COATED ORAL at 05:54

## 2020-01-15 RX ADMIN — DRONABINOL 2.5 MG: 2.5 CAPSULE ORAL at 11:09

## 2020-01-15 NOTE — DISCHARGE PLANNING
[facility] Acute Rehabilitation Transitional Care Coordination     Physiatry consult complete. Dr Hauser recommending skilled nursing for post acute care.

## 2020-01-15 NOTE — DISCHARGE SUMMARY
Internal Medicine Discharge Summary     Date of Admission: 2020  Date of Discharge: 01/15/2020  Service: [facility]  Attending Physician: Evette Pineda  Senior Resident: Karan  Intern: Reese    Discharge Diagnoses:   1. Acute pancreatitis   2. Dysphagia, resolved   3. Malnutrition, severe   4. Chronic back pain   5. Pancytopenia, likely chronic and stable   6. Acute kidney injury, resolved       Procedures and Imagin/6/20 MRCP:   1.  Acute pancreatitis  2.  Cholelithiasis. There is biliary dilation without presence of common bile duct stone. Therefore, dilation is likely secondary to pancreatic head edema producing partial obstruction.  3.  Morphologic changes of the liver consistent with diffuse hepatocellular disease and probable cirrhosis    20 Echo (due because of mildly elevated troponin related to acute illness): No prior study is available for comparison.   Technically difficult study - adequate information is obtained  Left ventricular ejection fraction is visually estimated to be 55%.  Calcified aortic valve leaflets.  Mild mitral annular calcification.  Mild tricuspid regurgitation.  Right ventricular systolic pressure is estimated to be 40 mmHg.  Dilated inferior vena cava with inspiratory collapse.       Consultants:   Surgery - Dr. Vela   PM&R - Dr. Hauser     History of Present Illness:   From HPI:   Vane Lees is a 75 yo gentleman with history of alcohol abuse was transferred from [facility] to [facility] for higher level care for N/V/D and abdominal pain.      Patient was in usual state of health until 2 weeks ago when he began to have epigastric abdominal pain and symptoms of nausea, vomiting and non-bloody diarrhea. He denies chest pain, shortness of breath, fever, cough, urinary changes. Patient does have chronic lower back pain for which he takes 200mg advil per day. He has history of alcohol use and reports he drinks at least 1 pint vodka per day,  last drink was 2 weeks ago, and no hx of alcohol withdraw/seizure.      Lab review from [facility], showed T bili 2.1, hypoglycemia 53, ammonia 41, lipase 1961, lactic 8.5-> 5.6 ( 1.9 at [facility]), plt 142, Hgb 11.6. His vitals were unremarkable in ED. Labs in [facility] showed lactic 1.9, no leukocytosis but with bandemia, potassium, hypoglycemia 65, elevated ast 115, lipase 1958, troponin 36-> 40.     Hospital Course by Problem:   1. Acute pancreatitis - present on admission. Likely due to alcohol, however gallstones also seen on imaging. Concern for possible bile duct dilation was seen on initial imaging, so patient placed on empiric antibiotics initially, which were dc'ed when repeat imaging showed gallstones/sludge for no dilation. Surgery was consulted, who given lack on ongoing obstruction recommended conservative mgmt of pancreatitis and evaluation at a later date, when patient's nutritional and functional status has improved, for consideration of gallbladder surgery. Patient's pancreatitis resolved and patient was tolerating regular diet at time of discharge.     2. Dysphagia, resolved - noted on swallow evaluation. He was given NG tube for nutrition, given acute pancreatitis. Dysphagia improved and patient had swallow evaluation on day of discharge, which showed normal function. He did not have his dentures, so patient maintained on soft diet, but can be transitioned when dentures received. See next problem.     3. Malnutrition, severe - likely longstanding, given patient's history of alcohol abuse, chronically low PO intake (per patient) and weight loss. BMI 20. Patient reported chronically low appetite, so patient started on trial of Marinol before meals. This was started 1/14, so recommend trial period to see if appetite improves and if not effective, may dc. Of note, patient mostly eats breakfast and dinner, and was consuming roughly 50% of meal trays. His NG tube was placed for nutrition, but this can be  decreased and hopefully NG tube can be removed as oral intake improves.     4. Chronic back pain - for past three years. Manages at home with as needed inbuprofen and occassional nightly oxycodone. Patient's pain was worse due to being in bed and the mattress. He was started on low dose oxycodone twice daily as needed, with good results. This was not continued at discharge in part per patient preference, who wanted to stay with tylenol.     5. Pancytopenia, likely chronic and stable - due to alcohol abuse. Hemoglobin stable during this admission: Hgb 8.5. WBC improved (4.3 --> 8.2) and platelets improved (51 --> 116) with cessation from alcohol. Patient counseled extensively on need for ongoing cessation. Expressed understanding.     6. Acute kidney injury - due to acute illness and prerenal. Resolved with IV fluids and diet. BUN/Cr 35/2.32 --> 28/1.28.     Physical Exam on Day of Discharge: BP (!) 98/63   Pulse 87   Temp 37.4 °C (99.3 °F) (Temporal)   Resp 16   Ht 1.829 m (6')   Wt 67.2 kg (148 lb 2.4 oz)   SpO2 91%   Gen: lying in bed in no acute distress with son at bedside, thin   HEENT: EOMI, mmm. NG tube in place   Resp: even and unlabored.   Abd: nondistended, no tenderness.   Ext: thin  Skin: no rashes or lesions   Psych: calm, cooperative, pleasant and appropriate     Condition at Discharge:   Stable      Disposition:   To [facility]    Discharge Medications:      Medication List      START taking these medications      Instructions   acetaminophen 500 MG Tabs  Commonly known as:  TYLENOL   Take 1-2 Tabs by mouth 3 times a day for 5 days.  Dose:  500-1,000 mg     dronabinol 2.5 MG Caps  Commonly known as:  MARINOL   Take 1 Cap by mouth 2 times a day for 14 days.  Dose:  2.5 mg     nicotine 21 MG/24HR Pt24  Commonly known as:  NICODERM   Apply 1 Patch to skin as directed every 24 hours for 14 days.  Dose:  1 Patch     nicotine polacrilex 2 MG Gum  Commonly known as:  NICORETTE   Take 1 Each by mouth  every 1 hour as needed for Smoking Cessation (For nicotine urge) for up to 30 days.  Dose:  2 mg     omeprazole 2 mg/mL Susp  Commonly known as:  FIRST-OMEPRAZOLE   Doctor's comments:  PO  20 mL by Enteral Tube route every 12 hours for 14 days.  Dose:  40 mg     thiamine 100 MG tablet  Start taking on:  January 16, 2020  Commonly known as:  THIAMINE   Take 1 Tab by mouth every day for 30 days.  Dose:  100 mg     vitamin D (Ergocalciferol) 59485 units Caps capsule  Start taking on:  January 20, 2020  Commonly known as:  DRISDOL   Take 1 Cap by mouth every 7 days.  Dose:  50,000 Units        STOP taking these medications    ibuprofen 200 MG Tabs  Commonly known as:  MOTRIN              Follow-up:   Follow up with MDs at rehab facility.   Recommend follow up with general surgery for consideration of cholecystectomy once functional status improves    Time Spent on Discharge: 40 minutes

## 2020-01-15 NOTE — PROGRESS NOTES
Internal Medicine Interval Note  Note Author: Chas Pastor M.D.     Name Slava Lees 1945   Age/Sex 74 y.o. male   MRN [id number]   Code Status DNAR/DNI     After 5PM or if no immediate response to page, please call for cross-coverage  Attending/Team: Dr Alas/Shyam See Patient List for primary contact information  Call [phone] to page    1st Call - Day Intern (R1):   Dr Alejandre 2nd Call - Day Sr. Resident (R2/R3):   Dr Russo         Reason for interval visit  (Principal Problem)   Acute pancreatitis, malnutrition    Interval Problem Daily Status Update  (24 hours, problem oriented, brief subjective history, new lab/imaging data pertinent to that problem)   A 74-year-old male with past medical history of chronic low back pain, alcohol abuse was transferred from [facility] for higher level of care on  with complaints of epigastric pain, nausea, vomiting, diarrhea for 2 weeks.  Labs and CT abdomen without contrast showed mesenteric edema, luminal debris sludge and small stones in gallbladder fundus without wall thickening are visualized calcified duct stones.  MRI showed acute pancreatitis, cholelithiasis with biliary dilatation without CBD stone diffuse hepatocellular disease?  Cirrhosis and small amount of ascites.    Overnight patient reporting worsening lower back pain. He receive a one time dose of IV morphine. PM&R evaluated patient for which they recommended SNF on discharge. Meal consumption still poor for which he was started on Dronabinol. No dyspnea, chest pain, nausea, vomiting, diarrhea.  Last BM this AM. Patient on tube feeds.     Review of Systems   Constitutional: Positive for weight loss. Negative for chills, fever and malaise/fatigue.   HENT: Negative for ear discharge, ear pain, hearing loss, sore throat and tinnitus.    Eyes: Negative for blurred vision, double vision, photophobia, pain and redness.   Respiratory: Negative  for cough, hemoptysis, sputum production, shortness of breath, wheezing and stridor.    Cardiovascular: Negative for chest pain, palpitations, orthopnea, claudication and leg swelling.   Gastrointestinal: Negative for  abdominal pain, constipation, diarrhea, heartburn, nausea and vomiting.   Genitourinary: Negative for dysuria, flank pain, frequency, hematuria and urgency.   Musculoskeletal: Positive for back pain. Negative for joint pain, myalgias and neck pain.   Skin: Negative for itching and rash.   Neurological: Positive for weakness. Negative for dizziness, tingling, tremors, sensory change and headaches.   Endo/Heme/Allergies: Negative for polydipsia. Does not bruise/bleed easily.     Disposition/Barriers to discharge:   Inpatient/Acute pancreatitis , malnutrition.    Consultants/Specialty  Physiatry    PCP: No primary care provider on file.      Quality Measures  Quality-Core Measures   Reviewed items::  Labs reviewed, Medications reviewed and Radiology images reviewed  DVT prophylaxis - SCD's and lovenox.      Physical Exam       Vitals:    01/13/20 1540 01/13/20 1915 01/14/20 0310 01/14/20 0725   BP: 100/69 (!) 95/58 116/65 143/85   Pulse: 87 88 83 (!) 108   Resp: 16 16 18 17   Temp: 36.6 °C (97.8 °F) 36.6 °C (97.8 °F) 37.4 °C (99.3 °F) 37.2 °C (99 °F)   TempSrc: Temporal Temporal Temporal Temporal   SpO2: 95% 94% 91% 92%   Weight:       Height:         Body mass index is 20.09 kg/m².    Oxygen Therapy:  Pulse Oximetry: 92 %, O2 (LPM): 0, O2 Delivery: None (Room Air)    Physical Exam   Constitutional: He is oriented to person, place, and time and in no distress.   HENT:   Head: Normocephalic and atraumatic.    Eyes: Pupils are equal, round, and reactive to light. Conjunctivae and EOM are normal. Right eye exhibits no discharge. No scleral icterus.   Neck: Normal range of motion. Neck supple. No tracheal deviation present. No thyromegaly present.   Cardiovascular: Normal rate, regular rhythm and normal  heart sounds. Exam reveals no gallop. No murmur heard.  Pulmonary/Chest: Effort normal and breath sounds normal. No respiratory distress. He has no wheezes. He has no rales.   Abdominal: Soft. Bowel sounds are normal. He exhibits no distension and no mass.There is no tenderness, no rebound and no guarding.   Musculoskeletal: mild tenderness over the left side of the lumbar area.     General: No tenderness, deformity or edema.   Lymphadenopathy: He has no cervical adenopathy.   Neurological: He is alert and oriented to person, place, and time. No cranial nerve deficit. Gait normal. Coordination normal. GCS score is 15.   Skin: Skin is warm and dry. No rash noted. He is not diaphoretic. No erythema. No pallor.        Assessment/Plan     * Acute pancreatitis  Assessment & Plan  74-year-old male admitted with history  of alcohol abuse and epigastric pain, nausea, vomiting, diarrhea from 2 weeks and with elevated lipase in the 1900 on admission  CT abdomen without contrast from outside facility showed small volume high density sludge and small stone in gallbladder fundus without thickening, visualized calcified CBD stone  Blood cultures done at [facility] pending.  Called the hospital and they are not ready yet.  I will call again tomorrow  MRI showed acute pancreatitis, cholelithiasis with biliary dilatation without CBD stone, diffuse hepatocellular disease?  Cirrhosis and small amount of ascites  2/2 ETOH abouse  No ABD pain today  CTM    Malnutrition (HCC)  Assessment & Plan  Poor appetite  Initiation of tube feeds per nutritions recommendations  Pending SPL evaluation for swallow  Continue tube feeds  Nutrition and SPL following    Alcohol abuse  Assessment & Plan  Patient with h/o alcohol abuse and developed acute pancreatitis.  PO thiamine    Pancytopenia (HCC)  Assessment & Plan  Patient admitted for acute pancreatitis secondary to ?alcohol labs showed pancytopenia   Plan monitor labs closely  CBC with  differential daily  Folate, B12, Iron/ transferrin.  For type and crossmatch  Transfuse if necessary    Electrolyte abnormality  Assessment & Plan  Replete as needed      Cholelithiasis  Assessment & Plan  As seen on CT abdomen from outside hospital  MRI showed acute pancreatitis, cholelithiasis with biliary dilatation without CBD stone, diffuse hepatocellular disease?  Cirrhosis and small amount of ascites.  As per Surg, Hold off on surgery for now, pt needs to get stronger

## 2020-01-15 NOTE — DOCUMENTATION QUERY
[facility]                                                                       Query Response Note      PATIENT:               JOSHUA ZEPEDA  ACCT #:                  [id number]  MRN:                     [id number]  :                      1945  ADMIT DATE:       2020 8:38 PM  DISCH DATE:          RESPONDING  PROVIDER #:        [id number]           QUERY TEXT:    The diagnosis of Acute kidney injury with Acute tubular necrosis is documented in the resident's progress notes. Please provide additional clinical indicators supportive of the documented diagnosis of Acute tubular necrosis.    Please provide amended documentation in your next progress note as applicable.    The patient's Clinical Indicators include:  Noted:   Acute kidney failure with acute tubular necrosis  Likely pre-renal due to poor oral intake and diarrhea  Down trending  GFR: 27 - >60 (WNL)  Options provided:   -- ATN exists and additional clinical indicators documented in the medical record   -- ATN does not exist and amended documentation provided in the medical record   -- Unable to provide additional clarity regarding the diagnosis   -- Unable to determine      Query created by: Stella Ohara on 2020 11:48 AM    RESPONSE TEXT:    Acute kidney injury          Electronically signed by:  VIV LARA MD 2020 9:15 PM

## 2020-01-15 NOTE — ASSESSMENT & PLAN NOTE
Poor appetite  For dronabinol  SPL evaluation for swallow passed.  For regular diet  Accepted by [facility] for discharge.

## 2020-01-15 NOTE — DISCHARGE INSTRUCTIONS
Discharge Instructions    Discharged to other by medical transportation with self. Discharged via wheelchair, hospital escort: Yes.  Special equipment needed: Not Applicable    Be sure to schedule a follow-up appointment with your primary care doctor or any specialists as instructed.     Discharge Plan:   Activity Level: Discussed  Smoking Cessation Offered: Patient Refused  Confirmed Follow up Appointment: Appointment Scheduled  Confirmed Symptoms Management: Discussed  Medication Reconciliation Updated: Yes  Influenza Vaccine Indication: Patient Refuses    I understand that a diet low in cholesterol, fat, and sodium is recommended for good health. Unless I have been given specific instructions below for another diet, I accept this instruction as my diet prescription.   Other diet: general    Special Instructions: None    · Is patient discharged on Warfarin / Coumadin?   No     Depression / Suicide Risk    As you are discharged from this [facility] facility, it is important to learn how to keep safe from harming yourself.    Recognize the warning signs:  · Abrupt changes in personality, positive or negative- including increase in energy   · Giving away possessions  · Change in eating patterns- significant weight changes-  positive or negative  · Change in sleeping patterns- unable to sleep or sleeping all the time   · Unwillingness or inability to communicate  · Depression  · Unusual sadness, discouragement and loneliness  · Talk of wanting to die  · Neglect of personal appearance   · Rebelliousness- reckless behavior  · Withdrawal from people/activities they love  · Confusion- inability to concentrate     If you or a loved one observes any of these behaviors or has concerns about self-harm, here's what you can do:  · Talk about it- your feelings and reasons for harming yourself  · Remove any means that you might use to hurt yourself (examples: pills, rope, extension cords, firearm)  · Get professional help from  the community (Mental Health, Substance Abuse, psychological counseling)  · Do not be alone:Call your Safe Contact- someone whom you trust who will be there for you.  · Call your local CRISIS HOTLINE 413-8651 or 465-446-1338  · Call your local Children's Mobile Crisis Response Team [place] (175) 136-7911 or www.[employer]  · Call the toll free National Suicide Prevention Hotlines   · National Suicide Prevention Lifeline 391-337-TKWA (0481)  · National Hope Line Network 800-SUICIDE (398-8591)    Malnutrition  Introduction  Malnutrition is any condition in which nutrition is poor. There are many forms of malnutrition. A common form is having too little of one kind of nutrient (nutritional deficiency). Nutrients include proteins, minerals, carbohydrates, fats, and vitamins. They provide the body with energy and keep the body working normally.  Malnutrition ranges from mild to severe. The condition affects the body's defense system (immune system). Because of this, people who are malnourished are more likely to develop health problems and get sick.  What are the causes?  Causes of malnutrition include:  · Eating an unbalanced diet.  · Eating too much of certain foods.  · Eating too little.  · Conditions that decrease the body's ability to use nutrients.  What increases the risk?  Risk factors include:  · Pregnancy and lactation. Women who are pregnant may become malnourished if they do not increase their nutrient intake. They are also susceptible to folic acid deficiency.  · Increasing age. The body's ability to absorb nutrients decreases with age. This can contribute to iron, calcium, and vitamin D deficiencies.  · Alcohol or drug dependency. Addiction often leads to a lifestyle in which proper nourishment is ignored. Dependency can also hurt the metabolism and the body's ability to absorb nutrients. Alcoholism is a major cause of thiamine deficiency and can lead to deficiencies of magnesium, zinc, and  "other vitamins.  · Eating disorders, such as anorexia nervosa. People with these disorders may eat too little or too much.  · Chewing or swallowing problems. People with these disorders may not eat enough.  · Certain diseases, including:  ¨ Long-lasting (chronic) diseases. Chronic diseases tend to affect the absorption of calcium, iron, and vitamins B12, A, D, E, and K.  ¨ Liver disease. Liver disease affects the storage of vitamins A and B12. It also interferes with the metabolism of protein and energy sources.  ¨ Kidney disease. Kidney disease may cause deficiencies of protein, iron, and vitamin D.  ¨ Cancer or AIDS. These diseases can cause a loss of appetite.  ¨ Cystic fibrosis. This disease can make it difficult for the body to absorb nutrients.  · Certain diets, including.  ¨ The vegetarian diet. Vegetarians are at risk for iron deficiency.  ¨ The vegan diet. Vegans are susceptible to vitamin B12, calcium, iron, vitamin D, and zinc deficiencies.  ¨ The fruitarian diet. This diet can be deficient in protein, sodium, and many micronutrients.  ¨ Many commercial \"fad\" diets, including those that claim to enhance well-being and reduce weight.  ¨ Very low calorie diets.  · Low income. People with a low income may have trouble paying for nutritious foods.  What are the signs or symptoms?  Signs and symptoms depend on the kind of malnutrition you have. Common symptoms include:  · Fatigue.  · Weakness.  · Dizziness.  · Fainting  · Weight loss.  · Poor immune response.  · Lack of menstruation.  · Hair loss.  · Poor memory.  How is this diagnosed?  Malnutrition may be diagnosed by:  · A medical history.  · A dietary history.  · A physical exam. This may include a measurement of your body mass index (BMI).  · Blood tests.  How is this treated?  Treatments vary depending on the cause of the malnutrition. Common treatments include:  · Dietary changes.  · Dietary supplements, such as vitamins and minerals.  · Treatment of " any underlying conditions.  Follow these instructions at home:  · Eat a balanced diet.  · Take dietary supplements as directed by your health care provider.  · Exercise regularly. Exercising can improve appetite.  · Keep all follow-up visits as directed by your health care provider. This is important.  How is this prevented?  Eating a well-balanced diet helps to prevent most forms of malnutrition.  Contact a health care provider if:  · You have increased weakness or fatigue.  · You faint.  · You stop menstruating.  · You have rapid hair loss.  · You have unexpected weight loss.  This information is not intended to replace advice given to you by your health care provider. Make sure you discuss any questions you have with your health care provider.  Document Released: 11/03/2006 Document Revised: 05/25/2017 Document Reviewed: 08/14/2015  © 2017 [employer]

## 2020-01-15 NOTE — DISCHARGE PLANNING
Anticipated Discharge Disposition: skilled    Action: Pt to transfer to [facility] today 1/15/20 at 17:00. Pt and son made aware. HUMBERTO Sood aware. MD wrote pt’s RXS and included in dc packet. Requested MD write RX for DNR-attched with other RXS. COBRA signed and chart copy placed at [place] station.       Barriers to Discharge:     Plan: to [place] at 17:00.

## 2020-01-15 NOTE — CARE PLAN
Problem: Safety  Goal: Will remain free from falls  Outcome: PROGRESSING AS EXPECTED  Intervention: Implement fall precautions  Note:   Pt does not call for assistance at times. Bed alarm in use and working properly.      Problem: Discharge Barriers/Planning  Goal: Patient's continuum of care needs will be met  Outcome: PROGRESSING AS EXPECTED  Intervention: Assess potential discharge barriers on admission and throughout hospital stay  Note:   Pt medially cleared, swatting for acceptance to [facility].     Problem: Mobility  Goal: Risk for activity intolerance will decrease  Outcome: PROGRESSING AS EXPECTED  Intervention: Encourage patient to increase activity level in collaboration with Interdisciplinary Team  Note:   Pt ambulatory to bathroom with 1x assist.

## 2020-01-15 NOTE — CARE PLAN
Problem: Safety  Goal: Will remain free from falls  Outcome: PROGRESSING AS EXPECTED     Problem: Infection  Goal: Will remain free from infection  Outcome: PROGRESSING AS EXPECTED  Note:   Remains free of falls and injury.     Problem: Pain Management  Goal: Pain level will decrease to patient's comfort goal  Outcome: PROGRESSING SLOWER THAN EXPECTED  Note:   Patient continues to have uncontrolled back pain at night. Patient states he hasn't slept much at night.

## 2020-01-15 NOTE — DISCHARGE PLANNING
Anticipated Discharge Disposition: [facility]    Action: LSW met with pt and son at bedside to provide update. LSW informed that [facility] pending and [facility] accepted.     LSW contacted [facility] again and stated that pt is medically clear and needs an answer regarding acceptance. Pt declined due to no open beds.    LSW spoke with [facility] team and confirmed pt medically clear and can transfer to [facility].     [facility] set up transport for 1700. LSW informed [facility] MD.    Barriers to Discharge: None    Plan: Complete transfer packet when dc summary complete.

## 2020-01-15 NOTE — PROGRESS NOTES
Received change of shift report from day-shift RN and assumed care of patient at 1900. Assessment performed. Patient is alert and oriented x4. Pt denies any pain or discomfort at this time. Tube feed running Diabetisource at 85 ml/h, pt sitting up in bed. Pt encouraged to voice feelings and call for assistance using call light. Patient call light within reach, personal possessions nearby, bed in low position and locked, hourly rounding in practice, non-skid socks and bed alarm in use.

## 2020-01-15 NOTE — DISCHARGE PLANNING
Agency/Facility Name: [facility]  Spoke To: Viv  Outcome: Informed of transport time.    Received Transport Form @ 1200  Spoke to Sravan @ [facility]    Transport is scheduled for 1/15 @1700 going to [facility].  SW informed.    @1200  Agency/Facility Name: [facility]  Spoke To: Viv  Outcome: Has a bed but no transport.    @1110  Agency/Facility Name: [facility]  Outcome: Left message, awaiting call back.    @0920  Agency/Facility Name: [facility]  Outcome: Left message, awaiting call back.

## 2020-01-15 NOTE — CARE PLAN
Problem: Safety  Goal: Will remain free from falls  Outcome: PROGRESSING AS EXPECTED  All fall interventions in place     Problem: Pain Management  Goal: Pain level will decrease to patient's comfort goal  Outcome: PROGRESSING SLOWER THAN EXPECTED  Pt with uncontrolled pain and muscle spasms

## 2020-01-15 NOTE — PROGRESS NOTES
Pt accidentally removed tube feed. Tube feed reinserted. Awaiting x-ray for placement verification.

## 2020-01-15 NOTE — PROGRESS NOTES
Internal Medicine Interval Note  Note Author: Sanya Alejandre M.D.     Name Slava Lees 1945   Age/Sex 74 y.o. male   MRN [id number]   Code Status DNAR/DNI      After 5PM or if no immediate response to page, please call for cross-coverage  Attending/Team: Dr Alas/Shyam See Patient List for primary contact information  Call [phone] to page    1st Call - Day Intern (R1):   Dr Alejandre 2nd Call - Day Sr. Resident (R2/R3):   Dr Russo       Reason for interval visit  (Principal Problem)   Acute pancreatitis, malnutrition      Interval Problem Daily Status Update  (24 hours, problem oriented, brief subjective history, new lab/imaging data pertinent to that problem)   Overnight patient having no new complaints but he still have low back pain on the left side.  Denies any shortness of breath, chest pain, abdominal pain, swelling of the legs, nausea, vomiting, diarrhea,.  He had a bowel movement this morning.  He tolerated oral feeding but only 50% last.  He is still on tube feeding 85 mL/h but only at night.  For discharge this evening to [facility].    Review of Systems   Constitutional: Positive for weight loss. Negative for chills, fever and malaise/fatigue.   HENT: Negative for ear discharge, ear pain, hearing loss, sore throat and tinnitus.    Eyes: Negative for blurred vision, double vision, photophobia, pain and redness.   Respiratory: Negative for cough, hemoptysis, sputum production, shortness of breath, wheezing and stridor.    Cardiovascular: Negative for chest pain, palpitations, orthopnea, claudication and leg swelling.   Gastrointestinal: Negative for  abdominal pain, constipation, diarrhea, heartburn, nausea and vomiting.   Genitourinary: Negative for dysuria, flank pain, frequency, hematuria and urgency.   Musculoskeletal: Positive for back pain. Negative for joint pain, myalgias and neck pain.   Skin: Negative for itching and rash.   Neurological:  Positive for weakness. Negative for dizziness, tingling, tremors, sensory change and headaches.   Endo/Heme/Allergies: Negative for polydipsia. Does not bruise/bleed easily.     Disposition/Barriers to discharge:   Accepted by [facility] for discharge this evening at 5.    Consultants/Specialty  None  PCP: No primary care provider on file.      Quality Measures  Quality-Core Measures   Reviewed items::  Labs reviewed, Medications reviewed and Radiology images reviewed  DVT prophylaxis - SCD's and lovenox.      Physical Exam       Vitals:    01/14/20 1620 01/14/20 1925 01/15/20 0315 01/15/20 0725   BP: 112/63 (!) 94/57 133/52 (!) 98/63   Pulse: 76 74 95 87   Resp: 17 18 16 16   Temp: 36.6 °C (97.9 °F) 36.7 °C (98.1 °F) 37 °C (98.6 °F) 37.4 °C (99.3 °F)   TempSrc: Temporal Temporal Temporal Temporal   SpO2: 90% 92% 95% 91%   Weight:       Height:         Body mass index is 20.09 kg/m².    Oxygen Therapy:  Pulse Oximetry: 91 %, O2 (LPM): 0, O2 Delivery: None (Room Air)      Physical Exam   Constitutional: He is oriented to person, place, and time and in no distress.   HENT:   Head: Normocephalic and atraumatic.    Eyes: Pupils are equal, round, and reactive to light. Conjunctivae and EOM are normal. Right eye exhibits no discharge. No scleral icterus.   Neck: Normal range of motion. Neck supple. No tracheal deviation present. No thyromegaly present.   Cardiovascular: Normal rate, regular rhythm and normal heart sounds. Exam reveals no gallop. No murmur heard.  Pulmonary/Chest: Effort normal and breath sounds normal. No respiratory distress. He has no wheezes. He has no rales.   Abdominal: Soft. Bowel sounds are normal. He exhibits no distension and no mass.There is no tenderness, no rebound and no guarding.   Musculoskeletal: mild tenderness over the left side of the lumbar area.     General: No tenderness, deformity or edema.   Lymphadenopathy: He has no cervical adenopathy.   Neurological: He  is alert and oriented to person, place, and time. No cranial nerve deficit. Gait normal. Coordination normal. GCS score is 15.   Skin: Skin is warm and dry. No rash noted. He is not diaphoretic. No erythema. No pallor.      Assessment/Plan     * Acute pancreatitis  Assessment & Plan  74-year-old male admitted with history  of alcohol abuse and epigastric pain, nausea, vomiting, diarrhea from 2 weeks and with elevated lipase in the 1900 on admission  CT abdomen without contrast from outside facility showed small volume high density sludge and small stone in gallbladder fundus without thickening, visualized calcified CBD stone  Blood cultures done at [facility] pending.  Called the hospital and they are not ready yet.  I will call again tomorrow  MRI showed acute pancreatitis, cholelithiasis with biliary dilatation without CBD stone, diffuse hepatocellular disease?  Cirrhosis and small amount of ascites  2/2 ETOH abouse  No ABD pain today  To discharge to [facility]    Malnutrition (HCC)  Assessment & Plan  Poor appetite  For dronabinol  SPL evaluation for swallow passed.  For regular diet  Accepted by [facility] for discharge.    Alcohol abuse  Assessment & Plan  Patient with h/o alcohol abuse and developed acute pancreatitis.  PO thiamine    Pancytopenia (HCC)  Assessment & Plan  Patient admitted for acute pancreatitis secondary to ?alcohol labs showed pancytopenia   Stable    Electrolyte abnormality  Assessment & Plan  Resolved      Cholelithiasis  Assessment & Plan  As seen on CT abdomen from outside hospital  MRI showed acute pancreatitis, cholelithiasis with biliary dilatation without CBD stone, diffuse hepatocellular disease?  Cirrhosis and small amount of ascites.  As per Surg, Hold off on surgery for now, pt needs to get stronger

## 2020-01-16 NOTE — PROGRESS NOTES
Discharged to [facility]. Report given to Ashley at [facility]. Cortrak and IV removed. Cobra given to transporter. All belongings with patient. Sons of patient aware of transfer.

## 2020-08-19 ENCOUNTER — APPOINTMENT (RX ONLY)
Dept: URBAN - NONMETROPOLITAN AREA CLINIC 1 | Facility: CLINIC | Age: 75
Setting detail: DERMATOLOGY
End: 2020-08-19

## 2020-08-19 DIAGNOSIS — L57.0 ACTINIC KERATOSIS: ICD-10-CM

## 2020-08-19 DIAGNOSIS — L20.89 OTHER ATOPIC DERMATITIS: ICD-10-CM

## 2020-08-19 DIAGNOSIS — F17.200 NICOTINE DEPENDENCE, UNSPECIFIED, UNCOMPLICATED: ICD-10-CM

## 2020-08-19 DIAGNOSIS — Z85.828 PERSONAL HISTORY OF OTHER MALIGNANT NEOPLASM OF SKIN: ICD-10-CM

## 2020-08-19 PROBLEM — L20.84 INTRINSIC (ALLERGIC) ECZEMA: Status: ACTIVE | Noted: 2020-08-19

## 2020-08-19 PROCEDURE — ? SMOKING CESSATION COUNSELING

## 2020-08-19 PROCEDURE — ? LIQUID NITROGEN

## 2020-08-19 PROCEDURE — ? PRESCRIPTION

## 2020-08-19 PROCEDURE — ? TREATMENT REGIMEN

## 2020-08-19 PROCEDURE — ? VITAMIN B3 COUNSELING

## 2020-08-19 PROCEDURE — ? COUNSELING

## 2020-08-19 PROCEDURE — 17000 DESTRUCT PREMALG LESION: CPT

## 2020-08-19 PROCEDURE — 17003 DESTRUCT PREMALG LES 2-14: CPT

## 2020-08-19 PROCEDURE — 99214 OFFICE O/P EST MOD 30 MIN: CPT | Mod: 25

## 2020-08-19 PROCEDURE — 99406 BEHAV CHNG SMOKING 3-10 MIN: CPT

## 2020-08-19 RX ORDER — TRIAMCINOLONE ACETONIDE 1 MG/G
CREAM TOPICAL BID
Qty: 1 | Refills: 3 | Status: ERX | COMMUNITY
Start: 2020-08-19

## 2020-08-19 RX ADMIN — TRIAMCINOLONE ACETONIDE 1: 1 CREAM TOPICAL at 00:00

## 2020-08-19 ASSESSMENT — LOCATION ZONE DERM
LOCATION ZONE: TRUNK
LOCATION ZONE: FACE
LOCATION ZONE: ARM

## 2020-08-19 ASSESSMENT — LOCATION DETAILED DESCRIPTION DERM
LOCATION DETAILED: LEFT SUPERIOR UPPER BACK
LOCATION DETAILED: RIGHT SUPERIOR UPPER BACK
LOCATION DETAILED: LEFT CENTRAL MALAR CHEEK
LOCATION DETAILED: LEFT ELBOW
LOCATION DETAILED: RIGHT MEDIAL MALAR CHEEK
LOCATION DETAILED: RIGHT SUPERIOR CENTRAL MALAR CHEEK
LOCATION DETAILED: LEFT SUPERIOR LATERAL MALAR CHEEK
LOCATION DETAILED: LEFT SUPERIOR CENTRAL MALAR CHEEK
LOCATION DETAILED: RIGHT ELBOW
LOCATION DETAILED: RIGHT INFERIOR CENTRAL MALAR CHEEK

## 2020-08-19 ASSESSMENT — LOCATION SIMPLE DESCRIPTION DERM
LOCATION SIMPLE: RIGHT UPPER BACK
LOCATION SIMPLE: LEFT ELBOW
LOCATION SIMPLE: RIGHT CHEEK
LOCATION SIMPLE: RIGHT ELBOW
LOCATION SIMPLE: LEFT CHEEK
LOCATION SIMPLE: LEFT UPPER BACK

## 2024-04-29 NOTE — PROCEDURE: OBSERVATION
- Schedule x-rays of hands and left shoulder.  Call central scheduling at [phone] to schedule.  Will decide next steps for shoulder based on results.  - See if these Rheumatology groups accept your insurance and can see you sooner.  If so let us know and we can enter a new referral order:  [facility]  [address] [place] [address]  [phone]    [facility]  [address]  [place] [address]  [phone]    [facility]  [address] [place] [address]  [phone]  - Disability paperwork filled out    It was a pleasure seeing you in the clinic today.  Thank you for choosing the [facility] [place] office for your healthcare needs. Please call at [phone] with any questions or concerns.    Traci Lang MD    
Size Of Lesion In Cm (Optional): 0.5
Detail Level: Simple
Body Location Override (Optional - Billing Will Still Be Based On Selected Body Map Location If Applicable): left cheek
X Size Of Lesion In Cm (Optional): 0.7

## 2025-04-28 NOTE — ED TRIAGE NOTES
Chief Complaint   Patient presents with   • Diarrhea     Transfer from [facility], diarrhea for one week, history of chronic alcohol intake   • Low Blood Sugar     Low blood sugar prior to arrival   • Low Back Pain     Chronic lower back pain      Chronic Pain-Tele-Medicine-Established Note (Follow up visit)        The patient location is: Home  The chief complaint leading to consultation is: pain  Visit type: Virtual visit with synchronous audio and video  Total time spent with patient: 15 min  Each patient to whom he or she provides medical services by telemedicine is:  (1) informed of the relationship between the physician and patient and the respective role of any other health care provider with respect to management of the patient; and (2) notified that he or she may decline to receive medical services by telemedicine and may withdraw from such care at any time.        SUBJECTIVE:    Interval History 4/28/2025:  The patient has a virtual follow up for chronic back and knee pain. She reports increased pain since last OV. The lower back pain radiates into the buttocks but not below. There is associated numbness to the area. Her knee pain is aching and throbbing in nature. She has benefit with Percocet as needed for pain. Her pain today is 10/10.    Interval History 1/27/2025:  The patient is here for follow up of chronic back pain. She is s/p L5/S1 IL KERI with 60% relief. She is happy with the results. She has some aching back pain which she says is tolerable with medications at this time. She continues with daily PT exercises for the back and knees. She is taking Percocet as needed for pain with benefit. No refills needed today. Her pain today is 8/10.    Interval History 12/18/2024:  Patient returns to clinic for follow up of low back pain and knee pain. She has completed physical therapy for her knees and continues to do HEP as well as swimming. She continues to take Percocet four times a day, she states she was taking three to four times a day prior to surgery, and has not been able to reduce since the surgery. She denies any recent health changes. She denies recent falls or trauma. She denies new onset fever/night sweats, urinary incontinence, bowel  incontinence, significant weight changes, significant motor weakness or changes, or loss of sensations.    Her back pain is focused to the middle low back, the pain is axial in nature mainly, but can radiate down the anterior thighs to the knees. Described as sharp/shooting and aching throbbing, exacerbated by leaning back, mitigated by rest. Pain is rated as 7/10. She inquires about repeat lumbar RFA, last one is in 2/12/2024, she endorses 2.5 months of good relief.     Interval History 9/13/2024:  Alivia Thomas returns to clinic virtually for follow-up after right TKA and post-operative pain. She states she has been doing well in physical therapy biweekly and has been doing home exercises most days. She states she is healing well and hoping to progress to aquatic therapy once finished with physical therapy for her knee. She has been on Percocet 10/325 4 times per day for the last month which has helped her tolerate and progress in physical therapy. She has about 1 week left of this last prescription. She believes she could probably tolerate decreasing her dose slightly in hopes of getting down to her previous dose of Percocet 10/325 TID PRN. She denies any recent health changes. She denies recent falls or trauma. She denies new onset fever/night sweats, urinary incontinence, bowel incontinence, significant weight changes, significant motor weakness or changes, or loss of sensations. Her pain today is 7/10.     Interval History 8/16/2024:  lAivia Thomas returns to clinic for follow-up of chronic lower back pain. She is s/p right TKA revision on 7/18/2024. She continues Percocet with mild relief. She has been having increased pain since her TKA and with associated physical therapy. She is wondering if her dose could be temporarily increased. She also takes Tylenol arthritis with good relief. She denies any perceived side effects. She denies recent falls or trauma. She denies new onset fever/night sweats,  urinary incontinence, bowel incontinence, significant weight changes, significant motor weakness or changes, or loss of sensations. Her pain today is 8/10.     Interval History 5/29/2024:  Alivia Thomas returns to clinic s/p Bilateral SIJ and GTB injections on 5/16/2024.  She reports 70% relief. She is able to complete ADLs with limited pain and her quality of life is improved. She takes Percocet 10/325 TID PRN with good relief. She denies any perceived side effects. She is going to restart water aerobics at her local pool.  She is having a right TKA revision in July.  After healing from that surgery is complete, she would like to start aquatic therapy for her back. The patient denies fever/night sweats, urinary incontinence, bowel incontinence, significant weight changes, significant motor weakness or changes, or loss of sensations. Her pain today is 4/10.    Interval History 4/29/2024:  Alivia Thomas returns today for follow-up of back pain and right knee pain.  She denies any radicular pain.  She notices bilateral lower back pain with pain into the buttocks and lateral thighs. She takes Percocet TID PRN with good relief.  She denies any perceived side effects. Her pharmacy has been out of the medication and she has been out since 3/10/2024.  The patient denies fever/night sweats, urinary incontinence, bowel incontinence, significant weight changes, significant motor weakness or changes, or loss of sensations. Her pain today is 9/10.    Interval History 1/9/2024:  The patient returns to clinic today for follow up of back pain via virtual visit. She is s/p right L3,4,5 RFA on 11/27/2023. She reports 40-50% relief at this time. She did not have left sided RFA due to illness. Of note, she had a stroke last week. She began having a headache, left sided facial drooping, and slurring of her speech. She did go to the ER where she received TPA. She reports mild weakness into her left arm. She continues to report  low back pain. She denies any radicular leg pain. Her pain is worse with activity. She also reports bilateral knee pain, worse with standing and walking. She is scheduled for genicular RFA in the next few weeks. She will reschedule left lumbar RFA. She is taking Percocet as needed with benefit. She denies any adverse effects. She denies any other health changes.      Interval History 10/24/23:  Alivia Thomas returns today for follow-up. Since last seen, they report their pain has been worsening. She last received a toradol shot instead of an epidural as she did not wish to have an epidural.  Today, she is here for follow-up with me to evaluate.  She is requesting to increase her oxycodone to 4 times a day instead of 3 times a day.  I had a long discussion with her and advised her we either change the medication but not increase it, try repeating radiofrequency ablation since it worked well for her and/or spinal cord stimulator.  Yesterday, surgery was another alternative for managing her pain.  She had a CT scan that we went over the results.  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  Yesterday, she declined the surgery.  Today she is open to repeating the radiofrequency ablation but she does not want to change the medication.  Previously, the radiofrequency ablation lasted several months up to a year.  The last time she said it lasted about 3-4 month than the pain started coming back gradually over the following 3-4 months.  Overall, she still had good relief.  She is complaining of the right knee as well.  Much less pain on the left knee.  The radicular component of her back pain is in the dorsal thighs bilaterally worse on the right negligible on the left.  No new bowel or bladder symptomatology.  No fever, chills or night sweats.    Interval History 10/23/23: Alivia Thomas returns for follow up. This is a patient of   [facility]. Her visit with [facility] was cancelled last week so she was placed on my schedule today. At her last visit she was having pain radiating down her legs, but did not want to have an epidural. They performed a Toradol shot (short term relief) and referred her to Neurosurgery. She saw Dr. Bartlett today who said either she will need SCS or decompression. On review of her CT and MRI, she has severe facet arthropathy and severe canal stenosis at L3/4 and L4/5. She notes being able to walk <1 block, after which she must sit down. She reports 8-10/10 pain and this is interfering with her life significantly.     Interval History 9/18/2023:  The patient presents today to request an injection for increased back pain. She has been having worsened pain over the past couple of weeks. She does not wish to have another KERI at this time. She is having lower back pain with radiation down the back of both legs, R>L. She has associated numbness and tingling as well as subjective weakness. No bowel or bladder incontinence. She has not seen neurosurgery. Her pain today is 10/10.    Interval History 8/14/2023:  The patient is here for follow up after procedure for back pain. She is now s/p L5/S1 IL KERI with about 50% relief. She still has lower back pain with radiation down the back of the legs, stopping at the knees. She has associated numbness to lower extremities. Her pain worsens with walking, bending and reaching upward. She has been working on home exercises and stretches without much benefit. She has some benefit with medication as needed.  She report Her pain today is 9/10.    Interval History 7/10/2023:  The patient is here today for evaluation of increased lower back pain. She has a long-standing history of back pain which is mainly axial. She has had worsened symptoms over the past couple of weeks, most severe over the past 3 days. The pain now radiates down the back of both legs with burning and numbness. She finds it  difficult to stand or walk for any length of time. No bowel or bladder incontinence. No fever or malaise. Medications are helping somewhat. She continues with home PT exercises as previously taught in PT. Her pain today is 10/10.    Interval History 4/10/2023:  The patient returns to clinic today for follow up of low back and knee pain via virtual visit. She is s/p right L3,4,5 RFA on 3/6/2023. Her left side procedure was rescheduled due to illness. This is scheduled for May 1st. She reports some relief of her right sided low back pain. She continues to report left sided low back pain. She denies any radicular leg pain. She does endorse morning stiffness. She continues to perform a home exercise routine. She is taking Percocet as needed for severe pain. She denies any other health changes.     Interval History 1/30/2023:  The patient returns to clinic today for follow up of low back and knee pain. She is s/p right genicular RFA on 12/12/2022 and left genicular RFA on 1/9/2023. She reports 60% relief of her knee pain. She reports intermittent bilateral knee pain, this is tolerable at this time. She reports increased low back pain. Her pain is constant and aching in nature. Her leg pain is much improved. Her pain is worse with moving from sitting to standing. She does endorse morning stiffness. She continues to participate in physical therapy and a home exercise routine. She continues to take Percocet as needed with benefit and without adverse effects. She denies any other health changes.     Interval History 11/22/2022:  The patient returns to clinic today for follow up of low back and knee pain. She continues to report low back pain that radiates into the posterior aspect of both legs to under her feet. Her pain is worse with moving from sitting to standing. She also endorses morning stiffness. She recently started physical therapy. She has completed one session. She continues to report bilateral knee pain. She  endorses worsening pain with the cold weather. Her pain is worse with standing and walking. She continues to take Percocet as needed with benefit and without adverse effects. She denies any other health changes. Her pain today is 8/10.    Interval History 10/5/2022:  She returns for follow-up.  Her knees are doing well.  She has some discomfort but not enough to do procedures.  Her main complaint is lumbar spine pain for the past few weeks that is radiating to the dorsal aspect of both lower extremities.  She has no red flag signs/symptoms.  No new bowel or bladder symptomatology.  The pain radiates from the back down to the plantar aspect of both feet.  It is activity related.  Rest helps some but it does not resolve the pain.  She has not been in therapy for a while.  No recent imaging.  No recent weight changes.  Otherwise, no new symptomatology.  She goes regularly to her primary care physician for health maintenance.    Interval History 8/9/2022:  Alivia Thomas presents to the clinic for a follow-up appointment for low back and knee pain. She is s/p right genicular RFA on 5/9/2022 and left genicular RFA on 5/22/2022. She reports 60% relief of her knee pain. She also had panniculectomy on 6/14/2022. She is healing well. She continues to report intermittent low back pain, worse with prolonged activity. She denies any radicular leg pain. Her pain is worse prolonged standing and walking. She continues to perform a homoe exercise routine. She continues to take Percocet as needed with benefit and without adverse effects. She denies any other health changes. Her pain today is 7/10.    Interval History 4/9/2022:  The patient returns to clinic today for follow-up bilateral L3, 4, 5 RFA last month.  She reports that she is feeling very well following the procedure and reports 60-70% pain relief.  Today, she reports that her bilateral knee pain has continued to worsen, and she would like to go ahead and repeat  bilateral genicular RFA as soon as possible.  She denies any new numbness, tingling, weakness, saddle anesthesia or bowel/bladder incontinence.    Interval History 12/28/2021:  The patient returns to clinic today for follow up via virtual visit. She continues to report bilateral knee pain. This pain is worse with prolonged standing and walking. She continues to report low back and buttock pain. She denies any radicular leg pain. She continues to report a home exercise routine. She continues to report benefit with Percocet. She denies any adverse effects. She denies any other health changes.    Pain Disability Index Review:      12/18/2024     9:59 AM 5/29/2024     3:08 PM 10/23/2023     3:37 PM   Last 3 PDI Scores   Pain Disability Index (PDI) [phone]       Pain Medications:  Percocet 10/325 mg TID PRN pain    Opioid Contract: yes     report:  Reviewed and consistent with medication use as prescribed.    Pain Procedures:   steroid inj and visco-supplementation (series of 3) in left knee- not helpful  3/31/14 Bilateral genicular nerve blocks  2/16/16 Bilateral L2,3,4,5 MBB  3/1/16 Bilateral L2,3,4,5 MBB   4/6/16 Left L2,3,4,5 RFA- significant relief  4/20/16 Right L2,3,4,5 RFA- significant relief  10/5/16 Left L2,3,4,5 cooled RFA  10/19/16 Right L2,3,4,5 cooled RFA  4/26/17 Left L2,3,4,5 cooled RFA  5/9/17 Right L2,3,4,5 cooled RFA  12/26/2017- Left L2,3,4,5 cooled RFA  1/9/2018- Right L2,3,4,5 cooled RFA  6/26/18 Left L2,3,4,5 cooled RFA- 60% relief  7/10/18 Right L2,3,4,5 cooled RFA- 60% relief  9/28/18 TPIs- significant relief  12/27/18 Left L2,3,4,5 RFA- 70% relief  1/29/19 Right L2,3,4,5 RFA- 70% relief  6/18/19 Left L2,3,4,5 RFA- 70% relief  7/9/19 Right L2,3,4,5 RFA- 50% relief  12/19/19 Left L2,3,4,5 RFA- 80% relief  12/31/19 Right L2,3,4,5 RFA- 50% relief  3/2/2020- Right genicular nerve block- 70% relief for one month  3/12/20 Left subacromial bursa injection- 90% relief  5/18/2020- Left genicular nerve  block- 70%  6/9/2020- Bilateral SI joint injections- 50% relief  10/13/2020- Right genicular RFA- 50% relief   11/3/2020- Left genicular RFA- 50% relief  12/15/2020- Left L2,3,4,5 RFA  12/29/2020- Right L2,3,4,5 RFA  4/26/2021- Left subacromial bursa'  5/11/2021- Bilateral SI joint injections   6/28/2021- Left genicular RFA  7/13/2021- Right genicular RFA  8/24/2021- Right L2,3,4,5 RFA  9/11/2021- Left L2,3,4,5 RFA  3/7/2022- Right L2,3,4,5 RFA  3/21/2022- Left L2,3,4,5 RFA  5/9/2022- Right genicular RFA  5/23/2022- Left genicular RFA  12/12/2022- Right genicular RFA  1/9/2023- Left genicular RFA  3/6/2023- Right L3,4,5 RFA  7/24/23 L5/S1 IL KERI- 50% relief  11/27/2023- Right L3,4,5 RFA  1/22/2024 - Right Genicular - 60% relief  2/12/2024 - Left L3, L4, L5 RFA - 60% relief   5/16/2024 - Bilateral SIJ and GTB - 70% relief  1/13/25 L5/S1 IL KERI- 60% relief    Physical Therapy/Home Exercise: yes    Imaging:   MRI Lumbar Spine 7/12/2023:  COMPARISON:  10/6/22.     FINDINGS:  Alignment: Grade 1 anterolisthesis at L3-L4 and L4-L5.  No evidence for spondylolysis.     Vertebrae: Degenerative endplate changes throughout the lower lumbar spine.  Hemangioma noted within the L5 vertebral body.  No fracture or marrow infiltrative process.     Discs: Moderate disc height loss at L3-S1.  No evidence for discitis.     Cord: Conus terminates at T12-L1 and appears unremarkable.  Cauda equina appears unremarkable.     Degenerative findings:     T12-L1: No spinal canal stenosis or neuroforaminal narrowing.     L1-L2: Mild facet arthropathy results in mild right neural foraminal narrowing.     L2-L3: Circumferential disc bulge and mild facet arthropathy result in mild effacement of the thecal sac and mild bilateral neural foraminal narrowing.     L3-L4: Uncovering of the intervertebral disc with bulge and severe facet arthropathy result in severe spinal canal stenosis and severe left, moderate right neural foraminal narrowing.      L4-L5: Uncovering of the intervertebral disc with bulge and severe facet arthropathy result in severe spinal canal stenosis and severe left, moderate right neural foraminal narrowing.     L5-S1: Circumferential disc bulge and mild facet arthropathy.  No spinal canal stenosis or neural foraminal narrowing.     Paraspinal muscles & soft tissues: Mild paraspinal muscle atrophy.  Partially visualized markedly enlarged leiomyomatous uterus noted.  There is asymmetric enlargement of the right kidney.     Impression:     1. Multilevel advanced degenerative change of the lumbar spine.  Severe spinal canal stenosis and moderate to severe neural foraminal narrowing noted at L3-L5.  2. Partially visualized markedly enlarged leiomyomatous uterus.  Recommend further evaluation pelvic ultrasound.  3. Asymmetric enlargement of the right kidney.  Recommend further evaluation with retroperitoneal ultrasound.  This report was flagged in Epic as abnormal.    Xray Knee 3/6/2019:  FINDINGS:  Postop bilateral knee replacements.  The the the the irregularity about the left patella with soft tissue calcifications similar.  Small joint effusion.  Some irregularity of the right patella unchanged as well.     IMPRESSION:      Postop bilateral knee replacement with irregularity about the patella as above.    MRI Cervical Spine 4/24/2018:  FINDINGS:  There is reversal of the normal cervical lordosis which could be related to patient positioning versus muscle spasm.     Cervical vertebral body heights are well maintained without evidence of acute fracture or dislocation.  Marrow signal is unremarkable.     Spinal canal contents are unremarkable.  No abnormal masses or collections.     Individual levels as detailed below:     C2-3: No significant spinal canal stenosis or neuroforaminal narrowing.     C3-4: Facet arthropathy.  No significant spinal canal stenosis or neuroforaminal narrowing.     C4-5: Facet arthropathy.  Small broad-based disc  osteophyte complex.  Mild right neuroforaminal narrowing.  Mild spinal canal stenosis.     C5-6: Facet arthropathy.  Uncovertebral joint spurring.  Broad-based posterior disc osteophyte complex.  Mild right neuroforaminal narrowing.  Mild spinal canal stenosis.     C6-7: Facet arthropathy.  No significant spinal canal stenosis or neuroforaminal narrowing.     C7-T1: No significant spinal canal stenosis or neuroforaminal narrowing.     Paraspinal muscles are unremarkable.  Soft tissues are intact.     IMPRESSION:      Mild multilevel cervical spondylosis with mild spinal canal stenosis and mild right neuroforaminal narrowing at C4-5 and C5-6.    Xray Lumbar Spine 9/21/2015:  Results: AP, lateral neutral, lateral flexion , lateral extension, bilateral oblique and spot views.  The alignment of the lumbar spine demonstrates a mild levoscoliosis .  11-mm anterior listhesis of L4 relative to L5 with no translational abnormalities   seen on flexion and extension views.  The vertebral body heights  are well-maintained  , mild disk space narrowing L4-L5 and L5-S1.   Mild anterior and marginal osteophyte formation seen  throughout the lumbar spine  .  The oblique views demonstrate no   definite spondylolysis.  There is facet joint osseous hypertrophy noted at L3-L4 and L4-L5.  IMPRESSION:         The Significant spondylosis of the lumbar spine with grade 1 anterior listhesis L4 relative to L5.  Facet joint osseous hypertrophy L3-L4 and L4-5.    Allergies:   Review of patient's allergies indicates:   Allergen Reactions    Strawberry Anaphylaxis       Current Medications:   Current Outpatient Medications   Medication Sig Dispense Refill    acetaminophen (TYLENOL) 650 MG TbSR Take 1 tablet (650 mg total) by mouth every 8 (eight) hours as needed (pain). 90 tablet 1    albuterol (PROVENTIL/VENTOLIN HFA) 90 mcg/actuation inhaler INHALE TWO PUFFS INTO LUNGS EVERY 4 TO 6 HOURS AS NEEDED FOR SHORTNESS OF BREATH AND FOR WHEEZING 54 g 3     allopurinoL (ZYLOPRIM) 300 MG tablet TAKE 1 TABLET (300 MG TOTAL) BY MOUTH 2 (TWO) TIMES DAILY. 180 tablet 1    apixaban (ELIQUIS) 5 mg Tab Take 1 tablet (5 mg total) by mouth 2 (two) times daily. 60 tablet 6    atorvastatin (LIPITOR) 80 MG tablet Take 1 tablet (80 mg total) by mouth once daily. 90 tablet 1    azithromycin (Z-MICKI) 250 MG tablet follow package directions 6 tablet 0    b complex vitamins tablet Take 1 tablet by mouth every other day.       blood pressure monitor (BLOOD PRESSURE KIT) Kit 1 kit by Misc.(Non-Drug; Combo Route) route Daily. Check blood pressure daily 1 each 0    calcium citrate/vitamin D2 (ISIAH-CITRATE ORAL) Take 500 mg by mouth once daily.      colchicine (MITIGARE) 0.6 mg Cap One daily as needed for gout flare 90 capsule 1    cyanocobalamin (VITAMIN B-12) 1000 MCG tablet Take 100 mcg by mouth every morning.      FLUoxetine 40 MG capsule Take 1 capsule (40 mg total) by mouth once daily. 90 capsule 3    fluticasone propionate (FLONASE) 50 mcg/actuation nasal spray 1 SPRAY BY EACH NOSTRIL ROUTE 2 TIMES DAILY AS NEEDED FOR RHINITIS. 48 g 3    LIDOcaine (XYLOCAINE) 5 % Oint ointment APPLY TOPICALLY AS NEEDED. 35.44 g 6    LIDOcaine-prilocaine (EMLA) cream APPLY TOPICALLY AS NEEDED. FOR FOOT PAIN 30 g 2    MULTIVIT-IRON-MIN-FOLIC ACID 3,500-18-0.4 UNIT-MG-MG ORAL CHEW Take 1 tablet by mouth 2 (two) times daily.       naloxone (NARCAN) 4 mg/actuation Spry 4mg by nasal route as needed for opioid overdose; may repeat every 2-3 minutes in alternating nostrils until medical help arrives. Call 911 1 each 11    nystatin (MYCOSTATIN) powder Apply topically 2 (two) times daily. 60 g 3    omeprazole (PRILOSEC) 40 MG capsule Take 1 capsule (40 mg total) by mouth once daily. 90 capsule 3    oxybutynin (DITROPAN-XL) 5 MG TR24 Take 1 tablet (5 mg total) by mouth once daily. 90 tablet 3    oxyCODONE-acetaminophen (PERCOCET)  mg per tablet Take 1 tablet by mouth every 6 to 8 hours as needed for Pain  (for severe pain). 105 tablet 0    prednisoLONE acetate (PRED FORTE) 1 % DrpS Place 1 drop into the right eye 3 (three) times daily.      semaglutide (OZEMPIC) 2 mg/dose (8 mg/3 mL) PnIj Inject 2 mg into the skin every 7 days. 3 mL 11    senna-docusate 8.6-50 mg (SENNA WITH DOCUSATE SODIUM) 8.6-50 mg per tablet Take 1 tablet by mouth once daily. 30 tablet 0    urea (CARMOL) 40 % Crea Apply topically once daily. To dry skin on the feet. 120 g 5    vitamin D 185 MG Tab Take 5,000 mg by mouth every morning.        No current facility-administered medications for this visit.     Facility-Administered Medications Ordered in Other Visits   Medication Dose Route Frequency Provider Last Rate Last Admin    0.9%  NaCl infusion   Intravenous Continuous Lina Wagner MD 25 mL/hr at 12/15/20 1506 New Bag at 04/14/25 1248    0.9%  NaCl infusion   Intravenous Continuous Ciro Chung MD        0.9%  NaCl infusion   Intravenous Continuous Santos Barron MD           REVIEW OF SYSTEMS:    GENERAL:  No weight loss, malaise or fevers.  HEENT:  Negative for frequent or significant headaches.  NECK:  Negative for lumps, goiter, pain and significant neck swelling.  RESPIRATORY:  Negative for cough, wheezing or shortness of breath.  CARDIOVASCULAR:  Negative for chest pain, leg swelling or palpitations. HTN  GI:  Negative for abdominal discomfort, blood in stools or black stools or change in bowel habits. GERD  MUSCULOSKELETAL:  See HPI.  SKIN:  Negative for lesions, rash, and itching.  PSYCH:  Negative for sleep disturbance, mood disorder and recent psychosocial stressors.  HEMATOLOGY/LYMPHOLOGY:  Negative for prolonged bleeding, bruising easily or swollen nodes.  NEURO:   No history of headaches, syncope, paralysis, seizures or tremors.  ENDO: Diabetes  All other reviewed and negative other than HPI.    Past Medical History:  Past Medical History:   Diagnosis Date    Acute right MCA stroke 01/04/2024    Allergy     Anemia      Asthma     Bilateral shoulder bursitis     Cervical stenosis of spine     Chronic pain     DDD (degenerative disc disease), cervical     Depression 01/28/2019    Diabetes mellitus type II     DJD (degenerative joint disease) of knee 06/19/2014    Facet arthritis of lumbar region 12/17/2015    Facet syndrome 12/17/2015    GERD (gastroesophageal reflux disease)     Heartburn     Hyperlipidemia     Hypertension     Morbid obesity     Neuromuscular disorder     PADDY (obstructive sleep apnea)     Paroxysmal atrial fibrillation 03/19/2024    Proteinuria     Right carpal tunnel syndrome     Sacroiliitis 06/13/2018    Sacroiliitis     Sleep apnea     Uterine fibroid        Past Surgical History:  Past Surgical History:   Procedure Laterality Date    CARPAL TUNNEL RELEASE      CARPAL TUNNEL RELEASE  1980s    left    CARPAL TUNNEL RELEASE  2012    right    CATARACT EXTRACTION W/  INTRAOCULAR LENS IMPLANT Left 08/08/2023    DR. STOKES    CATARACT EXTRACTION W/  INTRAOCULAR LENS IMPLANT Right 08/29/2023        COLONOSCOPY N/A 06/15/2020    Procedure: COLONOSCOPY;  Surgeon: Jc Koo MD;  [facility]  Service: Endoscopy;  Laterality: N/A;  COVID screening on 6/13/20 at [facility]  pt updated on drop off location and no visitor policy-rb    COLONOSCOPY, SCREENING, LOW RISK PATIENT N/A 4/14/2025    Procedure: COLONOSCOPY, SCREENING, LOW RISK PATIENT;  Surgeon: Charlotte Cooney MD;  [facility]  Service: Endoscopy;  Laterality: N/A;  ref Dr drake peg  eliquis ozempic  portal tmb  okay to hold eliquis 2 days for EGD/colonoscopy per Dr Morton-tmb  4/4 precall complete. reviewed prep instructions. will resend through portal. will hold ozempic/eliquis. kw    EPIDURAL STEROID INJECTION N/A 07/24/2023    Procedure: INJECTION, STEROID, EPIDURAL, L5/S1 SOONER DATE;  Surgeon: Israel Hurtado MD;  Location: [facility] PAIN MGT;  Service: Pain Management;  Laterality: N/A;     ESOPHAGOGASTRODUODENOSCOPY N/A 03/27/2019    Procedure: EGD (ESOPHAGOGASTRODUODENOSCOPY);  Surgeon: Robbin Bar MD;  Location: [facility] ENDO (2ND FLR);  Service: Endoscopy;  Laterality: N/A;  BMI 61.3/2nd floor case/svn    ESOPHAGOGASTRODUODENOSCOPY N/A 4/14/2025    Procedure: EGD (ESOPHAGOGASTRODUODENOSCOPY);  Surgeon: Charlotte Cooney MD;  Location: [facility] ENDO (4TH FLR);  Service: Endoscopy;  Laterality: N/A;    INJECTION OF JOINT Bilateral 06/09/2020    Procedure: INJECTION, JOINT, BILATERAL SI;  Surgeon: Lina Wagner MD;  Location: [facility] PAIN MGT;  Service: Pain Management;  Laterality: Bilateral;  B/L SI Joint Injection    INJECTION OF JOINT Bilateral 05/11/2021    Procedure: INJECTION, JOINT, SACROILIAC (SI) need consent;  Surgeon: Lina Wagner MD;  Location: [facility] PAIN MGT;  Service: Pain Management;  Laterality: Bilateral;    INJECTION OF JOINT Bilateral 5/16/2024    Procedure: INJECTION, JOINT BILATERAL SI AND GTB;  Surgeon: Israel Hurtado MD;  Location: [facility] PAIN MGT;  Service: Pain Management;  Laterality: Bilateral;  [phone]  2 WK F/U BENJI    JOINT REPLACEMENT Bilateral     with 2 revisions on rt    KNEE SURGERY  03/2010    orthroscope    KNEE SURGERY  06/19/2014    left TKR    LAPAROSCOPIC SLEEVE GASTRECTOMY N/A 08/28/2019    Procedure: GASTRECTOMY, SLEEVE, LAPAROSCOPIC with intraop EGD;  Surgeon: Heriberto Clements MD;  Location: [facility] OR 2ND FLR;  Service: General;  Laterality: N/A;    PANNICULECTOMY N/A 06/14/2022    Procedure: PANNICULECTOMY;  Surgeon: Rhett Gray MD;  Location: [facility] OR 2ND FLR;  Service: Plastics;  Laterality: N/A;    RADIOFREQUENCY ABLATION Right 07/09/2019    Procedure: RADIOFREQUENCY ABLATION;  Surgeon: Lina Wagner MD;  Location: [facility] PAIN MGT;  Service: Pain Management;  Laterality: Right;  RIGHT RFA L2,3,4,5  2 of 2    RADIOFREQUENCY ABLATION Left 12/19/2019    Procedure: RADIOFREQUENCY ABLATION, LEFT L2-L3-L4-L5 MEDIAL BRANCH 1 OF 2;   Surgeon: Lina Wagner MD;  Location: [facility] [place] PAIN MGT;  Service: Pain Management;  Laterality: Left;    RADIOFREQUENCY ABLATION Right 12/31/2019    Procedure: RADIOFREQUENCY ABLATION, RIGHT L2-L3-L4-L5  2 OF 2;  Surgeon: Lina Wagner MD;  Location: [facility] [place] PAIN MGT;  Service: Pain Management;  Laterality: Right;    RADIOFREQUENCY ABLATION Right 10/13/2020    Procedure: RADIOFREQUENCY ABLATION, Genicular Cooled 1 of 2;  Surgeon: Lina Wagner MD;  Location: [facility] [place] PAIN MGT;  Service: Pain Management;  Laterality: Right;    RADIOFREQUENCY ABLATION Left 11/03/2020    Procedure: RADIOFREQUENCY ABLATION, GENICULAR COOLED  2 OF 2;  Surgeon: Lina Wagner MD;  Location: [facility] [place] PAIN MGT;  Service: Pain Management;  Laterality: Left;    RADIOFREQUENCY ABLATION Left 12/15/2020    Procedure: RADIOFREQUENCY ABLATION LEFT L2,3,4,5 1 of 2;  Surgeon: Lina Wagner MD;  Location: [facility] [place] PAIN MGT;  Service: Pain Management;  Laterality: Left;    RADIOFREQUENCY ABLATION Right 12/29/2020    Procedure: RADIOFREQUENCY ABLATION RIGHT L2,3,4,5 2 of 2;  Surgeon: Lina Wagner MD;  Location: [facility] [place] PAIN MGT;  Service: Pain Management;  Laterality: Right;    RADIOFREQUENCY ABLATION Left 06/29/2021    Procedure: RADIOFREQUENCY ABLATION GENICULAR COOLED;  Surgeon: Lina Wagner MD;  Location: [facility] [place] PAIN MGT;  Service: Pain Management;  Laterality: Left;  1 of 2    RADIOFREQUENCY ABLATION Right 07/13/2021    Procedure: RADIOFREQUENCY ABLATION GENICULAR;  Surgeon: Lina Wagner MD;  Location: [facility] [place] PAIN MGT;  Service: Pain Management;  Laterality: Right;  2 of 2    RADIOFREQUENCY ABLATION Right 08/24/2021    Procedure: RADIOFREQUENCY ABLATION, L2-L3-L4-L5 MEDIAL BRANCH 1 OF 2;  Surgeon: Lina Wagner MD;  Location: [facility] [place] PAIN MGT;  Service: Pain Management;  Laterality: Right;    RADIOFREQUENCY ABLATION Left 09/11/2021    Procedure: RADIOFREQUENCY ABLATION, L2-L3-L4-L5 MEDIAL BR ANCH 2 OF 2;  Surgeon: Lina QUICK  MD Kristy;  Location: [facility] PAIN MGT;  Service: Pain Management;  Laterality: Left;    RADIOFREQUENCY ABLATION Right 03/07/2022    Procedure: RADIOFREQUENCY ABLATION RIGHT L3,4,5 1 of 2, needs consent;  Surgeon: Israel Hurtado MD;  Location: [facility] PAIN MGT;  Service: Pain Management;  Laterality: Right;    RADIOFREQUENCY ABLATION Left 03/21/2022    Procedure: RADIOFREQUENCY ABLATION RIGHT L3,4,5 2 of 2, needs consent;  Surgeon: Israel Hurtado MD;  Location: [facility] PAIN MGT;  Service: Pain Management;  Laterality: Left;    RADIOFREQUENCY ABLATION Right 05/09/2022    Procedure: RADIOFREQUENCY ABLATION RIGHT GENICULAR COOLED 1 of 2;  Surgeon: Israel Hurtado MD;  Location: [facility] PAIN MGT;  Service: Pain Management;  Laterality: Right;    RADIOFREQUENCY ABLATION Left 05/23/2022    Procedure: RADIOFREQUENCY ABLATION LEFT GENICULAR COOLED  2 of 2;  Surgeon: Israel Hurtado MD;  Location: [facility] PAIN MGT;  Service: Pain Management;  Laterality: Left;    RADIOFREQUENCY ABLATION Right 12/12/2022    Procedure: RADIOFREQUENCY ABLATION RIGHT GENICULAR COOLED  ONE OF TWO  NEEDS CONSENT;  Surgeon: Israel Hurtado MD;  Location: [facility] PAIN MGT;  Service: Pain Management;  Laterality: Right;    RADIOFREQUENCY ABLATION Left 01/09/2023    Procedure: RADIOFREQUENCY ABLATION LEFT GENICULAR COOLED  TWO OF TWO NEEDS CONSENT;  Surgeon: Israel Hurtado MD;  Location: [facility] PAIN MGT;  Service: Pain Management;  Laterality: Left;    RADIOFREQUENCY ABLATION Right 03/06/2023    Procedure: RADIOFREQUENCY ABLATION RIGHT L3,L4,L5;  Surgeon: Israel Hurtado MD;  Location: [facility] PAIN MGT;  Service: Pain Management;  Laterality: Right;    RADIOFREQUENCY ABLATION Left 05/22/2023    Procedure: RADIOFREQUENCY ABLATION LEFT L3,L4,L5;  Surgeon: Israel Hurtado MD;  Location: [facility] PAIN MGT;  Service: Pain Management;  Laterality: Left;  4/3 LM TO R/S    RADIOFREQUENCY ABLATION Right 11/27/2023    Procedure: RADIOFREQUENCY ABLATION RIGHT L3, 4, 5 1 OF 3;  Surgeon: Israel Hurtado MD;   Location: [facility] PAIN MGT;  Service: Pain Management;  Laterality: Right;    RADIOFREQUENCY ABLATION Right 01/22/2024    Procedure: RADIOFREQUENCY ABLATION RIGHT GENICULAR 3 NERVES 3 OF 3;  Surgeon: Israel Hurtado MD;  Location: [facility] PAIN MGT;  Service: Pain Management;  Laterality: Right;    RADIOFREQUENCY ABLATION Left 02/12/2024    Procedure: RADIOFREQUENCY ABLATION LEFT L3, 4, 5;  Surgeon: Israel Hurtado MD;  Location: [facility] PAIN MGT;  Service: Pain Management;  Laterality: Left;  [phone]    RADIOFREQUENCY ABLATION OF LUMBAR MEDIAL BRANCH NERVE AT SINGLE LEVEL Left 06/26/2018    Procedure: RADIOFREQUENCY ABLATION, NERVE, MEDIAL BRANCH, LUMBAR, 1 LEVEL;  Surgeon: Lina Wagner MD;  Location: [facility] PAIN MGT;  Service: Pain Management;  Laterality: Left;  Left Cooled RFA @ L2,3,4,5  [phone]  with Sedation    1 of 2    RADIOFREQUENCY ABLATION OF LUMBAR MEDIAL BRANCH NERVE AT SINGLE LEVEL Right 07/10/2018    Procedure: RADIOFREQUENCY ABLATION, NERVE, MEDIAL BRANCH, LUMBAR, 1 LEVEL;  Surgeon: Lina Wagner MD;  Location: [facility] PAIN MGT;  Service: Pain Management;  Laterality: Right;  RIght Cooled RFA @ L2,3,4,5  [phone] with Sedation    2 of 2    REVISION OF KNEE ARTHROPLASTY Right 7/18/2024    Procedure: REVISION, ARTHROPLASTY, KNEE: RIGHT;  Surgeon: Theodore Swan MD;  Location: [address];  Service: Orthopedics;  Laterality: Right;    TRANSFORAMINAL EPIDURAL INJECTION OF STEROID N/A 1/13/2025    Procedure: LUMBAR L5/S1 IL KERI *ELIQUIS CLEARANCE IN CHART*;  Surgeon: Israel Hurtado MD;  Location: [facility] PAIN MGT;  Service: Pain Management;  Laterality: N/A;  2 WK F/U SUNDEEP    TRIGGER FINGER RELEASE Right 2017    TRIGGER FINGER RELEASE Left 07/29/2019    Procedure: RELEASE, TRIGGER FINGER, Left Thumb;  Surgeon: Velma Garcia MD;  Location: [facility] OR;  Service: Orthopedics;  Laterality: Left;  Local w/ MAC       Family History:  Family History   Problem Relation Name Age of Onset    Diabetes  Mother      Cataracts Mother      Diabetes Father      Cataracts Father      Hypertension Sister      Diabetes Sister      No Known Problems Sister      Cancer Sister          lymphoma     Coronary artery disease Brother      Diabetes Brother      Diabetes Brother      Hypotension Brother      Kidney failure Brother      Hypotension Brother      Amblyopia Neg Hx      Blindness Neg Hx      Glaucoma Neg Hx      Macular degeneration Neg Hx      Retinal detachment Neg Hx      Strabismus Neg Hx      Stroke Neg Hx      Thyroid disease Neg Hx      Anesthesia problems Neg Hx         Social History:  Social History     Socioeconomic History    Marital status:    Tobacco Use    Smoking status: Never     Passive exposure: Never    Smokeless tobacco: Never   Substance and Sexual Activity    Alcohol use: Not Currently    Drug use: No    Sexual activity: Yes     Partners: Female     Birth control/protection: None   Social History Narrative    Disabled. The patient is the youngest of 6 siblings.  Lives with single-sex partner.                  Social Drivers of Health     Financial Resource Strain: Low Risk  (2/17/2025)    Overall Financial Resource Strain (CARDIA)     Difficulty of Paying Living Expenses: Not very hard   Food Insecurity: No Food Insecurity (2/17/2025)    Hunger Vital Sign     Worried About Running Out of Food in the Last Year: Never true     Ran Out of Food in the Last Year: Never true   Transportation Needs: No Transportation Needs (2/17/2025)    PRAPARE - Transportation     Lack of Transportation (Medical): No     Lack of Transportation (Non-Medical): No   Physical Activity: Inactive (2/17/2025)    Exercise Vital Sign     Days of Exercise per Week: 0 days     Minutes of Exercise per Session: 10 min   Stress: No Stress Concern Present (2/17/2025)    [employer] - Occupational Stress Questionnaire     Feeling of Stress : Not at all   Housing Stability: Low Risk  (2/17/2025)     Housing Stability Vital Sign     Unable to Pay for Housing in the Last Year: No     Number of Times Moved in the Last Year: 0     Homeless in the Last Year: No       OBJECTIVE:    PHYSICAL EXAMINATION:    General appearance: Well appearing, in no acute distress, alert and oriented x3.  Psych:  Mood and affect appropriate.  Skin: Skin color normal, no rashes or lesions, in both upper and lower body.  Extremities: Moves all visualized extremities freely.    PREVIOUS PHYSICAL EXAMINATION:    GENERAL: Well appearing, in no acute distress, alert and oriented x3.   PSYCH:  Mood and affect appropriate.   SKIN: Skin color, texture, turgor normal, no rashes or lesions. Well-healing right knee scar.   HEAD/FACE:  Normocephalic, atraumatic.   CV: Rate regular.  PULM: No evidence of respiratory difficulty, symmetric chest rise.  BACK: Negative SLR bilaterally, but positive to posterior thigh tightness and pain. There is pain with palpation over midline lumbar spine. Limited ROM with pain on flexion > extension. Mild tenderness over bilateral SIJ. Positive bilateral facet loading. +Millgrams bilaterally. Negative FABERE. Negative FADIR.  EXTREMITIES: No edema.  Mild TTP over bilateral GTB.   MUSCULOSKELETAL: 4/5 strength in right ankle with plantar and dorsiflexion. 4/5 strength in left ankle with plantar and dorsiflexion. 5/5 strength with right knee flexion and extension. 5/5 strength with left knee flexion and extension. 5/5 strength in right EHL, 5/5 strength in left EHL.  NEURO:  Plantar response are downgoing. No clonus. Normal sensation.   GAIT: Antalgic- ambulates without assistance.    ASSESSMENT: 58 y.o. year old female with low back and knee pain, consistent with the followin. Chronic pain syndrome        2. Primary osteoarthritis of both knees        3. Spondylosis of lumbar region without myelopathy or radiculopathy        4. Degeneration of intervertebral disc of lumbar region with discogenic back pain         5. Lumbar spondylosis        6. Sacroiliitis        7. Encounter for monitoring opioid maintenance therapy              PLAN:   We discussed with the patient the assessment and recommendations. The following is the plan we agreed on:     - Previous imaging reviewed.   - Currently on Percocet 10/325 mg QID PRN #105. Will decrease patient to three times a day #90 for next month.  - The patient is here today for a refill of current pain medications and they believe these provide effective pain control and improvements in quality of life.  The patient notes no serious side effects, and feels the benefits outweigh the risks.  The patient was reminded of the pain contract that they signed previously as well as the risks and benefits of the medication including possible death.  The updated [place] [employer] prescription monitoring program was reviewed, and the patient has been found to be compliant with current treatment plan.    - UDS 12/18/2024 reviewed and appropriate.   - Continue HEP and PT. Continue aquatic therapy.   - Can repeat lumbar KERI if needed.  - RTC in 3 months or sooner if needed.      The above plan and management options were discussed at length with patient. Patient is in agreement with the above and verbalized understanding.     Virginia Sanchez, EMILY  04/28/2025